# Patient Record
Sex: FEMALE | Race: WHITE | Employment: OTHER | URBAN - METROPOLITAN AREA
[De-identification: names, ages, dates, MRNs, and addresses within clinical notes are randomized per-mention and may not be internally consistent; named-entity substitution may affect disease eponyms.]

---

## 2017-01-01 ENCOUNTER — EXTERNAL ORDER RESULTS (OUTPATIENT)
Dept: HEALTH INFORMATION MANAGEMENT | Facility: CLINIC | Age: 42
End: 2017-01-01

## 2017-01-01 LAB — PAP SMEAR - HIM PATIENT REPORTED: NEGATIVE

## 2017-01-06 ENCOUNTER — HOSPITAL ENCOUNTER (EMERGENCY)
Facility: CLINIC | Age: 42
Discharge: HOME | End: 2017-01-06
Attending: EMERGENCY MEDICINE | Admitting: EMERGENCY MEDICINE
Payer: COMMERCIAL

## 2017-01-06 VITALS
DIASTOLIC BLOOD PRESSURE: 87 MMHG | TEMPERATURE: 98 F | OXYGEN SATURATION: 100 % | RESPIRATION RATE: 16 BRPM | SYSTOLIC BLOOD PRESSURE: 135 MMHG

## 2017-01-06 DIAGNOSIS — M25.571 CHRONIC PAIN OF RIGHT ANKLE: ICD-10-CM

## 2017-01-06 DIAGNOSIS — G89.29 CHRONIC PAIN OF RIGHT ANKLE: ICD-10-CM

## 2017-01-06 PROCEDURE — 99284 EMERGENCY DEPT VISIT MOD MDM: CPT | Performed by: EMERGENCY MEDICINE

## 2017-01-06 PROCEDURE — 99282 EMERGENCY DEPT VISIT SF MDM: CPT

## 2017-01-06 RX ORDER — OXYCODONE AND ACETAMINOPHEN 5; 325 MG/1; MG/1
1 TABLET ORAL EVERY 6 HOURS PRN
Qty: 7 TABLET | Refills: 0 | Status: SHIPPED | OUTPATIENT
Start: 2017-01-06 | End: 2017-02-02

## 2017-01-06 ASSESSMENT — ENCOUNTER SYMPTOMS
CONSTITUTIONAL NEGATIVE: 1
PSYCHIATRIC NEGATIVE: 1
CARDIOVASCULAR NEGATIVE: 1
GASTROINTESTINAL NEGATIVE: 1
NEUROLOGICAL NEGATIVE: 1
EYES NEGATIVE: 1
RESPIRATORY NEGATIVE: 1
ENDOCRINE NEGATIVE: 1
HEMATOLOGIC/LYMPHATIC NEGATIVE: 1
ALLERGIC/IMMUNOLOGIC NEGATIVE: 1

## 2017-01-06 NOTE — ED AVS SNAPSHOT
[facility] Emergency Department    [address] [address]    Phone:  [phone]    Fax:  [phone]                                       Maris Wagner   MRN: [id number]    Department:  [facility] Emergency Department   Date of Visit:  1/6/2017           Patient Information     Date Of Birth          1975        Your diagnoses for this visit were:     Chronic pain of right ankle        You were seen by Gabriele Hunter MD.      Follow-up Information     Call Clint Simon DPM.    Specialty:  Podiatry    Why:  To discuss options for care including casting, or imaging options, MRI vs other options for care    Contact information:    [facility]  [address] [place] [id number] [phone]        Discharge References/Attachments     CHRONIC PAIN (ENGLISH)    OXYCODONE HYDROCHLORIDE, ACETAMINOPHEN ORAL TABLET (ENGLISH)      24 Hour Appointment Hotline       To make an appointment at any [facility] clinic, call [phone] ([phone]). If you don't have a family doctor or clinic, we will help you find one. [facility] clinics are conveniently located to serve the needs of you and your family.             Review of your medicines      START taking        Dose / Directions Last dose taken    oxyCODONE-acetaminophen 5-325 MG per tablet   Commonly known as:  PERCOCET   Dose:  1 tablet   Quantity:  7 tablet        Take 1 tablet by mouth every 6 hours as needed for pain   Refills:  0          Our records show that you are taking the medicines listed below. If these are incorrect, please call your family doctor or clinic.        Dose / Directions Last dose taken    IBUPROFEN PO   Dose:  600 mg        Take 600 mg by mouth every 6 hours as needed for moderate pain   Refills:  0        OMEPRAZOLE PO   Dose:  40 mg        Take 40 mg by mouth every morning   Refills:  0                Prescriptions were sent or printed at these locations (1 Prescription)                    [facility] [place], [place] - [address]   [address] [address]    Telephone:  [phone]   Fax:  [phone]   Hours:                  Printed at Department/Unit printer (1 of 1)         oxyCODONE-acetaminophen (PERCOCET) 5-325 MG per tablet                Orders Needing Specimen Collection     None      Pending Results     No orders found from 1/5/2017 to 1/7/2017.            Pending Culture Results     No orders found from 1/5/2017 to 1/7/2017.             Test Results from your hospital stay            Thank you for choosing [facility]       Thank you for choosing [facility] for your care. Our goal is always to provide you with excellent care. Hearing back from our patients is one way we can continue to improve our services. Please take a few minutes to complete the written survey that you may receive in the mail after you visit with us. Thank you!        [employer] Information     [employer] gives you secure access to your electronic health record. If you see a primary care provider, you can also send messages to your care team and make appointments. If you have questions, please call your primary care clinic.  If you do not have a primary care provider, please call [phone] and they will assist you.        Care EveryWhere ID     This is your Care EveryWhere ID. This could be used by other organizations to access your [facility] medical records  [id number]        After Visit Summary       This is your record. Keep this with you and show to your community pharmacist(s) and doctor(s) at your next visit.

## 2017-01-06 NOTE — ED NOTES
"Broke right ankle on 12/10 and has been \"rolling\" it while in boot and feels like it's \"out of place\" again. Also has pain in left ankle  "

## 2017-01-06 NOTE — ED AVS SNAPSHOT
[facility] Emergency Department    [address] [address]    Phone:  [phone]    Fax:  [phone]                                       Maris Wagner   MRN: [id number]    Department:  [facility] Emergency Department   Date of Visit:  1/6/2017           After Visit Summary Signature Page     I have received my discharge instructions, and my questions have been answered. I have discussed any challenges I see with this plan with the nurse or doctor.    ..........................................................................................................................................  Patient/Patient Representative Signature      ..........................................................................................................................................  Patient Representative Print Name and Relationship to Patient    ..................................................               ................................................  Date                                            Time    ..........................................................................................................................................  Reviewed by Signature/Title    ...................................................              ..............................................  Date                                                            Time

## 2017-01-06 NOTE — ED NOTES
"Patient said, \"I have left ankle pain that started today, I don't know what I did to that leg.  I also have my right ankle I broke 12/10/16 and it feels like a rolled it all over again.  It pops almost like its not holding in place.  I felt like it was popping last time in a air cast and then they gave me this boot\"  "

## 2017-01-07 NOTE — ED PROVIDER NOTES
History     Chief Complaint   Patient presents with     Ankle Pain     HPI  Ms. Maris Wagner is a 41 year old female with history of spinal stenosis in lumbar region w/o neurogenic claudication who presents to the ED today for evaluation of ankle pain. The patient currently has a broken right ankle diagnosed via XR imaging after rolling ankle in [facility]) while on vacation 12/10/16. Her ankle was initally placed in boot. Approximately one week later, the patient reports being seen at [facility] by Dr. Izaguirre. She notes boot was removed and ankle was casted due to painful, shifting movement in the joint. Around 12/25, the patient reports cast got wet, was removed, and ankle was re-placed in boot for functional purposes. She presents to the ED today after a return in painful, shifting movements in the joint and failure to reach Dr. Izaguirre at [facility].  She asks if her ankle could be re-casted and for acute pain management. Also, she mentions unusual swelling in her left ankle this morning. However, she attributes this to long standing chronic back pain and neuropathy. Of note, she blames chronic back pain for her broken ankle stating it caused gait change which led to repeatedly rolling her ankle. She currently recieves Oxycodone from Dr. Madrid her primary care physician but states she has run out. She reports she is scheduled for spinal fusion in the next few weeks.     I have reviewed the Medications, Allergies, Past Medical and Surgical History, and Social History in the Epic system.    Social History: Presents via private car driven by son from [place], [place].    Past Medical History:  Spinal stenosis of lumbar region, anemia, gastroesophageal reflux disease, irritable bowel disease, seasonal allergies, obesity, postoperative ileus.    Medications:  Current Outpatient Prescriptions   Medication Sig Dispense Refill     oxyCODONE-acetaminophen (PERCOCET) 5-325 MG per tablet Take 1 tablet by  mouth every 6 hours as needed for pain 7 tablet 0     IBUPROFEN PO Take 600 mg by mouth every 6 hours as needed for moderate pain       OMEPRAZOLE PO Take 40 mg by mouth every morning         Allergies:  No Known Allergies    Review of Systems   Constitutional: Negative.    HENT: Negative.    Eyes: Negative.    Respiratory: Negative.    Cardiovascular: Negative.    Gastrointestinal: Negative.    Endocrine: Negative.    Genitourinary: Negative.    Musculoskeletal:        Ankle pain and discomfort   Skin: Negative.    Allergic/Immunologic: Negative.    Neurological: Negative.    Hematological: Negative.    Psychiatric/Behavioral: Negative.      Physical Exam   BP: 135/87 mmHg  Heart Rate: 82  Temp: 98  F (36.7  C)  Resp: 16  SpO2: 100 %  Physical Exam   Constitutional: She is oriented to person, place, and time. She appears well-developed and well-nourished. No distress.   HENT:   Head: Normocephalic and atraumatic.   Eyes: Conjunctivae and EOM are normal. Pupils are equal, round, and reactive to light.   Neck: Normal range of motion. Neck supple. No JVD present. No tracheal deviation present. No thyromegaly present.   Cardiovascular: Normal rate and regular rhythm.  Exam reveals no gallop and no friction rub.    No murmur heard.  Pulmonary/Chest: Effort normal and breath sounds normal. No stridor. No respiratory distress. She has no wheezes. She has no rales. She exhibits no tenderness.   Abdominal: Soft. Bowel sounds are normal. She exhibits no distension and no mass. There is no tenderness. There is no rebound and no guarding.   Musculoskeletal:        Right hip: She exhibits swelling.        Legs:  Lymphadenopathy:     She has no cervical adenopathy.   Neurological: She is alert and oriented to person, place, and time.   Skin: No rash noted. She is not diaphoretic. No erythema. No pallor.   Psychiatric: She has a normal mood and affect. Her behavior is normal. Judgment and thought content normal.       ED Course    Procedures             Critical Care time:  none               Labs Ordered and Resulted from Time of ED Arrival Up to the Time of Departure from the ED - No data to display  ED Medications: none      ED Vitals:  Filed Vitals:    01/06/17 1657   BP: 135/87   Temp: 98  F (36.7  C)   TempSrc: Oral   Resp: 16   SpO2: 100%       ED Labs and Imaging: none        Assessments & Plan (with Medical Decision Making)   Clinical Impression: 41-year-old female who reports she has GERD and takes omeprazole.  She also reports she takes oxycodone for chronic low back pain prescribed by her primary care provider Dr. Madrid at [facility] who presented for concern for intermittent instability above her right ankle.  She reports she rolled her ankle on the 4th occasion on December 10.  She has been followed by Dr. Clint Liz.  She reports she initially had casting placed a week after she had injured her ankle when she was using [place].  The cast got wet.  She was placed in a cam boot.  Today she felt some instability in her right ankle but did not report rolling her ankle injuring her ankle.  She was concerned that she may have new injury and because of some discomfort and atraumatic swelling over the dorsum of her left foot she arrives in the emergency department for further care.  She does not report any tingling or numbness in her foot or leg.  She does have neuropathy by report because of chronic back pain.  On my exam she is in no acute distress she has some nonpitting edema over the dorsum of the left foot but no calf pain or swelling.  She has no gross instability about her right ankle.  She has normal sensation about the right foot and right leg.    ED Course and Plan:   We had a discussion about options for care.  I offered an MRI of her right ankle to exclude ligamentous injury given her concern for instability despite no gross deformity or abnormality on my exam.  Patient did not want to  wait for an MRI.  I offered an x-ray and CT of her ankle to exclude acute bony abnormality which patient does not feel she has a new injury or fracture.  She'll prefer to wait to see Dr. Clint Liz for additional direction.  I offered her a sugar tong splint to help with stability as I'm unable to perform casting in the emergency department.  Patient has a cam boot.  Patient tells me she is been trying to get hold of Dr. Clint Liz with significant success she has celled over the course of the week.    I reviewed her prescription drug monitoring profile.  He states he shows she got 50 tablets of tramadol from Arsh Kim MD. it also appears she received 30 tablets of Percocet on December 14 from Dr. Madrid prior to that on August 24 she got 6 tablets of Dilaudid from Dr. Familia Davalos.  Because she has made efforts by report to contact Dr. Liz and tells me she is coming out of pain medication although she recently got 50 tablets of tramadol and only told me she takes omeprazole as willing to give her 7 tablets of percocet  to take for pain control over the weekend until she can follow up with her primary foot and ankle surgeon.  Patient is notified of her prescription record.  Additional refills for pain medication need to be obtained from her primary care provider primary surgeon.   Patient will prefer to remain in her cam boot  provided after her initial consultation visit with Dr. Clint Simon        Disclaimer: This note consists of symbols derived from keyboarding, dictation and/or voice recognition software. As a result, there may be errors in the script that have gone undetected. Please consider this when interpreting information found in this chart.      I have reviewed the nursing notes.    I have reviewed the findings, diagnosis, plan and need for follow up with the patient.    Discharge Medication List as of 1/6/2017  7:03 PM      START taking these medications    Details    oxyCODONE-acetaminophen (PERCOCET) 5-325 MG per tablet Take 1 tablet by mouth every 6 hours as needed for pain, Disp-7 tablet, R-0, Local Print             Final diagnoses:   Chronic pain of right ankle   This document serves as a record of the services and decisions personally performed and made by Gabriele Hunter, *. It was created on HIS/HER behalf by Henry Joyner, a trained medical scribe. The creation of this document is based the provider's statements to the medical scribe.  Henry Joyner 6:32 PM 1/6/2017    Provider:   The information in this document, created by the medical scribe for me, accurately reflects the services I personally performed and the decisions made by me. I have reviewed and approved this document for accuracy prior to leaving the patient care area.  Gabriele Hunter, Elisa 6:32 PM 1/6/2017 1/6/2017   [facility] EMERGENCY DEPARTMENT      Gabriele Hunter MD  01/07/17 0033

## 2017-02-02 ENCOUNTER — HOSPITAL ENCOUNTER (EMERGENCY)
Facility: CLINIC | Age: 42
Discharge: HOME | End: 2017-02-02
Attending: EMERGENCY MEDICINE | Admitting: EMERGENCY MEDICINE
Payer: MEDICAID

## 2017-02-02 VITALS
OXYGEN SATURATION: 98 % | DIASTOLIC BLOOD PRESSURE: 78 MMHG | RESPIRATION RATE: 16 BRPM | TEMPERATURE: 98.3 F | SYSTOLIC BLOOD PRESSURE: 122 MMHG | HEART RATE: 107 BPM

## 2017-02-02 DIAGNOSIS — G89.29 ACUTE EXACERBATION OF CHRONIC LOW BACK PAIN: ICD-10-CM

## 2017-02-02 DIAGNOSIS — M54.50 ACUTE EXACERBATION OF CHRONIC LOW BACK PAIN: ICD-10-CM

## 2017-02-02 PROCEDURE — 96372 THER/PROPH/DIAG INJ SC/IM: CPT

## 2017-02-02 PROCEDURE — 99285 EMERGENCY DEPT VISIT HI MDM: CPT | Mod: 25

## 2017-02-02 PROCEDURE — 99283 EMERGENCY DEPT VISIT LOW MDM: CPT | Performed by: EMERGENCY MEDICINE

## 2017-02-02 PROCEDURE — 25000128 H RX IP 250 OP 636: Performed by: EMERGENCY MEDICINE

## 2017-02-02 RX ORDER — OXYCODONE AND ACETAMINOPHEN 5; 325 MG/1; MG/1
1-2 TABLET ORAL EVERY 6 HOURS PRN
Qty: 15 TABLET | Refills: 0 | Status: SHIPPED | OUTPATIENT
Start: 2017-02-02 | End: 2017-03-03

## 2017-02-02 RX ADMIN — HYDROMORPHONE HYDROCHLORIDE 1 MG: 1 INJECTION, SOLUTION INTRAMUSCULAR; INTRAVENOUS; SUBCUTANEOUS at 19:59

## 2017-02-02 NOTE — ED AVS SNAPSHOT
[facility] Emergency Department    [address] [address]    Phone:  [phone]    Fax:  [phone]                                       Maris Wagner   MRN: [id number]    Department:  [facility] Emergency Department   Date of Visit:  2/2/2017           After Visit Summary Signature Page     I have received my discharge instructions, and my questions have been answered. I have discussed any challenges I see with this plan with the nurse or doctor.    ..........................................................................................................................................  Patient/Patient Representative Signature      ..........................................................................................................................................  Patient Representative Print Name and Relationship to Patient    ..................................................               ................................................  Date                                            Time    ..........................................................................................................................................  Reviewed by Signature/Title    ...................................................              ..............................................  Date                                                            Time

## 2017-02-02 NOTE — ED AVS SNAPSHOT
[facility] Emergency Department    [address] [address]    Phone:  [phone]    Fax:  [phone]                                       Maris Wagner   MRN: [id number]    Department:  [facility] Emergency Department   Date of Visit:  2/2/2017           Patient Information     Date Of Birth          1975        Your diagnoses for this visit were:     Acute exacerbation of chronic low back pain        You were seen by Cruz Bucio DO.        Discharge Instructions       Apply ice to low back to reduce acute pain  Oxycodone as directed for severe pain  While on narcotics the patient should avoid driving, operating machinery, climbing, and alcohol.   Follow-up with your spine specialist if pain gets worse.    24 Hour Appointment Hotline       To make an appointment at any [facility] clinic, call [phone] ([phone]). If you don't have a family doctor or clinic, we will help you find one. [facility] clinics are conveniently located to serve the needs of you and your family.             Review of your medicines      CONTINUE these medicines which may have CHANGED, or have new prescriptions. If we are uncertain of the size of tablets/capsules you have at home, strength may be listed as something that might have changed.        Dose / Directions Last dose taken    oxyCODONE-acetaminophen 5-325 MG per tablet   Commonly known as:  PERCOCET   Dose:  1-2 tablet   What changed:  how much to take   Quantity:  15 tablet        Take 1-2 tablets by mouth every 6 hours as needed for pain   Refills:  0          Our records show that you are taking the medicines listed below. If these are incorrect, please call your family doctor or clinic.        Dose / Directions Last dose taken    IBUPROFEN PO   Dose:  600 mg        Take 600 mg by mouth every 6 hours as needed for moderate pain   Refills:  0        OMEPRAZOLE PO   Dose:  40 mg        Take 40 mg by mouth every morning    Refills:  0                Prescriptions were sent or printed at these locations (1 Prescription)                   Other Prescriptions                Printed at Department/Unit printer (1 of 1)         oxyCODONE-acetaminophen (PERCOCET) 5-325 MG per tablet                Orders Needing Specimen Collection     None      Pending Results     No orders found from 2/1/2017 to 2/3/2017.            Pending Culture Results     No orders found from 2/1/2017 to 2/3/2017.             Test Results from your hospital stay            Thank you for choosing [facility]       Thank you for choosing [facility] for your care. Our goal is always to provide you with excellent care. Hearing back from our patients is one way we can continue to improve our services. Please take a few minutes to complete the written survey that you may receive in the mail after you visit with us. Thank you!        [employer] Information     [employer] gives you secure access to your electronic health record. If you see a primary care provider, you can also send messages to your care team and make appointments. If you have questions, please call your primary care clinic.  If you do not have a primary care provider, please call [phone] and they will assist you.        Care EveryWhere ID     This is your Care EveryWhere ID. This could be used by other organizations to access your [facility] medical records  [id number]        After Visit Summary       This is your record. Keep this with you and show to your community pharmacist(s) and doctor(s) at your next visit.

## 2017-02-03 NOTE — ED PROVIDER NOTES
History   No chief complaint on file.    HPI     Maris Wagner is a 41 year old female patient with history for chronic low back pain.  History for previous lumbar spinal fusion did not help resolve the pain apparently help provide some stabilization.  Patient reports scheduled for additional fusion.  Currently presents 10/10 pain.  No acute injury mechanism.  Surgeon is Dr. Kim at [facility].    I have reviewed the Medications, Allergies, Past Medical and Surgical History, and Social History in the Epic system.    Patient Active Problem List   Diagnosis     CARDIOVASCULAR SCREENING; LDL GOAL LESS THAN 130     Ileus, postoperative     Spinal stenosis of lumbar region     Anemia     Gastroesophageal reflux disease     Irritable bowel syndrome     Seasonal allergies     Obesity       Review of Systems    Constitutional: Negative.    HENT: Negative.    Eyes: Negative.    Respiratory: Negative.    Cardiovascular: Negative.    Gastrointestinal: Negative.    Endocrine: Negative.    Genitourinary: Negative.    Musculoskeletal: Negative.    Skin: Negative.    Allergic/Immunologic: Negative.    Neurological: Negative.    Hematological: Negative.    Psychiatric/Behavioral: Negative.    All other systems reviewed and are negative.    Physical Exam     BP: 139/86 mmHg  Pulse: 107  Temp: 98.3  F (36.8  C)  Resp: 20  SpO2: 99 %    Physical Exam    Appears uncomfortable.  Difficulty moving from a standing to supine and supine to standing.  She is morbidly obese which contributed to her low back problems.  Very poor abdominal muscle tone.  She has very poor posture.  Low back noted no acute muscle spasm.  She had no midline pain with percussion.  Increased low back pain with a sore testing bilaterally but no radicular symptoms  Lower extremity muscle tone and strength was intact bilateral      ED Course   Procedures                 Labs Ordered and Resulted from Time of ED Arrival Up to the Time of Departure from the ED -  No data to display    Assessments & Plan (with Medical Decision Making)  41-year-old morbidly obese female with chronic lumbar back pain status post lumbar spinal fusion presents with acute exacerbation of her chronic pain.  Patient reports she is scheduled for repeat surgery with Dr. Kim at [facility] this spring.  Acute neurologic compromise . Treated with Dilaudid 1 mg in the ED.     I have reviewed the nursing notes.    I have reviewed the findings, diagnosis, plan and need for follow up with the patient.    New Prescriptions    OXYCODONE-ACETAMINOPHEN (PERCOCET) 5-325 MG PER TABLET    Take 1-2 tablets by mouth every 6 hours as needed for pain       Final diagnoses:   Acute exacerbation of chronic low back pain       2/2/2017   [facility] EMERGENCY DEPARTMENT      Cruz Bucio,   02/02/17 2012    Cruz uBcio, DO  02/20/17 0050

## 2017-02-03 NOTE — DISCHARGE INSTRUCTIONS
Apply ice to low back to reduce acute pain  Oxycodone as directed for severe pain  While on narcotics the patient should avoid driving, operating machinery, climbing, and alcohol.   Follow-up with your spine specialist if pain gets worse.

## 2017-02-03 NOTE — ED NOTES
Pt has chronic back pain issues, had back surgery last year and needs another. Pt always has pain shooting down both legs and difficulty walking, pt says she felt last year and broke her ankle due to difficulty walking.

## 2017-03-03 ENCOUNTER — HOSPITAL ENCOUNTER (EMERGENCY)
Facility: CLINIC | Age: 42
Discharge: HOME | End: 2017-03-03
Attending: EMERGENCY MEDICINE | Admitting: EMERGENCY MEDICINE
Payer: MEDICAID

## 2017-03-03 VITALS
WEIGHT: 237 LBS | OXYGEN SATURATION: 96 % | SYSTOLIC BLOOD PRESSURE: 92 MMHG | BODY MASS INDEX: 38.09 KG/M2 | RESPIRATION RATE: 18 BRPM | DIASTOLIC BLOOD PRESSURE: 70 MMHG | TEMPERATURE: 97.8 F | HEIGHT: 66 IN

## 2017-03-03 DIAGNOSIS — M51.369 DDD (DEGENERATIVE DISC DISEASE), LUMBAR: ICD-10-CM

## 2017-03-03 DIAGNOSIS — Z98.1 S/P LUMBAR SPINAL FUSION: ICD-10-CM

## 2017-03-03 DIAGNOSIS — Z98.890 HISTORY OF LUMBAR LAMINECTOMY FOR SPINAL CORD DECOMPRESSION: ICD-10-CM

## 2017-03-03 DIAGNOSIS — M54.16 LUMBAR RADICULOPATHY, CHRONIC: ICD-10-CM

## 2017-03-03 PROCEDURE — 96372 THER/PROPH/DIAG INJ SC/IM: CPT

## 2017-03-03 PROCEDURE — 99285 EMERGENCY DEPT VISIT HI MDM: CPT | Mod: 25

## 2017-03-03 PROCEDURE — 25000128 H RX IP 250 OP 636: Performed by: EMERGENCY MEDICINE

## 2017-03-03 PROCEDURE — 99284 EMERGENCY DEPT VISIT MOD MDM: CPT | Performed by: EMERGENCY MEDICINE

## 2017-03-03 RX ORDER — OXYCODONE AND ACETAMINOPHEN 5; 325 MG/1; MG/1
1-2 TABLET ORAL EVERY 6 HOURS PRN
Qty: 15 TABLET | Refills: 0 | Status: ON HOLD | OUTPATIENT
Start: 2017-03-03 | End: 2017-03-28

## 2017-03-03 RX ADMIN — HYDROMORPHONE HYDROCHLORIDE 1 MG: 1 INJECTION, SOLUTION INTRAMUSCULAR; INTRAVENOUS; SUBCUTANEOUS at 02:37

## 2017-03-03 NOTE — ED AVS SNAPSHOT
[facility] Emergency Department    [address] [address]    Phone:  [phone]    Fax:  [phone]                                       Maris Wagner   MRN: [id number]    Department:  [facility] Emergency Department   Date of Visit:  3/3/2017           After Visit Summary Signature Page     I have received my discharge instructions, and my questions have been answered. I have discussed any challenges I see with this plan with the nurse or doctor.    ..........................................................................................................................................  Patient/Patient Representative Signature      ..........................................................................................................................................  Patient Representative Print Name and Relationship to Patient    ..................................................               ................................................  Date                                            Time    ..........................................................................................................................................  Reviewed by Signature/Title    ...................................................              ..............................................  Date                                                            Time

## 2017-03-03 NOTE — DISCHARGE INSTRUCTIONS
Percocet as directed for severe pain  While on narcotics the patient should avoid driving, operating machinery, climbing, and alcohol.

## 2017-03-03 NOTE — ED AVS SNAPSHOT
[facility] Emergency Department    [address] [address]    Phone:  [phone]    Fax:  [phone]                                       Maris Wagner   MRN: [id number]    Department:  [facility] Emergency Department   Date of Visit:  3/3/2017           Patient Information     Date Of Birth          1975        Your diagnoses for this visit were:     DDD (degenerative disc disease), lumbar     Lumbar radiculopathy, chronic     History of lumbar laminectomy for spinal cord decompression     S/P lumbar spinal fusion        You were seen by Cruz Bucio DO.        Discharge Instructions       Percocet as directed for severe pain  While on narcotics the patient should avoid driving, operating machinery, climbing, and alcohol.       24 Hour Appointment Hotline       To make an appointment at any [facility] clinic, call [phone] ([phone]). If you don't have a family doctor or clinic, we will help you find one. [facility] clinics are conveniently located to serve the needs of you and your family.             Review of your medicines      Our records show that you are taking the medicines listed below. If these are incorrect, please call your family doctor or clinic.        Dose / Directions Last dose taken    IBUPROFEN PO   Dose:  600 mg        Take 600 mg by mouth every 6 hours as needed for moderate pain   Refills:  0        OMEPRAZOLE PO   Dose:  40 mg        Take 40 mg by mouth every morning   Refills:  0        oxyCODONE-acetaminophen 5-325 MG per tablet   Commonly known as:  PERCOCET   Dose:  1-2 tablet   Quantity:  15 tablet        Take 1-2 tablets by mouth every 6 hours as needed for pain   Refills:  0                Prescriptions were sent or printed at these locations (1 Prescription)                   Other Prescriptions                Printed at Department/Unit printer (1 of 1)         oxyCODONE-acetaminophen (PERCOCET) 5-325 MG per tablet                 Orders Needing Specimen Collection     None      Pending Results     No orders found from 3/1/2017 to 3/4/2017.            Pending Culture Results     No orders found from 3/1/2017 to 3/4/2017.             Test Results from your hospital stay            Thank you for choosing [facility]       Thank you for choosing [facility] for your care. Our goal is always to provide you with excellent care. Hearing back from our patients is one way we can continue to improve our services. Please take a few minutes to complete the written survey that you may receive in the mail after you visit with us. Thank you!        [email or web address] Information     [employer] gives you secure access to your electronic health record. If you see a primary care provider, you can also send messages to your care team and make appointments. If you have questions, please call your primary care clinic.  If you do not have a primary care provider, please call [phone] and they will assist you.        Care EveryWhere ID     This is your Care EveryWhere ID. This could be used by other organizations to access your [facility] medical records  [id number]        After Visit Summary       This is your record. Keep this with you and show to your community pharmacist(s) and doctor(s) at your next visit.

## 2017-03-03 NOTE — ED NOTES
Has chronic back pain with prior surgical intervention [facility]   Yesterday stepped wrong felt twinge of pain tonight was getting up from bed to go to bathroom had sudden increase in pain unable to get comfortable  reports she has no meds at home

## 2017-03-03 NOTE — ED PROVIDER NOTES
History     Chief Complaint   Patient presents with     Back Pain     HPI  Maris Wagner is a 41 year old female who presents with acute on chronic low back pain.  Patient has known extensive degenerative disc disease lumbar spine and degenerative arthritis.  Has had previous fusion at L5-S1, per his decompression surgery L3-L4.  Most recent MR imaging shows extruded fragment at L3-L4 causing right nerve root impingement L4.  Now is here at patellar reflex.  Pain currently 10/10.  Scheduled for fusion L3-L4 with Dr. Kim at [facility] and the 2nd week of April.  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  Patient's last narcotic refill for Percocet 15 tablets was on February 2 following an ER visit at [facility].  No established pattern for narcotic abuse-her review of her [place] prescription monitoring.    Presents requesting something for acute pain.  No acute injury.  States she had severe pain when moving to get off her bed this evening.  States she still has the chronic S1 sensory loss in her left leg with a little bit of foot drop.  This unchanged.  She is having more radicular pain in the right buttock and the right lateral anterior thigh.  I have reviewed the Medications, Allergies, Past Medical and Surgical History, and Social History in the Epic system.    Patient Active Problem List   Diagnosis     CARDIOVASCULAR SCREENING; LDL GOAL LESS THAN 130     Ileus, postoperative     Spinal stenosis of lumbar region     Anemia     Gastroesophageal reflux disease     Irritable bowel syndrome     Seasonal allergies     Obesity     Current Facility-Administered Medications   Medication     HYDROmorphone (DILAUDID) injection 1 mg     Current Outpatient Prescriptions   Medication     oxyCODONE-acetaminophen (PERCOCET) 5-325 MG per tablet     IBUPROFEN PO     OMEPRAZOLE PO     No Known Allergies    Review of Systems    Constitutional: Negative.    HENT: Negative.    Eyes: Negative.  "   Respiratory: Negative.    Cardiovascular: Negative.    Gastrointestinal: Negative.    Endocrine: Negative.    Genitourinary: Negative.    Musculoskeletal: Negative.    Skin: Negative.    Allergic/Immunologic: Negative.    Neurological: Negative.    Hematological: Negative.    Psychiatric/Behavioral: Negative.    All other systems reviewed and are negative.    Physical Exam   BP: 143/88  Heart Rate: 87  Temp: 97.8  F (36.6  C)  Resp: 18  Height: 167.6 cm (5' 6\")  Weight: 107.5 kg (237 lb)  SpO2: 98 %  Physical Exam  Vital signs reviewed  Overweight female  Right SLR testing positive-in a sitting position she falls back in a tripod position to take pressure off  Loss of right patellar reflex-new  No muscle atrophy or loss of strength in her right leg  Ace wrap on the right ankle.  Recent fracture December with some residual soft tissue swelling  No signs for DVT    ED Course     ED Course     Procedures                 Labs Ordered and Resulted from Time of ED Arrival Up to the Time of Departure from the ED - No data to display    Assessments & Plan (with Medical Decision Making)  Acute and chronic lumbar back pain.  History for 2 previous lumbar surgeries.  Scheduled for a 3rd lumbar surgery/fusion at the L3 to 4 level IInd week of April with Dr. Kim at [facility] .  Treatment in ED with Dilaudid 1 mg IM . discharged home Percocet 5/325 #15 tablets .  Patient per [place] prescription monitoring is not overfilling narcotics .     I have reviewed the nursing notes.    I have reviewed the findings, diagnosis, plan and need for follow up with the patient.    New Prescriptions    No medications on file       Final diagnoses:   DDD (degenerative disc disease), lumbar   Lumbar radiculopathy, chronic   History of lumbar laminectomy for spinal cord decompression   S/P lumbar spinal fusion       3/3/2017   [facility] EMERGENCY DEPARTMENT     Cruz Bucio,   03/03/17 0233    "

## 2017-03-16 ENCOUNTER — TRANSFERRED RECORDS (OUTPATIENT)
Dept: HEALTH INFORMATION MANAGEMENT | Facility: CLINIC | Age: 42
End: 2017-03-16

## 2017-03-27 ENCOUNTER — ANESTHESIA EVENT (OUTPATIENT)
Dept: SURGERY | Facility: CLINIC | Age: 42
End: 2017-03-27
Payer: MEDICAID

## 2017-03-28 ENCOUNTER — HOSPITAL ENCOUNTER (OUTPATIENT)
Facility: CLINIC | Age: 42
Discharge: HOME | End: 2017-03-28
Attending: ORTHOPAEDIC SURGERY | Admitting: ORTHOPAEDIC SURGERY
Payer: MEDICAID

## 2017-03-28 ENCOUNTER — APPOINTMENT (OUTPATIENT)
Dept: GENERAL RADIOLOGY | Facility: CLINIC | Age: 42
End: 2017-03-28
Attending: ORTHOPAEDIC SURGERY
Payer: MEDICAID

## 2017-03-28 ENCOUNTER — ANESTHESIA (OUTPATIENT)
Dept: SURGERY | Facility: CLINIC | Age: 42
End: 2017-03-28
Payer: MEDICAID

## 2017-03-28 VITALS
OXYGEN SATURATION: 98 % | RESPIRATION RATE: 16 BRPM | DIASTOLIC BLOOD PRESSURE: 63 MMHG | TEMPERATURE: 97 F | SYSTOLIC BLOOD PRESSURE: 104 MMHG

## 2017-03-28 PROCEDURE — 36000056 ZZH SURGERY LEVEL 3 1ST 30 MIN: Performed by: ORTHOPAEDIC SURGERY

## 2017-03-28 PROCEDURE — 25000125 ZZHC RX 250: Performed by: NURSE ANESTHETIST, CERTIFIED REGISTERED

## 2017-03-28 PROCEDURE — 36000058 ZZH SURGERY LEVEL 3 EA 15 ADDTL MIN: Performed by: ORTHOPAEDIC SURGERY

## 2017-03-28 PROCEDURE — 37000009 ZZH ANESTHESIA TECHNICAL FEE, EACH ADDTL 15 MIN: Performed by: ORTHOPAEDIC SURGERY

## 2017-03-28 PROCEDURE — 37000008 ZZH ANESTHESIA TECHNICAL FEE, 1ST 30 MIN: Performed by: ORTHOPAEDIC SURGERY

## 2017-03-28 PROCEDURE — S0020 INJECTION, BUPIVICAINE HYDRO: HCPCS | Performed by: ORTHOPAEDIC SURGERY

## 2017-03-28 PROCEDURE — 25000125 ZZHC RX 250: Performed by: ORTHOPAEDIC SURGERY

## 2017-03-28 PROCEDURE — 40000305 ZZH STATISTIC PRE PROC ASSESS I: Performed by: ORTHOPAEDIC SURGERY

## 2017-03-28 PROCEDURE — 71000027 ZZH RECOVERY PHASE 2 EACH 15 MINS: Performed by: ORTHOPAEDIC SURGERY

## 2017-03-28 PROCEDURE — 25000128 H RX IP 250 OP 636: Performed by: NURSE ANESTHETIST, CERTIFIED REGISTERED

## 2017-03-28 PROCEDURE — 40000277 XR SURGERY CARM FLUORO LESS THAN 5 MIN W STILLS

## 2017-03-28 PROCEDURE — 27210794 ZZH OR GENERAL SUPPLY STERILE: Performed by: ORTHOPAEDIC SURGERY

## 2017-03-28 RX ORDER — DEXAMETHASONE SODIUM PHOSPHATE 4 MG/ML
INJECTION, SOLUTION INTRA-ARTICULAR; INTRALESIONAL; INTRAMUSCULAR; INTRAVENOUS; SOFT TISSUE PRN
Status: DISCONTINUED | OUTPATIENT
Start: 2017-03-28 | End: 2017-03-28

## 2017-03-28 RX ORDER — LIDOCAINE HYDROCHLORIDE 10 MG/ML
INJECTION, SOLUTION INFILTRATION; PERINEURAL PRN
Status: DISCONTINUED | OUTPATIENT
Start: 2017-03-28 | End: 2017-03-28 | Stop reason: HOSPADM

## 2017-03-28 RX ORDER — ONDANSETRON 2 MG/ML
INJECTION INTRAMUSCULAR; INTRAVENOUS PRN
Status: DISCONTINUED | OUTPATIENT
Start: 2017-03-28 | End: 2017-03-28

## 2017-03-28 RX ORDER — KETOROLAC TROMETHAMINE 30 MG/ML
INJECTION, SOLUTION INTRAMUSCULAR; INTRAVENOUS PRN
Status: DISCONTINUED | OUTPATIENT
Start: 2017-03-28 | End: 2017-03-28

## 2017-03-28 RX ORDER — CEFAZOLIN SODIUM 1 G/3ML
1 INJECTION, POWDER, FOR SOLUTION INTRAMUSCULAR; INTRAVENOUS SEE ADMIN INSTRUCTIONS
Status: DISCONTINUED | OUTPATIENT
Start: 2017-03-28 | End: 2017-03-28 | Stop reason: HOSPADM

## 2017-03-28 RX ORDER — CEFAZOLIN SODIUM 2 G/100ML
2 INJECTION, SOLUTION INTRAVENOUS
Status: DISCONTINUED | OUTPATIENT
Start: 2017-03-28 | End: 2017-03-28 | Stop reason: HOSPADM

## 2017-03-28 RX ORDER — PROPOFOL 10 MG/ML
INJECTION, EMULSION INTRAVENOUS PRN
Status: DISCONTINUED | OUTPATIENT
Start: 2017-03-28 | End: 2017-03-28

## 2017-03-28 RX ORDER — LIDOCAINE 40 MG/G
CREAM TOPICAL
Status: DISCONTINUED | OUTPATIENT
Start: 2017-03-28 | End: 2017-03-28 | Stop reason: HOSPADM

## 2017-03-28 RX ORDER — SODIUM CHLORIDE, SODIUM LACTATE, POTASSIUM CHLORIDE, CALCIUM CHLORIDE 600; 310; 30; 20 MG/100ML; MG/100ML; MG/100ML; MG/100ML
INJECTION, SOLUTION INTRAVENOUS CONTINUOUS
Status: DISCONTINUED | OUTPATIENT
Start: 2017-03-28 | End: 2017-03-28 | Stop reason: HOSPADM

## 2017-03-28 RX ORDER — BUPIVACAINE HYDROCHLORIDE 5 MG/ML
INJECTION, SOLUTION PERINEURAL PRN
Status: DISCONTINUED | OUTPATIENT
Start: 2017-03-28 | End: 2017-03-28 | Stop reason: HOSPADM

## 2017-03-28 RX ORDER — FENTANYL CITRATE 50 UG/ML
INJECTION, SOLUTION INTRAMUSCULAR; INTRAVENOUS PRN
Status: DISCONTINUED | OUTPATIENT
Start: 2017-03-28 | End: 2017-03-28

## 2017-03-28 RX ADMIN — PROPOFOL 100 MG: 10 INJECTION, EMULSION INTRAVENOUS at 15:25

## 2017-03-28 RX ADMIN — FENTANYL CITRATE 100 MCG: 50 INJECTION, SOLUTION INTRAMUSCULAR; INTRAVENOUS at 15:15

## 2017-03-28 RX ADMIN — PROPOFOL 50 MG: 10 INJECTION, EMULSION INTRAVENOUS at 15:16

## 2017-03-28 RX ADMIN — FENTANYL CITRATE 50 MCG: 50 INJECTION, SOLUTION INTRAMUSCULAR; INTRAVENOUS at 15:20

## 2017-03-28 RX ADMIN — ONDANSETRON 4 MG: 2 INJECTION INTRAMUSCULAR; INTRAVENOUS at 15:10

## 2017-03-28 RX ADMIN — DEXAMETHASONE SODIUM PHOSPHATE 4 MG: 4 INJECTION, SOLUTION INTRAMUSCULAR; INTRAVENOUS at 15:10

## 2017-03-28 RX ADMIN — PROPOFOL 100 MG: 10 INJECTION, EMULSION INTRAVENOUS at 15:20

## 2017-03-28 RX ADMIN — KETOROLAC TROMETHAMINE 30 MG: 30 INJECTION, SOLUTION INTRAMUSCULAR; INTRAVENOUS at 15:07

## 2017-03-28 RX ADMIN — PROPOFOL 50 MG: 10 INJECTION, EMULSION INTRAVENOUS at 15:12

## 2017-03-28 RX ADMIN — PROPOFOL 100 MG: 10 INJECTION, EMULSION INTRAVENOUS at 15:37

## 2017-03-28 RX ADMIN — MIDAZOLAM HYDROCHLORIDE 2 MG: 1 INJECTION, SOLUTION INTRAMUSCULAR; INTRAVENOUS at 15:08

## 2017-03-28 RX ADMIN — FENTANYL CITRATE 100 MCG: 50 INJECTION, SOLUTION INTRAMUSCULAR; INTRAVENOUS at 15:07

## 2017-03-28 NOTE — DISCHARGE INSTRUCTIONS
Same Day Surgery Discharge Instructions  Special Precautions After Surgery - Adult    1. It is not unusual to feel lightheaded or faint, up to 24 hours after surgery or while taking pain medication.  If you have these symptoms; sit for a few minutes before standing and have someone assist you when getting up.  2. You should rest and relax for the next 24 hours and must have someone stay with you for at least 24 hours after your discharge.  3. DO NOT DRIVE any vehicle or operate mechanical equipment for 24 hours following the end of your surgery.  DO NOT DRIVE while taking narcotic pain medications that have been prescribed by your physician.  If you had a limb operated on, you must be able to use it fully to drive.  4. DO NOT drink alcoholic beverages for 24 hours following surgery or while taking prescription pain medication.  5. Drink clear liquids (apple juice, ginger ale, broth, 7-Up, etc.).  Progress to your regular diet as you feel able.  6. Any questions call your physician and do not make important decisions for 24 hours.      Start your pain medication when needed.    [facility]:  [phone]       Same Day Surgery [phone], Monday thru Friday 6am-9pm.

## 2017-03-28 NOTE — IP AVS SNAPSHOT
[facility] PreOP/Phase II    [address] [address]    Phone:  [phone]    Fax:  [phone]                                       After Visit Summary   3/28/2017    Maris Wagner    MRN: [id number]           After Visit Summary Signature Page     I have received my discharge instructions, and my questions have been answered. I have discussed any challenges I see with this plan with the nurse or doctor.    ..........................................................................................................................................  Patient/Patient Representative Signature      ..........................................................................................................................................  Patient Representative Print Name and Relationship to Patient    ..................................................               ................................................  Date                                            Time    ..........................................................................................................................................  Reviewed by Signature/Title    ...................................................              ..............................................  Date                                                            Time

## 2017-03-28 NOTE — IP AVS SNAPSHOT
MRN:[id number]                      After Visit Summary   3/28/2017    Maris Wagner    MRN: [id number]           Thank you!     Thank you for choosing [facility] for your care. Our goal is always to provide you with excellent care. Hearing back from our patients is one way we can continue to improve our services. Please take a few minutes to complete the written survey that you may receive in the mail after you visit with us. Thank you!        Patient Information     Date Of Birth          1975        About your hospital stay     You were admitted on:  March 28, 2017 You last received care in the:  [facility] PreOP/Phase II    You were discharged on:  March 28, 2017        Reason for your hospital stay       Retained foreign body in small finger                  Who to Call     For medical emergencies, please call 911.  For non-urgent questions about your medical care, please call your primary care provider or clinic, [phone]  For questions related to your surgery, please call your surgery clinic        Attending Provider     Provider Specialty    Tabby Chan MD Orthopedics       Primary Care Provider Office Phone # Fax #    Zay [place] [phone]       [address] [address]         When to contact your care team       Call your primary doctor if you have any of the following: chest pain, troubles breathing, increased shortness of breath.  Call [facility] ([phone]) if you have any of the following: temperature greater than 100.5F, pain not controlled with elevation or pain medications, drainage that saturates the bandage.                  After Care Instructions     Activity       Keep your arm elevated above your heart for the next 2-3 days.  This will limit swelling and help with pain control.  It is ok to apply an ice bag to the area, but make sure there is no leak or chance for condensation to  cause your dressing to get damp.  Wet dressings can lead to infection.  Keep your sterile surgical dressing clean and dry.  Please do no remove your surgical dressings until Saturday.  Sponge bathing is recommended until [place].  Otherwise, cover your arm with plastic bags secured around the upper arm if showering and hold your arm outside of the shower.  OK to move your fingers, wrist, and elbow.  On Saturday, you may remove the dressing.  The finger can be washed under running water (sink or shower).  NO TUB bathing or washing dishes.  Pat the finger dry and redress with clean gauze.  Finger should have a dressing on it until the sutures are removed.  No lifting, pushing, pulling more than 10 lbs with the surgical extremity.  No repetitive activities with the surgical hand/wrist.            Diet       Resume your pre-op diet                  Follow-up Appointments     Follow-up and recommended labs and tests        Follow up with Dr. Chan in approximately 10-14 days at [facility] ([phone]).  You may need to call to schedule this appointment if you do not already have a follow-up appointment scheduled.                  Further instructions from your care team                         Same Day Surgery Discharge Instructions  Special Precautions After Surgery - Adult    1. It is not unusual to feel lightheaded or faint, up to 24 hours after surgery or while taking pain medication.  If you have these symptoms; sit for a few minutes before standing and have someone assist you when getting up.  2. You should rest and relax for the next 24 hours and must have someone stay with you for at least 24 hours after your discharge.  3. DO NOT DRIVE any vehicle or operate mechanical equipment for 24 hours following the end of your surgery.  DO NOT DRIVE while taking narcotic pain medications that have been prescribed by your physician.  If you had a limb operated on, you must be able to use it fully to  drive.  4. DO NOT drink alcoholic beverages for 24 hours following surgery or while taking prescription pain medication.  5. Drink clear liquids (apple juice, ginger ale, broth, 7-Up, etc.).  Progress to your regular diet as you feel able.  6. Any questions call your physician and do not make important decisions for 24 hours.      Start your pain medication when needed.    [facility]:  [phone]       Same Day Surgery [phone], Monday thru Friday 6am-9pm.        Pending Results     Date and Time Order Name Status Description    3/28/2017 1500 XR Surgery [place] L/T 5 Min Fluoro w Stills In process             Admission Information     Date & Time Provider Department Dept. Phone    3/28/2017 Tabby Chan MD [facility] PreOP/Phase -[phone]      Your Vitals Were     Blood Pressure Temperature Respirations Pulse Oximetry          114/74 97  F (36.1  C) (Oral) 16 97%        MyChart Information     [employer] gives you secure access to your electronic health record. If you see a primary care provider, you can also send messages to your care team and make appointments. If you have questions, please call your primary care clinic.  If you do not have a primary care provider, please call [phone] and they will assist you.        Care EveryWhere ID     This is your Care EveryWhere ID. This could be used by other organizations to access your [facility] medical records  [id number]           Review of your medicines      CONTINUE these medicines which have NOT CHANGED        Dose / Directions    IBUPROFEN PO        Dose:  600 mg   Take 600 mg by mouth every 6 hours as needed for moderate pain   Refills:  0       OMEPRAZOLE PO        Dose:  40 mg   Take 40 mg by mouth every morning   Refills:  0         STOP taking     oxyCODONE-acetaminophen 5-325 MG per tablet   Commonly known as:  PERCOCET                    Protect others around you: Learn how to safely use, store and throw away your  medicines at www.disposemymeds.org.             Medication List: This is a list of all your medications and when to take them. Check marks below indicate your daily home schedule. Keep this list as a reference.      Medications           Morning Afternoon Evening Bedtime As Needed    IBUPROFEN PO   Take 600 mg by mouth every 6 hours as needed for moderate pain                                OMEPRAZOLE PO   Take 40 mg by mouth every morning

## 2017-03-28 NOTE — ANESTHESIA POSTPROCEDURE EVALUATION
Patient: Maris Wagner    Procedure(s):  Left small finger foreign body removal - Wound Class: II-Clean Contaminated    Diagnosis:Fish fin in finger  Diagnosis Additional Information: No value filed.    Anesthesia Type:  No value filed.    Note:  Anesthesia Post Evaluation    Patient location during evaluation: Bedside  Patient participation: Able to fully participate in evaluation  Level of consciousness: awake and alert  Pain management: adequate  Airway patency: patent  Cardiovascular status: acceptable  Respiratory status: acceptable  Hydration status: acceptable  PONV: none     Anesthetic complications: None          Last vitals:  Vitals:    03/28/17 1400   BP: 118/73   Resp: 16   Temp: 36.1  C (97  F)   SpO2: 98%         Electronically Signed By: CORINA Turk CRNA  March 28, 2017  3:48 PM

## 2017-03-28 NOTE — ANESTHESIA PREPROCEDURE EVALUATION
Anesthesia Evaluation     . Pt has had prior anesthetic.     No history of anesthetic complications          ROS/MED HX    ENT/Pulmonary:  - neg pulmonary ROS     Neurologic:  - neg neurologic ROS     Cardiovascular:  - neg cardiovascular ROS       METS/Exercise Tolerance:  >4 METS   Hematologic:  - neg hematologic  ROS       Musculoskeletal:         GI/Hepatic:     (+) GERD Other GI/Hepatic       Renal/Genitourinary:  - ROS Renal section negative       Endo:     (+) Obesity, .      Psychiatric:  - neg psychiatric ROS       Infectious Disease:         Malignancy:         Other:                     Physical Exam  Normal systems: cardiovascular, pulmonary and dental    Airway   Mallampati: II  TM distance: >3 FB  Neck ROM: full    Dental     Cardiovascular   Rhythm and rate: regular and normal      Pulmonary    breath sounds clear to auscultation                    Anesthesia Plan      History & Physical Review  History and physical reviewed and following examination; no interval change.    ASA Status:  2 .    NPO Status:  > 6 hours    Plan for MAC   PONV prophylaxis:  Ondansetron (or other 5HT-3) and Dexamethasone or Solumedrol       Postoperative Care      Consents  Anesthetic plan, risks, benefits and alternatives discussed with:  Patient..                          .

## 2017-03-29 NOTE — OP NOTE
DATE OF PROCEDURE:   03/28/2017      PREOPERATIVE DIAGNOSIS:  Retained foreign body left small finger   POSTOPERATIVE DIAGNOSIS:  Same      PROCEDURE:  Removal of foreign body from left small finger.       SURGEON:  Tabby Chan MD   ASSISTANT:  Imelda Leigh PA-C      ANESTHESIA:  Monitored anesthesia care with local anesthetic for a left small finger digital block.      ESTIMATED BLOOD LOSS:  None.   SPECIMENS:  None.   DRAINS:  None.   COMPLICATIONS:  None known.      INDICATIONS:  Maris Wagner is a 41-year-old female who has had a retained foreign body in the volar aspect of her left small finger going on 1 year.  Finger is painful with gripping and grasping activities as well as when the volar surface is bumped in the region of the retained foreign body.  The retained foreign body has not responded to local wound care or soaks.  Treatment options were discussed at length including continued conservative management versus surgical intervention.  She understands the risks and benefits of each option.  She further understands the risks of surgery include but are not limited to following:  Problems with anesthesia, infection, sensitive scars, wound healing problems, bleeding, numbness, damage to surrounding anatomical structures including digital nerves, digital vessels or tendons, joint stiffness, sensitive scars, persistent pain, need for additional surgery despite our efforts today.  No guarantees were made or implied.  The patient expressed understanding and wished to proceed.      DESCRIPTION OF PROCEDURE:  The patient was met in the preoperative holding area and the correct extremity, the left long finger was identified and marked.  The patient was then brought to the operating room, placed supine on the operating room table.  IV sedation was administered.  A well-padded nonsterile tourniquet was placed on her left forearm.  The left upper extremity was then prepped and draped in the standard sterile  fashion.  Perioperative pause confirmed the correct patient, the correct procedure and the correct extremity.  A digital block was performed to the left small finger with a 1:1 combination of 1% lidocaine and 0.5% marcaine plain.  The left upper extremity was then elevated and exsanguinated with an Esmarch bandage and the tourniquet insufflated to 250 mmHg.      An oblique incision was planned over the volar aspect of the middle phalanx centered directly over the callus and retained foreign body.  Skin was incised sharply with scalpel.  Full-thickness skin flaps were raised.  The foreign body was easily visualized.  This was removed with a hemostat.  This was consistent with the appearance of a fish fin spine.  Intraoperative fluoroscopy was utilized.  This demonstrated a remaining small radiopaque foreign body within the subcutaneous tissues.  Hemostat was used to localize the area and gently debride the subcutaneous tissues.  A thin additional flake of fish spine material was removed.  Following this, fluoroscopy was utilized again.  No additional radiopaque foreign material identified in the wound on visual inspection or under fluoroscopy.  Wound was thoroughly irrigated.  Wound was reapproximated with an interrupted 4-0 nylon suture.  Sterile dressings were applied consisting of bacitracin, Adaptic, 2 x 2 followed by a lightly wrapped Antonina gauze and a Coban dressing.  Tourniquet was released after 17 minutes, with brisk return of capillary refill to the digit.  Sedation was reversed and the patient was taken to recovery in stable condition having tolerated the procedure well.  Sponge and instrument counts were correct at the conclusion of the procedure, which was performed under loupe magnification.      POSTOPERATIVE PLAN:  The patient is to keep her surgical dressings clean and dry until the weekend.  On Saturday she may remove the surgical dressings.  The wound may be washed in running water and a clean  dressing applied, no submerging of the wound under water.  A prescription for Norco 5/325 mg and Vistaril provided today.  Follow up in clinic in 10 days for wound check and suture removal.         ELISEO ZIMMERMAN MD             D: 2017 15:54   T: 2017 21:34   MT: PETER#[id number]      Name:     PEPE CLAUDIO   MRN:      [id number]        Account:        [id number]   :      1975           Procedure Date: 2017      Document: [id number]

## 2017-05-29 ENCOUNTER — HOSPITAL ENCOUNTER (EMERGENCY)
Facility: CLINIC | Age: 42
Discharge: HOME | End: 2017-05-29
Attending: FAMILY MEDICINE | Admitting: FAMILY MEDICINE
Payer: COMMERCIAL

## 2017-05-29 ENCOUNTER — APPOINTMENT (OUTPATIENT)
Dept: GENERAL RADIOLOGY | Facility: CLINIC | Age: 42
End: 2017-05-29
Attending: FAMILY MEDICINE
Payer: COMMERCIAL

## 2017-05-29 VITALS
DIASTOLIC BLOOD PRESSURE: 82 MMHG | OXYGEN SATURATION: 100 % | SYSTOLIC BLOOD PRESSURE: 122 MMHG | BODY MASS INDEX: 37.77 KG/M2 | TEMPERATURE: 97.9 F | WEIGHT: 234 LBS | RESPIRATION RATE: 18 BRPM

## 2017-05-29 DIAGNOSIS — M65.949 TENOSYNOVITIS OF HAND: ICD-10-CM

## 2017-05-29 PROCEDURE — 99283 EMERGENCY DEPT VISIT LOW MDM: CPT

## 2017-05-29 PROCEDURE — 99283 EMERGENCY DEPT VISIT LOW MDM: CPT | Performed by: FAMILY MEDICINE

## 2017-05-29 PROCEDURE — 73130 X-RAY EXAM OF HAND: CPT | Mod: LT

## 2017-05-29 RX ORDER — METHYLPREDNISOLONE 4 MG
TABLET, DOSE PACK ORAL
Qty: 21 TABLET | Refills: 0 | Status: SHIPPED | OUTPATIENT
Start: 2017-05-29 | End: 2017-07-04

## 2017-05-29 ASSESSMENT — ENCOUNTER SYMPTOMS
GASTROINTESTINAL NEGATIVE: 1
NUMBNESS: 0
BACK PAIN: 1
FEVER: 0
CARDIOVASCULAR NEGATIVE: 1
RESPIRATORY NEGATIVE: 1

## 2017-05-29 NOTE — ED PROVIDER NOTES
History     Chief Complaint   Patient presents with     Hand Pain     left hand pain and swelling - no known injury     HPI  Maris Wagner is a 41 year old female who has a painful L hand of 1 day duration.      She noticed tenderness and swelling at base of L thumb and in web space yesterday morning. Hard to  objects now. No injury.    She hasn't been doing heavy hand work. She is not working due to disability from spine - 2 fusions - degen disk disease.    No h/o gout.    She is on Duloxetine and Gabapentin.    Doesn't tolerate NSAID's.      Patient Active Problem List   Diagnosis     CARDIOVASCULAR SCREENING; LDL GOAL LESS THAN 130     Ileus, postoperative     Spinal stenosis of lumbar region     Anemia     Gastroesophageal reflux disease     Irritable bowel syndrome     Seasonal allergies     Obesity         I have reviewed the Medications, Allergies, Past Medical and Surgical History, and Social History in the Epic system.    Review of Systems   Constitutional: Negative for fever.   Respiratory: Negative.    Cardiovascular: Negative.    Gastrointestinal: Negative.    Genitourinary: Negative.    Musculoskeletal: Positive for back pain.   Skin: Negative for rash.   Neurological: Negative for numbness.       Physical Exam   BP: (!) 154/104  Heart Rate: 102  Temp: 97.9  F (36.6  C)  Resp: 18  Weight: 106.1 kg (234 lb)  SpO2: 100 %  Physical Exam   Constitutional: She appears well-nourished.   HENT:   Head: Normocephalic.   Neck: No thyromegaly present.   Cardiovascular: Regular rhythm.    Pulmonary/Chest: No respiratory distress.   Abdominal: She exhibits no distension.   Musculoskeletal:   L hand:  Mild swelling dorsoradial aspect of proximal hand.  Tender over thumb metacarpal and space between thumb and index.  No redness.   Neurological: Coordination normal.   Skin: No rash noted.   Psychiatric: She has a normal mood and affect.     Re check BP was 122 / 82.      ED Course     ED Course     Procedures              Critical Care time:  none               Labs Ordered and Resulted from Time of ED Arrival Up to the Time of Departure from the ED - No data to display    Assessments & Plan (with Medical Decision Making)     This patient developed inflammation in hand. No injury. It was localized near base of thumb. X ray shows no arthritis or other lesions. I advised Medrol instead of NSAID. Indications for F/U were discussed.              I have reviewed the nursing notes.    I have reviewed the findings, diagnosis, plan and need for follow up with the patient.    New Prescriptions    METHYLPREDNISOLONE (MEDROL DOSEPAK) 4 MG TABLET    Follow package instructions       Final diagnoses:   Tenosynovitis of hand       5/29/2017   [facility] EMERGENCY DEPARTMENT     Luigi Jean-Baptiste MD  05/29/17 0920

## 2017-05-29 NOTE — ED AVS SNAPSHOT
[facility] Emergency Department    [address] [address]    Phone:  [phone]    Fax:  [phone]                                       Maris Wagner   MRN: [id number]    Department:  [facility] Emergency Department   Date of Visit:  5/29/2017           After Visit Summary Signature Page     I have received my discharge instructions, and my questions have been answered. I have discussed any challenges I see with this plan with the nurse or doctor.    ..........................................................................................................................................  Patient/Patient Representative Signature      ..........................................................................................................................................  Patient Representative Print Name and Relationship to Patient    ..................................................               ................................................  Date                                            Time    ..........................................................................................................................................  Reviewed by Signature/Title    ...................................................              ..............................................  Date                                                            Time

## 2017-05-29 NOTE — DISCHARGE INSTRUCTIONS
Take prescribed medication as directed.    Try heat.    Ace wrap for support.    See your Doctor if these symptoms persist.

## 2017-05-29 NOTE — ED NOTES
Pt states she woke up with left hand pain. Pt's is swollen around left thumb and pointer finger. Pt is able to move her fingers, but thumb mobility is decreased.

## 2017-05-29 NOTE — ED AVS SNAPSHOT
[facility] Emergency Department    [address] [address]    Phone:  [phone]    Fax:  [phone]                                       Maris Wagner   MRN: [id number]    Department:  [facility] Emergency Department   Date of Visit:  5/29/2017           Patient Information     Date Of Birth          1975        Your diagnoses for this visit were:     Tenosynovitis of hand        You were seen by Luigi Jean-Baptiste MD.        Discharge Instructions       Take prescribed medication as directed.    Try heat.    Ace wrap for support.    See your Doctor if these symptoms persist.    24 Hour Appointment Hotline       To make an appointment at any [facility] clinic, call [phone] ([phone]). If you don't have a family doctor or clinic, we will help you find one. [facility] clinics are conveniently located to serve the needs of you and your family.             Review of your medicines      START taking        Dose / Directions Last dose taken    methylPREDNISolone 4 MG tablet   Commonly known as:  MEDROL DOSEPAK   Quantity:  21 tablet        Follow package instructions   Refills:  0          Our records show that you are taking the medicines listed below. If these are incorrect, please call your family doctor or clinic.        Dose / Directions Last dose taken    IBUPROFEN PO   Dose:  600 mg        Take 600 mg by mouth every 6 hours as needed for moderate pain   Refills:  0        OMEPRAZOLE PO   Dose:  40 mg        Take 40 mg by mouth every morning   Refills:  0                Prescriptions were sent or printed at these locations (1 Prescription)                   [facility] [address]   [address] [address]    Telephone:  [phone]   Fax:  [phone]   Hours:                  E-Prescribed (1 of 1)         methylPREDNISolone (MEDROL DOSEPAK) 4 MG tablet                Procedures and tests performed during your visit      Hand XR, G/E 3 views, left      Orders Needing Specimen Collection     None      Pending Results     No orders found from 5/27/2017 to 5/30/2017.            Pending Culture Results     No orders found from 5/27/2017 to 5/30/2017.            Pending Results Instructions     If you had any lab results that were not finalized at the time of your Discharge, you can call the ED Lab Result RN at [phone]. You will be contacted by this team for any positive Lab results or changes in treatment. The nurses are available 7 days a week from 10A to 6:30P.  You can leave a message 24 hours per day and they will return your call.        Test Results From Your Hospital Stay        5/29/2017  9:41 AM      Narrative     LEFT HAND THREE VIEWS  5/29/2017 9:27 AM    HISTORY: Pain in the bases of the thumb and index finger.    COMPARISON: None.    FINDINGS: No fracture or osseous lesion is seen. The joint spaces are  well preserved. No adjacent soft tissue pathology is seen.        Impression     IMPRESSION: Unremarkable examination.    LOCO NGUYỄN MD                Thank you for choosing [facility]       Thank you for choosing [facility] for your care. Our goal is always to provide you with excellent care. Hearing back from our patients is one way we can continue to improve our services. Please take a few minutes to complete the written survey that you may receive in the mail after you visit with us. Thank you!        [facility] Information     [facility] gives you secure access to your electronic health record. If you see a primary care provider, you can also send messages to your care team and make appointments. If you have questions, please call your primary care clinic.  If you do not have a primary care provider, please call [phone] and they will assist you.        Care EveryWhere ID     This is your Care EveryWhere ID. This could be used by other organizations to access your [facility] medical records  [id number]         After Visit Summary       This is your record. Keep this with you and show to your community pharmacist(s) and doctor(s) at your next visit.

## 2017-05-31 ENCOUNTER — HOSPITAL ENCOUNTER (EMERGENCY)
Facility: CLINIC | Age: 42
Discharge: HOME | End: 2017-05-31
Attending: EMERGENCY MEDICINE | Admitting: EMERGENCY MEDICINE
Payer: COMMERCIAL

## 2017-05-31 VITALS
RESPIRATION RATE: 18 BRPM | HEIGHT: 64 IN | SYSTOLIC BLOOD PRESSURE: 135 MMHG | TEMPERATURE: 98.4 F | WEIGHT: 236 LBS | OXYGEN SATURATION: 97 % | DIASTOLIC BLOOD PRESSURE: 93 MMHG | HEART RATE: 122 BPM | BODY MASS INDEX: 40.29 KG/M2

## 2017-05-31 DIAGNOSIS — M54.42 ACUTE LEFT-SIDED LOW BACK PAIN WITH LEFT-SIDED SCIATICA: ICD-10-CM

## 2017-05-31 PROCEDURE — 96372 THER/PROPH/DIAG INJ SC/IM: CPT

## 2017-05-31 PROCEDURE — 25000128 H RX IP 250 OP 636: Performed by: EMERGENCY MEDICINE

## 2017-05-31 PROCEDURE — 99284 EMERGENCY DEPT VISIT MOD MDM: CPT | Performed by: EMERGENCY MEDICINE

## 2017-05-31 PROCEDURE — 99284 EMERGENCY DEPT VISIT MOD MDM: CPT

## 2017-05-31 RX ORDER — KETOROLAC TROMETHAMINE 30 MG/ML
30 INJECTION, SOLUTION INTRAMUSCULAR; INTRAVENOUS ONCE
Status: COMPLETED | OUTPATIENT
Start: 2017-05-31 | End: 2017-05-31

## 2017-05-31 RX ORDER — OXYCODONE HYDROCHLORIDE 5 MG/1
5 TABLET ORAL EVERY 6 HOURS PRN
Qty: 10 TABLET | Refills: 0 | Status: SHIPPED | OUTPATIENT
Start: 2017-05-31 | End: 2017-07-04

## 2017-05-31 RX ADMIN — KETOROLAC TROMETHAMINE 30 MG: 30 INJECTION, SOLUTION INTRAMUSCULAR at 23:29

## 2017-05-31 NOTE — ED AVS SNAPSHOT
[facility] Emergency Department    [address] [address]    Phone:  [phone]    Fax:  [phone]                                       Maris Wagner   MRN: [id number]    Department:  [facility] Emergency Department   Date of Visit:  5/31/2017           Patient Information     Date Of Birth          1975        Your diagnoses for this visit were:     Acute left-sided low back pain with left-sided sciatica        You were seen by Eddie Du MD.      Follow-up Information     Follow up with Your spine surgeon. Call in 1 day.    Why:  To schedule close follow up        Follow up with Zay Madrid Schedule an appointment as soon as possible for a visit in 1 week.    Specialty:  Family Practice    Why:  For routine follow up    Contact information:    [address] [address]  [phone]          Discharge Instructions         Back Care Tips    Caring for your back  These are things you can do to prevent a recurrence of acute back pain and to reduce symptoms from chronic back pain:    Maintain a healthy weight. If you are overweight, losing weight will help most types of back pain.    Exercise is an important part of recovery from most types of back pain. The back is supported by the muscles behind and in front of the spine. This means both the back muscles and the abdominal muscles must be strengthened to provide better support for your spine.     Swimming and brisk walking are good overall exercises to improve your fitness level.    Practice safe lifting methods (below).    Practice good posture when sitting, standing and walking. Avoid prolonged sitting. This puts more stress on the lower back than standing or walking.    Wear quality shoes with sufficient arch support. Foot and ankle alignment can affect back symptoms. Women should avoid high heels.    Therapeutic massage can help  relax the back muscles without  stretching them.    During the first 24 to 72 hours after an acute injury or flare-up of chronic back pain, apply an ice pack to the painful area for 20 minutes and then remove it for 20 minutes over a period of 60 to 90 minutes or several times a day. As a safety precaution, do not use a heating pad at bedtime. Sleeping on a heating pad can lead to skin burns or tissue damage.    Ice and heat therapies can be alternated.  Medications  Talk to your doctor before using medications, especially if you have other medical problems or are taking other medicines.    You may use acetaminophen or ibuprofen to control pain, unless other pain medicine was prescribed. If you have chronic conditions like diabetes, liver or kidney disease, stomach ulcers or gastrointestinal bleeding, or are taking blood thinners, talk with your doctor before taking any meidcations.    Be careful if you are given prescription pain medicines, narcotics, or medication for muscle spasm. They can cause drowsiness, affect your coordination, reflexes and judgment. Do not drive or operate heavy machinery.  Lumbar stretch  Here is a simple stretching exercise that will help relax muscle spasm and keep your back more limber. If exercise makes your back pain worse, don t do it.    Lie on your back with your knees bent and both feet on the ground.    Slowly raise your left knee to your chest as you flatten your lower back against the floor. Hold for 5 seconds.    Relax and repeat the exercise with your right knee.    Do 10 of these exercises for each leg.  Safe lifting method    Don t bend over at the waist to lift an object off the floor.  Instead, bend your knees and hips in a squat.     Keep your back and head upright    Hold the object close to your body, directly in front of you.    Straighten your legs to lift the object.     Lower the object to the floor in the reverse fashion.    If you must slide something across the floor, push it.  Posture  tips  Sitting  Sit in chairs with straight backs or low-back support. rKeep your k nees lower than your hip, with your feet flat on the floor.  When driving, sit up straight. Adjust the seat forward so you are not leaning toward the steering wheel.  A small pillow or rolled towel behind your lower back may help if you are driving long distances.   Standing  When standing for long periods, shift most of your weight to one leg at a time. Alternate legs every few minutes.   Sleeping  The best way to sleep is on your side with your knees bent. Put a low pillow under your head to support your neck in a neutral spine position. Avoid thick pillows that bend your neck to one side. Put a pillow between your legs to further relax your lower back. If you sleep on your back, put pillows under your knees to support your legs in a slightly flexed position. Use a firm mattress. If your mattress sags, replace it, or use a 1/2-inch plywood board under the mattress to add support.  Follow-up care  Follow up with your health care provider or as directed by our staff.  If X-rays, a CT scan or an MRI scan were taken, they will be reviewed by a radiologist. You will be notified of any new findings that may affect your care.  Call 911  Seek emergency medical care if any of the following occur:    Trouble breathing    Confusion    Very drowsy or trouble breathing    Fainting or loss of consciousness    Rapid or very slow heart rate    Loss of  bwel or bladder control  When to seek medical care  Call your health care provider if any of the following occur:    Pain becomes worse or spreads to your arms or legs    Weakness or numbness in one or both arms or legs    Numbness in the groin area    6062-2475 [employer]. [address] [place] [address], [place] [address]. All rights reserved. This information is not intended as a substitute for professional medical care. Always follow your healthcare professional's  instructions.          Exercises To Strengthen Your Lower Back  Strong lower-back and abdominal muscles work together to support your spine. The exercises below will help strengthen the muscles of the lower back. It is important that you begin exercising slowly and increase levels gradually.  Always begin any exercise program with stretching. If you feel pain while doing any of these exercises, stop and talk to your doctor about a more specific exercise program that better suits your condition.   Low back stretch  The point of stretching is to make you more flexible and increase your range of motion. Stretch only as much as you are able. Stretch slowly. Do not push your stretch to the limit. If at any point you feel pain while stretching, this is your (temporary) limit.    Lie on your back with your knees bent and both feet on the ground.    Slowly raise your left knee to your chest as you flatten your lower back against the floor. Hold for 5 seconds.    Relax and repeat the exercise with your right knee.    Do 10 of these exercises for each leg.    Repeat hugging both knees to your chest at the same time.  Building lower back strength  Start your exercise routine with 10 to 30 minutes a day, 1 to 3 times a day.  Initial exercises  Lying on your back:  1. Ankle pumps: Move your foot up and down, towards your head, and then away. Repeat 10 times with each foot.  2. Heel slides: Slowly bend your knee, drawing the heel of your foot towards you. Then slide your heel/foot from you, straightening your knee. Do not lift your foot off the floor (this is not a leg lift).  3. Abdominal contraction: Bend your knees and put your hands on your stomach. Tighten your stomach muscles. Hold for 5 seconds, then relax. Repeat 10 times.  4. Straight leg raise: Bend one leg at the knee and keep the other leg straight. Tighten your stomach muscles. Slowly lift your straight leg 6 to 12 inches off the floor and hold for up to 5 seconds.  Repeat 10 times on each side.  Standin. Wall squats: Stand with your back against the wall. Move your feet about 12 inches away from the wall. Tighten your stomach muscles, and slowly bend your knees until they are at about a 45 degree angle. Do not go down too far. Hold about 5 seconds. Then slowly return to your starting position. Repeat 10 times.  2. Heel raises: Stand facing the wall. Slowly raise the heels of your feet up and down, while keeping your toes on the floor. If you have trouble balancing, you can touch the wall with your hands. Repeat 10 times.  More advanced exercises  When you feel comfortable enough, try these exercises.  1. Kneeling lumbar extension: Begin on your hands and knees. At the same time, raise and straighten your right arm and left leg until they are parallel to the ground. Hold for 2 seconds and come back slowly to a starting position. Repeat with left arm and right leg, alternating 10 times.  2. Prone lumbar extension: Lie face down, arms extended overhead, palms on the floor. At the same time, raise your right arm and left leg as high as comfortably possible. Hold for 10 seconds and slowly return to start. Repeat with left arm and right leg, alternating 10 times. Gradually build up to 20 times. (Advanced: Repeat this exercise raising both arms and both legs a few inches off the floor at the same time. Hold for 5 seconds and release.)  3. Pelvic tilt: Lie on the floor on your back with your knees bent at 90 degrees. Your feet should be flat on the floor. Inhale, exhale, then slowly contract your abdominal muscles bringing your navel toward your spine. Let your pelvis rock back until your lower back is flat on the floor. Hold for 10 seconds while breathing smoothly.  4. Abdominal crunch: Perform a pelvic tilt (above) flattening your lower back against the floor. Holding the tension in your abdominal muscles, take another breath and raise your shoulder blades off the ground (this  is not a full sit-up). Keep your head in line with your body (don t bend your neck forward). Hold for 2 seconds, then slowly lower.    0948-3412 [employer]. [address] [place] [address], [place] [address]. All rights reserved. This information is not intended as a substitute for professional medical care. Always follow your healthcare professional's instructions.          24 Hour Appointment Hotline       To make an appointment at any [facility], call [phone] ([phone]). If you don't have a family doctor or clinic, we will help you find one. [facility] are conveniently located to serve the needs of you and your family.             Review of your medicines      START taking        Dose / Directions Last dose taken    oxyCODONE 5 MG IR tablet   Commonly known as:  ROXICODONE   Dose:  5 mg   Quantity:  10 tablet        Take 1 tablet (5 mg) by mouth every 6 hours as needed for pain   Refills:  0          Our records show that you are taking the medicines listed below. If these are incorrect, please call your family doctor or clinic.        Dose / Directions Last dose taken    IBUPROFEN PO   Dose:  600 mg        Take 600 mg by mouth every 6 hours as needed for moderate pain   Refills:  0        methylPREDNISolone 4 MG tablet   Commonly known as:  MEDROL DOSEPAK   Quantity:  21 tablet        Follow package instructions   Refills:  0        OMEPRAZOLE PO   Dose:  40 mg        Take 40 mg by mouth every morning   Refills:  0                Prescriptions were sent or printed at these locations (1 Prescription)                   Other Prescriptions                Printed at Department/Unit printer (1 of 1)         oxyCODONE (ROXICODONE) 5 MG IR tablet                Orders Needing Specimen Collection     None      Pending Results     No orders found from 5/29/2017 to 6/1/2017.            Pending Culture Results     No orders found from 5/29/2017 to 6/1/2017.            Pending Results  Instructions     If you had any lab results that were not finalized at the time of your Discharge, you can call the ED Lab Result RN at [phone]. You will be contacted by this team for any positive Lab results or changes in treatment. The nurses are available 7 days a week from 10A to 6:30P.  You can leave a message 24 hours per day and they will return your call.        Test Results From Your Hospital Stay               Thank you for choosing [facility]       Thank you for choosing [facility] for your care. Our goal is always to provide you with excellent care. Hearing back from our patients is one way we can continue to improve our services. Please take a few minutes to complete the written survey that you may receive in the mail after you visit with us. Thank you!        [employer] Information     [employer] gives you secure access to your electronic health record. If you see a primary care provider, you can also send messages to your care team and make appointments. If you have questions, please call your primary care clinic.  If you do not have a primary care provider, please call [phone] and they will assist you.        Care EveryWhere ID     This is your Care EveryWhere ID. This could be used by other organizations to access your [facility] medical records  [id number]        After Visit Summary       This is your record. Keep this with you and show to your community pharmacist(s) and doctor(s) at your next visit.

## 2017-05-31 NOTE — ED AVS SNAPSHOT
[facility] Emergency Department    [address] [facility] [address]    Phone:  [phone]    Fax:  [phone]                                       Maris Wagner   MRN: [id number]    Department:  [facility] Emergency Department   Date of Visit:  5/31/2017           After Visit Summary Signature Page     I have received my discharge instructions, and my questions have been answered. I have discussed any challenges I see with this plan with the nurse or doctor.    ..........................................................................................................................................  Patient/Patient Representative Signature      ..........................................................................................................................................  Patient Representative Print Name and Relationship to Patient    ..................................................               ................................................  Date                                            Time    ..........................................................................................................................................  Reviewed by Signature/Title    ...................................................              ..............................................  Date                                                            Time

## 2017-06-01 ASSESSMENT — ENCOUNTER SYMPTOMS
NECK PAIN: 0
BACK PAIN: 1
LIGHT-HEADEDNESS: 0
SHORTNESS OF BREATH: 0
ABDOMINAL PAIN: 0
APPETITE CHANGE: 0
FATIGUE: 0
WOUND: 0
FEVER: 0
NUMBNESS: 1
CHEST TIGHTNESS: 0
WEAKNESS: 0
DYSURIA: 0

## 2017-06-01 NOTE — ED PROVIDER NOTES
History     Chief Complaint   Patient presents with     Back Pain     s/p L3/L4 fusion April 12 2017.  reports increase in pain last night.  pain radiates down legs.  denies loss of bladder and bowel     HPI  Maris Claudio is a 41 year old female with history of chronic degenerative disc disease and chronic low back pain status post L3/L4 fusion 6 weeks ago presents for evaluation of 44 hours of increased pain in her left low back radiating into her legs.  Patient reports shooting electric shocklike sensations into her leg.  No associated weakness and no new numbness.  Denies bowel or bladder incontinence.  Patient reports symptoms are similar to previous herniated disks.  No trauma associated with the current pain.  No recent weight loss or weight gain    I have reviewed the Medications, Allergies, Past Medical and Surgical History, and Social History in the Epic system.    Date Dispensed/  Date Prescribed Drug Name/  NDC Qty. Dispensed/  Days Supply Refill #/  Authorized Refills RX # Prescriber Dispenser Recipient **MED Daily 05/15/2017  05/15/2017 HYDROCODON- ACETAMINOPHEN 5- 325  [id number] 12  2 0  0 [id number] HARRISON GOODMAN.D.S)  [place], [place]  [id number] [place], [place] MARIS CLAUDIO  1975  [id number] [facility] [address]  [place] [address] 30 05/01/2017 05/01/2017 OXYCODONE HCL 5 MG TABLET  [id number] 120  10 0  0 [id number] ONEIL KAPLAN [place], [place]  [id number] [place], [place] MARIS CLAUDIO  1975  [id number] [facility] [address]  [place] [address] 90 04/15/2017  04/14/2017 OXYCODONE HCL 5 MG TABLET  [id number] 120  10 0  0 [id number] ONEIL KEYG SHAHIDG [place], [place]  [id number] [place], [place] MARIS CLAUDIO  1975  [address]  [place] [address] 04/15/2017  04/14/2017 LYRICA 50 MG CAPSULE  [id number] 60  30 0  3 [id number] ONEIL TING SHAHIDG [place], [place]  [id number] [place], [place] MARIS CLAUDIO  "C  1975  [address]  [place] [id number]      Review of Systems   Constitutional: Negative for appetite change, fatigue and fever.   HENT: Negative for congestion.    Respiratory: Negative for chest tightness and shortness of breath.    Cardiovascular: Negative for chest pain.   Gastrointestinal: Negative for abdominal pain.   Genitourinary: Negative for dysuria.   Musculoskeletal: Positive for back pain. Negative for gait problem and neck pain.   Skin: Negative for rash and wound.   Neurological: Positive for numbness (chronic, unchanged). Negative for weakness and light-headedness.   All other systems reviewed and are negative.      Physical Exam   BP: (!) 136/94  Pulse: 122  Temp: 98.4  F (36.9  C)  Resp: 18  Height: 162.6 cm (5' 4\")  Weight: 107 kg (236 lb)  SpO2: 97 %  Physical Exam   Constitutional: She is oriented to person, place, and time. She appears well-developed and well-nourished. No distress.   HENT:   Head: Normocephalic and atraumatic.   Cardiovascular: Normal rate.    Pulmonary/Chest: Effort normal.   Musculoskeletal:        Lumbar back: She exhibits tenderness and pain. She exhibits normal range of motion (patient is able to move with relative ease sitting up and twisting on the bed to allow me to examine her back), no swelling, no edema and no spasm.        Back:    Neurological: She is alert and oriented to person, place, and time.   Straight leg raise does not appear to worsen her back pain or cause sciatica however she does report shooting, electric sensations and pain shooting down her left leg from her back   Skin: Skin is warm.   Psychiatric: She has a normal mood and affect.   Nursing note and vitals reviewed.      ED Course     ED Course     Procedures           Labs Ordered and Resulted from Time of ED Arrival Up to the Time of Departure from the ED - No data to display    Assessments & Plan (with Medical Decision Making)  41-year-old female with history of degenerative disc disease and " multiple back surgeries in the past presents for evaluation of worsening left lower back pain radiating to her leg with sharp electric shooting sensation suggestive of sciatica.  No new neurologic symptoms.  No bladder or bowel incontinence to suggest cauda equina syndrome.  No recent trauma to suggest fracture.  The patient and significant other counseled regarding chronic recurrent nature of low back pain and the need for close follow-up and likely physical therapy.  Patient given a very short-term prescription for oxycodone for pain control and advised to use minimally and for no more than 1 week.  Recent prescription database does show extensive long-term use of opiates.  Patient counseled regarding the high risk of, occasions with long-term use of opiates and the relative futility in treatment of sciatica and other nervelike pain.       I have reviewed the nursing notes.    I have reviewed the findings, diagnosis, plan and need for follow up with the patient.    Discharge Medication List as of 5/31/2017 11:30 PM      START taking these medications    Details   oxyCODONE (ROXICODONE) 5 MG IR tablet Take 1 tablet (5 mg) by mouth every 6 hours as needed for pain, Disp-10 tablet, R-0, Local Print             Final diagnoses:   Acute left-sided low back pain with left-sided sciatica       5/31/2017   [facility] EMERGENCY DEPARTMENT     Du, Eddie Schafer MD  06/01/17 025

## 2017-06-01 NOTE — ED NOTES
Pt presents to ED with complaints of pain down her right leg, left toe yumiko and pain in the upper left buttock/lower left back. Pt had fusion L5/S1 and L3/L4. Most recent of 5 back surgeries was April 12th. Pt denies recent trauma. Pt taking gabapentin, tizanidine, and duloxetine without relief. Next follow up with surgeon scheduled for 6/8.

## 2017-06-01 NOTE — DISCHARGE INSTRUCTIONS
Back Care Tips    Caring for your back  These are things you can do to prevent a recurrence of acute back pain and to reduce symptoms from chronic back pain:    Maintain a healthy weight. If you are overweight, losing weight will help most types of back pain.    Exercise is an important part of recovery from most types of back pain. The back is supported by the muscles behind and in front of the spine. This means both the back muscles and the abdominal muscles must be strengthened to provide better support for your spine.     Swimming and brisk walking are good overall exercises to improve your fitness level.    Practice safe lifting methods (below).    Practice good posture when sitting, standing and walking. Avoid prolonged sitting. This puts more stress on the lower back than standing or walking.    Wear quality shoes with sufficient arch support. Foot and ankle alignment can affect back symptoms. Women should avoid high heels.    Therapeutic massage can help  relax the back muscles without stretching them.    During the first 24 to 72 hours after an acute injury or flare-up of chronic back pain, apply an ice pack to the painful area for 20 minutes and then remove it for 20 minutes over a period of 60 to 90 minutes or several times a day. As a safety precaution, do not use a heating pad at bedtime. Sleeping on a heating pad can lead to skin burns or tissue damage.    Ice and heat therapies can be alternated.  Medications  Talk to your doctor before using medications, especially if you have other medical problems or are taking other medicines.    You may use acetaminophen or ibuprofen to control pain, unless other pain medicine was prescribed. If you have chronic conditions like diabetes, liver or kidney disease, stomach ulcers or gastrointestinal bleeding, or are taking blood thinners, talk with your doctor before taking any meidcations.    Be careful if you are given prescription pain medicines, narcotics, or  medication for muscle spasm. They can cause drowsiness, affect your coordination, reflexes and judgment. Do not drive or operate heavy machinery.  Lumbar stretch  Here is a simple stretching exercise that will help relax muscle spasm and keep your back more limber. If exercise makes your back pain worse, don t do it.    Lie on your back with your knees bent and both feet on the ground.    Slowly raise your left knee to your chest as you flatten your lower back against the floor. Hold for 5 seconds.    Relax and repeat the exercise with your right knee.    Do 10 of these exercises for each leg.  Safe lifting method    Don t bend over at the waist to lift an object off the floor.  Instead, bend your knees and hips in a squat.     Keep your back and head upright    Hold the object close to your body, directly in front of you.    Straighten your legs to lift the object.     Lower the object to the floor in the reverse fashion.    If you must slide something across the floor, push it.  Posture tips  Sitting  Sit in chairs with straight backs or low-back support. rKeep your k nees lower than your hip, with your feet flat on the floor.  When driving, sit up straight. Adjust the seat forward so you are not leaning toward the steering wheel.  A small pillow or rolled towel behind your lower back may help if you are driving long distances.   Standing  When standing for long periods, shift most of your weight to one leg at a time. Alternate legs every few minutes.   Sleeping  The best way to sleep is on your side with your knees bent. Put a low pillow under your head to support your neck in a neutral spine position. Avoid thick pillows that bend your neck to one side. Put a pillow between your legs to further relax your lower back. If you sleep on your back, put pillows under your knees to support your legs in a slightly flexed position. Use a firm mattress. If your mattress sags, replace it, or use a 1/2-inch plywood board  under the mattress to add support.  Follow-up care  Follow up with your health care provider or as directed by our staff.  If X-rays, a CT scan or an MRI scan were taken, they will be reviewed by a radiologist. You will be notified of any new findings that may affect your care.  Call 911  Seek emergency medical care if any of the following occur:    Trouble breathing    Confusion    Very drowsy or trouble breathing    Fainting or loss of consciousness    Rapid or very slow heart rate    Loss of  bwel or bladder control  When to seek medical care  Call your health care provider if any of the following occur:    Pain becomes worse or spreads to your arms or legs    Weakness or numbness in one or both arms or legs    Numbness in the groin area    2024-5513 [employer]. [address] [place] [address], [place] [address]. All rights reserved. This information is not intended as a substitute for professional medical care. Always follow your healthcare professional's instructions.          Exercises To Strengthen Your Lower Back  Strong lower-back and abdominal muscles work together to support your spine. The exercises below will help strengthen the muscles of the lower back. It is important that you begin exercising slowly and increase levels gradually.  Always begin any exercise program with stretching. If you feel pain while doing any of these exercises, stop and talk to your doctor about a more specific exercise program that better suits your condition.   Low back stretch  The point of stretching is to make you more flexible and increase your range of motion. Stretch only as much as you are able. Stretch slowly. Do not push your stretch to the limit. If at any point you feel pain while stretching, this is your (temporary) limit.    Lie on your back with your knees bent and both feet on the ground.    Slowly raise your left knee to your chest as you flatten your lower back against the floor. Hold for 5  seconds.    Relax and repeat the exercise with your right knee.    Do 10 of these exercises for each leg.    Repeat hugging both knees to your chest at the same time.  Building lower back strength  Start your exercise routine with 10 to 30 minutes a day, 1 to 3 times a day.  Initial exercises  Lying on your back:  1. Ankle pumps: Move your foot up and down, towards your head, and then away. Repeat 10 times with each foot.  2. Heel slides: Slowly bend your knee, drawing the heel of your foot towards you. Then slide your heel/foot from you, straightening your knee. Do not lift your foot off the floor (this is not a leg lift).  3. Abdominal contraction: Bend your knees and put your hands on your stomach. Tighten your stomach muscles. Hold for 5 seconds, then relax. Repeat 10 times.  4. Straight leg raise: Bend one leg at the knee and keep the other leg straight. Tighten your stomach muscles. Slowly lift your straight leg 6 to 12 inches off the floor and hold for up to 5 seconds. Repeat 10 times on each side.  Standin. Wall squats: Stand with your back against the wall. Move your feet about 12 inches away from the wall. Tighten your stomach muscles, and slowly bend your knees until they are at about a 45 degree angle. Do not go down too far. Hold about 5 seconds. Then slowly return to your starting position. Repeat 10 times.  2. Heel raises: Stand facing the wall. Slowly raise the heels of your feet up and down, while keeping your toes on the floor. If you have trouble balancing, you can touch the wall with your hands. Repeat 10 times.  More advanced exercises  When you feel comfortable enough, try these exercises.  1. Kneeling lumbar extension: Begin on your hands and knees. At the same time, raise and straighten your right arm and left leg until they are parallel to the ground. Hold for 2 seconds and come back slowly to a starting position. Repeat with left arm and right leg, alternating 10 times.  2. Prone  lumbar extension: Lie face down, arms extended overhead, palms on the floor. At the same time, raise your right arm and left leg as high as comfortably possible. Hold for 10 seconds and slowly return to start. Repeat with left arm and right leg, alternating 10 times. Gradually build up to 20 times. (Advanced: Repeat this exercise raising both arms and both legs a few inches off the floor at the same time. Hold for 5 seconds and release.)  3. Pelvic tilt: Lie on the floor on your back with your knees bent at 90 degrees. Your feet should be flat on the floor. Inhale, exhale, then slowly contract your abdominal muscles bringing your navel toward your spine. Let your pelvis rock back until your lower back is flat on the floor. Hold for 10 seconds while breathing smoothly.  4. Abdominal crunch: Perform a pelvic tilt (above) flattening your lower back against the floor. Holding the tension in your abdominal muscles, take another breath and raise your shoulder blades off the ground (this is not a full sit-up). Keep your head in line with your body (don t bend your neck forward). Hold for 2 seconds, then slowly lower.    1451-2303 [employer]. [address] [place] [address], [place] [address]. All rights reserved. This information is not intended as a substitute for professional medical care. Always follow your healthcare professional's instructions.

## 2017-06-28 ENCOUNTER — HOSPITAL ENCOUNTER (OUTPATIENT)
Dept: MRI IMAGING | Facility: CLINIC | Age: 42
Discharge: HOME | End: 2017-06-28
Attending: PODIATRIST | Admitting: PODIATRIST
Payer: COMMERCIAL

## 2017-06-28 DIAGNOSIS — M25.579 ANKLE PAIN: ICD-10-CM

## 2017-06-28 PROCEDURE — 73721 MRI JNT OF LWR EXTRE W/O DYE: CPT | Mod: RT

## 2017-07-04 ENCOUNTER — HOSPITAL ENCOUNTER (EMERGENCY)
Facility: CLINIC | Age: 42
Discharge: HOME | End: 2017-07-04
Attending: FAMILY MEDICINE | Admitting: FAMILY MEDICINE
Payer: COMMERCIAL

## 2017-07-04 ENCOUNTER — APPOINTMENT (OUTPATIENT)
Dept: GENERAL RADIOLOGY | Facility: CLINIC | Age: 42
End: 2017-07-04
Attending: FAMILY MEDICINE
Payer: COMMERCIAL

## 2017-07-04 VITALS
OXYGEN SATURATION: 99 % | RESPIRATION RATE: 22 BRPM | SYSTOLIC BLOOD PRESSURE: 134 MMHG | TEMPERATURE: 98.8 F | DIASTOLIC BLOOD PRESSURE: 82 MMHG

## 2017-07-04 DIAGNOSIS — J98.8 WHEEZING-ASSOCIATED RESPIRATORY INFECTION: ICD-10-CM

## 2017-07-04 PROCEDURE — 25000125 ZZHC RX 250: Performed by: FAMILY MEDICINE

## 2017-07-04 PROCEDURE — 99283 EMERGENCY DEPT VISIT LOW MDM: CPT

## 2017-07-04 PROCEDURE — 99284 EMERGENCY DEPT VISIT MOD MDM: CPT | Performed by: FAMILY MEDICINE

## 2017-07-04 PROCEDURE — 71020 XR CHEST 2 VW: CPT

## 2017-07-04 RX ORDER — PREDNISONE 20 MG/1
40 TABLET ORAL DAILY
Qty: 10 TABLET | Refills: 0 | Status: SHIPPED | OUTPATIENT
Start: 2017-07-04 | End: 2017-07-09

## 2017-07-04 RX ORDER — ALBUTEROL SULFATE 90 UG/1
2 AEROSOL, METERED RESPIRATORY (INHALATION) EVERY 6 HOURS
COMMUNITY

## 2017-07-04 RX ORDER — PREDNISONE 20 MG/1
40 TABLET ORAL ONCE
Status: COMPLETED | OUTPATIENT
Start: 2017-07-04 | End: 2017-07-04

## 2017-07-04 RX ADMIN — PREDNISONE 40 MG: 20 TABLET ORAL at 06:56

## 2017-07-04 NOTE — DISCHARGE INSTRUCTIONS
Take prednisone 20 mg 2 pills in the morning with food for 5 days.  Use albuterol inhaler, 2 puffs up to every 4 hours if needed for shortness of breath or wheezing.  Follow-up in 2 months and primary care for spirometry or pulmonary function tests  Return if fevers greater than 100.4, worsening shortness of breath, chest pain, or other new concerning symptoms.  Quit smoking: www.quitplan.com

## 2017-07-04 NOTE — ED AVS SNAPSHOT
[facility] Emergency Department    [address] [address]    Phone:  [phone]    Fax:  [phone]                                       Maris Wagner   MRN: [id number]    Department:  [facility] Emergency Department   Date of Visit:  7/4/2017           Patient Information     Date Of Birth          1975        Your diagnoses for this visit were:     Wheezing-associated respiratory infection        You were seen by Brown Chua MD.      Follow-up Information     Follow up with Zay Madrid Schedule an appointment as soon as possible for a visit in 2 months.    Specialty:  Family Practice    Why:  for recheck of asthma    Contact information:    [address] [address] [phone]          Discharge Instructions       Take prednisone 20 mg 2 pills in the morning with food for 5 days.  Use albuterol inhaler, 2 puffs up to every 4 hours if needed for shortness of breath or wheezing.  Follow-up in 2 months and primary care for spirometry or pulmonary function tests  Return if fevers greater than 100.4, worsening shortness of breath, chest pain, or other new concerning symptoms.  Quit smoking: www.quitplan.[employer]    24 Hour Appointment Hotline       To make an appointment at any [facility], call [phone] ([phone]). If you don't have a family doctor or clinic, we will help you find one. [facility] clinics are conveniently located to serve the needs of you and your family.             Review of your medicines      START taking        Dose / Directions Last dose taken    predniSONE 20 MG tablet   Commonly known as:  DELTASONE   Dose:  40 mg   Quantity:  10 tablet        Take 2 tablets (40 mg) by mouth daily for 5 days   Refills:  0          Our records show that you are taking the medicines listed below. If these are incorrect, please call your family doctor or clinic.        Dose / Directions Last dose taken     albuterol 108 (90 BASE) MCG/ACT Inhaler   Commonly known as:  PROAIR HFA/PROVENTIL HFA/VENTOLIN HFA   Dose:  2 puff        Inhale 2 puffs into the lungs every 6 hours   Refills:  0        IBUPROFEN PO   Dose:  600 mg        Take 600 mg by mouth every 6 hours as needed for moderate pain   Refills:  0        OMEPRAZOLE PO   Dose:  40 mg        Take 40 mg by mouth every morning   Refills:  0                Prescriptions were sent or printed at these locations (1 Prescription)                   [facility] [place], [place] - [address] [facility] [facility] [address] [facility]   [address] [facility] [facility] [address]    Telephone:  [phone]   Fax:  [phone]   Hours:                  E-Prescribed (1 of 1)         predniSONE (DELTASONE) 20 MG tablet                Procedures and tests performed during your visit     XR Chest 2 Views      Orders Needing Specimen Collection     None      Pending Results     No orders found from 7/2/2017 to 7/5/2017.            Pending Culture Results     No orders found from 7/2/2017 to 7/5/2017.            Pending Results Instructions     If you had any lab results that were not finalized at the time of your Discharge, you can call the ED Lab Result RN at [phone]. You will be contacted by this team for any positive Lab results or changes in treatment. The nurses are available 7 days a week from 10A to 6:30P.  You can leave a message 24 hours per day and they will return your call.        Test Results From Your Hospital Stay        7/4/2017  6:41 AM      Narrative     CHEST TWO VIEWS  7/4/2017 6:37 AM     HISTORY: Cough. Dyspnea.    COMPARISON: None.        Impression     IMPRESSION: Negative chest. Lungs clear.    CRYSTAL SOARES MD                Thank you for choosing [facility]       Thank you for choosing [facility] for your care. Our goal is always to provide you with excellent care. Hearing back from our patients is one way we can continue to improve our services. Please take a few minutes to  complete the written survey that you may receive in the mail after you visit with us. Thank you!        [facility] Information     [employer] gives you secure access to your electronic health record. If you see a primary care provider, you can also send messages to your care team and make appointments. If you have questions, please call your primary care clinic.  If you do not have a primary care provider, please call [phone] and they will assist you.        Care EveryWhere ID     This is your Care EveryWhere ID. This could be used by other organizations to access your [facility] medical records  [id number]        Equal Access to Services     [facility] Center[facility]ATUMA : Omayra Penny, renny aldrich, cierra self, reginald paula. So [facility] [phone].    ATENCIÓN: Si habla español, tiene a cabrales disposición servicios gratuitos de asistencia lingüística. Llame al [phone].    We comply with applicable federal civil rights laws and [place] laws. We do not discriminate on the basis of race, color, national origin, age, disability sex, sexual orientation or gender identity.            After Visit Summary       This is your record. Keep this with you and show to your community pharmacist(s) and doctor(s) at your next visit.

## 2017-07-04 NOTE — ED PROVIDER NOTES
"  History     Chief Complaint   Patient presents with     Cough     cough- non productive for approx 1 week. Right ear pain     HPI  Maris Wagner is a 42 year old female who presents with a cough.  Symptoms weren't present for a week.  She has not had fever.  She hasn't had chest pain.  She does have a sore throat.  Her right ear feels plugged.  She has chronic back pain and had back fusion surgery in April.  She continues to smoke.  She has not been diagnosed with asthma but has needed inhalers with most respiratory infections.     I have reviewed the Medications, Allergies, Past Medical and Surgical History, and Social History in the Epic system.    Allergies: No Known Allergies      No current facility-administered medications on file prior to encounter.   Current Outpatient Prescriptions on File Prior to Encounter:  IBUPROFEN PO Take 600 mg by mouth every 6 hours as needed for moderate pain   OMEPRAZOLE PO Take 40 mg by mouth every morning       Patient Active Problem List   Diagnosis     CARDIOVASCULAR SCREENING; LDL GOAL LESS THAN 130     Ileus, postoperative (H)     Spinal stenosis of lumbar region     Anemia     Gastroesophageal reflux disease     Irritable bowel syndrome     Seasonal allergies     Obesity       Past Surgical History:   Procedure Laterality Date     REMOVE FOREIGN BODY FINGER Left 3/28/2017    Procedure: REMOVE FOREIGN BODY FINGER;  Surgeon: Tabby Chan MD;  Location: [facility] OR     TUBAL LIGATION         Social History   Substance Use Topics     Smoking status: Current Every Day Smoker     Packs/day: 1.00     Types: Cigarettes     Smokeless tobacco: Never Used     Alcohol use Yes      Comment: rare       Most Recent Immunizations   Administered Date(s) Administered     Pneumococcal 23 valent 12/10/2012     TDAP Vaccine (Adacel) 12/10/2012       BMI: Estimated body mass index is 40.51 kg/(m^2) as calculated from the following:    Height as of 5/31/17: 1.626 m (5' 4\").    Weight as of " 5/31/17: 107 kg (236 lb).      Review of Systems  Further problem focused system review negative.    Physical Exam   BP: 134/82  Heart Rate: 90  Temp: 98.8  F (37.1  C)  Resp: 22  SpO2: 99 %  Physical Exam  Nursing note and vitals were reviewed.  Constitutional: Awake and alert, mildly ill but nontoxic appearing 42-year-old no acute distress, who answers questions appropriately and cooperates with examination.  HEENT: EACs clear.  TMs normal.  PERRLA.  EOMI.   Neck: Freely mobile.  Cardiovascular: Cardiac examination reveals normal heart rate and regular rhythm without murmur.  Pulmonary/Chest: Breathing is unlabored.  There are scattered bilateral wheezes with mild prolongation of the expiratory phase retractions or tachypnea.  No rales or rhonchi.  Neurological: Alert, oriented, thought content logical, coherent   Skin: Warm, dry, no rashes.  Psychiatric: Affect broad and appropriate.      ED Course     ED Course     Procedures          Critical Care time:  none            Results for orders placed or performed during the hospital encounter of 07/04/17 (from the past 24 hour(s))   XR Chest 2 Views    Narrative    CHEST TWO VIEWS  7/4/2017 6:37 AM     HISTORY: Cough. Dyspnea.    COMPARISON: None.      Impression    IMPRESSION: Negative chest. Lungs clear.    CRYSTAL SOARES MD         Assessments & Plan (with Medical Decision Making)     42-year-old female presents with 1 week of cough with wheezing and physical examination and no signs of bacterial pneumonia.  Chest x-ray was reviewed by me independently in by the radiologist.  It was normal.  There is no evidence of an infiltrate I have low suspicion for alternative causes of wheezing such as CHF.  Symptoms are consistent with viral respiratory infection with associated asthmatic reaction.  Apparently this has been a frequent problem in the past and I discussed with she and her  that this likely represents asthma.  She will take a 5 day course of  prednisone and use her inhalers every 4 hours if needed.  I have recommended follow-up in 1-2 months when she has recovered for spirometry or pulmonary function tests to ensure airway inflammation is resolved and that she is not under appreciating asthma.  Return if fevers, worsening dyspnea, new concerning symptoms.    I have reviewed the nursing notes.    I have reviewed the findings, diagnosis, plan and need for follow up with the patient.       New Prescriptions    PREDNISONE (DELTASONE) 20 MG TABLET    Take 2 tablets (40 mg) by mouth daily for 5 days       Final diagnoses:   Wheezing-associated respiratory infection       7/4/2017   [facility] EMERGENCY DEPARTMENT     Brown Chua MD  07/04/17 0653       Brown Chua MD  07/04/17 0655

## 2017-07-04 NOTE — ED NOTES
"Patient presents with complaints of \"possibly pneumonia or bronchitis\".  Pt states that she has had a cough for approx 1 week.  It is non productive.  She denies fevers or chills.  She does have shortness of breath with activity. She does smoke approx 1 ppd.   "

## 2017-07-08 ENCOUNTER — HEALTH MAINTENANCE LETTER (OUTPATIENT)
Age: 42
End: 2017-07-08

## 2017-07-21 ENCOUNTER — HOSPITAL ENCOUNTER (EMERGENCY)
Facility: CLINIC | Age: 42
Discharge: HOME | End: 2017-07-21
Attending: PHYSICIAN ASSISTANT | Admitting: PHYSICIAN ASSISTANT
Payer: COMMERCIAL

## 2017-07-21 ENCOUNTER — APPOINTMENT (OUTPATIENT)
Dept: GENERAL RADIOLOGY | Facility: CLINIC | Age: 42
End: 2017-07-21
Attending: PHYSICIAN ASSISTANT
Payer: COMMERCIAL

## 2017-07-21 VITALS
RESPIRATION RATE: 18 BRPM | TEMPERATURE: 97.4 F | DIASTOLIC BLOOD PRESSURE: 101 MMHG | BODY MASS INDEX: 40.68 KG/M2 | WEIGHT: 237 LBS | HEART RATE: 105 BPM | SYSTOLIC BLOOD PRESSURE: 154 MMHG | OXYGEN SATURATION: 98 %

## 2017-07-21 DIAGNOSIS — M79.661 PAIN OF RIGHT LOWER LEG: ICD-10-CM

## 2017-07-21 DIAGNOSIS — T07.XXXA ABRASIONS OF MULTIPLE SITES: ICD-10-CM

## 2017-07-21 DIAGNOSIS — M79.671 BILATERAL FOOT PAIN: ICD-10-CM

## 2017-07-21 DIAGNOSIS — M79.672 BILATERAL FOOT PAIN: ICD-10-CM
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
## 2017-07-21 PROCEDURE — 73630 X-RAY EXAM OF FOOT: CPT | Mod: 50

## 2017-07-21 PROCEDURE — 73590 X-RAY EXAM OF LOWER LEG: CPT | Mod: RT

## 2017-07-21 PROCEDURE — 99213 OFFICE O/P EST LOW 20 MIN: CPT | Performed by: PHYSICIAN ASSISTANT

## 2017-07-21 PROCEDURE — 99213 OFFICE O/P EST LOW 20 MIN: CPT

## 2017-07-21 ASSESSMENT — ENCOUNTER SYMPTOMS
WOUND: 1
NEUROLOGICAL NEGATIVE: 1
CONSTITUTIONAL NEGATIVE: 1

## 2017-07-21 NOTE — ED NOTES
Patient here for pain in the left ankle - fell about an hour ago.   Patient presents in wheelchair to the urgent care.

## 2017-07-21 NOTE — ED AVS SNAPSHOT
[facility]    [address] [facility] [facility] [address]    Phone:  [phone]    Fax:  [phone]                                       Maris Wagner   MRN: [id number]    Department:  [facility]   Date of Visit:  7/21/2017           After Visit Summary Signature Page     I have received my discharge instructions, and my questions have been answered. I have discussed any challenges I see with this plan with the nurse or doctor.    ..........................................................................................................................................  Patient/Patient Representative Signature      ..........................................................................................................................................  Patient Representative Print Name and Relationship to Patient    ..................................................               ................................................  Date                                            Time    ..........................................................................................................................................  Reviewed by Signature/Title    ...................................................              ..............................................  Date                                                            Time

## 2017-07-21 NOTE — ED PROVIDER NOTES
History     Chief Complaint   Patient presents with     Ankle Pain     abrasions     HPI  Maris Wagner is a 42 year old female with hx GERD, lumbar spinal stenosis, anemia who presents with complaints of right leg and bilateral foot pain since she fell today.  States she has neuropathy in her left foot and this foot gave-out on her today, and she fell against the concrete, landing on a bent right knee and twisting both feet in the process.  She sustained abrasions to her right knee and right toes.  She describes pain to these areas especially with weightbearing.  Pt also notes that she is scheduled to have surgery on there right ankle next month to repair torn ligaments and an old distal fibula fracture which occurred last December.  She denies head trauma or LOC from the fall.    I have reviewed the Medications, Allergies, Past Medical and Surgical History, and Social History in the Epic system.    Allergies: No Known Allergies      No current facility-administered medications on file prior to encounter.   Current Outpatient Prescriptions on File Prior to Encounter:  albuterol (PROAIR HFA/PROVENTIL HFA/VENTOLIN HFA) 108 (90 BASE) MCG/ACT Inhaler Inhale 2 puffs into the lungs every 6 hours   IBUPROFEN PO Take 600 mg by mouth every 6 hours as needed for moderate pain   OMEPRAZOLE PO Take 40 mg by mouth every morning       Patient Active Problem List   Diagnosis     CARDIOVASCULAR SCREENING; LDL GOAL LESS THAN 130     Ileus, postoperative (H)     Spinal stenosis of lumbar region     Anemia     Gastroesophageal reflux disease     Irritable bowel syndrome     Seasonal allergies     Obesity       Past Surgical History:   Procedure Laterality Date     REMOVE FOREIGN BODY FINGER Left 3/28/2017    Procedure: REMOVE FOREIGN BODY FINGER;  Surgeon: Tabby Chan MD;  Location: [facility] OR     TUBAL LIGATION         Social History   Substance Use Topics     Smoking status: Current Every Day Smoker     Packs/day: 1.00      "Types: Cigarettes     Smokeless tobacco: Never Used     Alcohol use Yes      Comment: rare       Most Recent Immunizations   Administered Date(s) Administered     Pneumococcal 23 valent 12/10/2012     TDAP Vaccine (Adacel) 12/10/2012       BMI: Estimated body mass index is 40.68 kg/(m^2) as calculated from the following:    Height as of 5/31/17: 1.626 m (5' 4\").    Weight as of this encounter: 107.5 kg (237 lb).      Review of Systems   Constitutional: Negative.    Musculoskeletal:        Right leg and bilateral foot pain   Skin: Positive for wound (abrasions).   Neurological: Negative.    All other systems reviewed and are negative.      Physical Exam   BP: (!) 154/101  Pulse: 105  Temp: 97.4  F (36.3  C)  Resp: 18  Weight: 107.5 kg (237 lb)  SpO2: 98 %  Physical Exam   Constitutional: She appears well-developed and well-nourished. No distress.   HENT:   Head: Normocephalic and atraumatic.   Cardiovascular: Intact distal pulses.    Musculoskeletal:        Right ankle: Normal. She exhibits normal range of motion and no swelling. No tenderness.        Left ankle: Normal. She exhibits normal range of motion and no swelling. No tenderness.        Right lower leg: She exhibits tenderness. She exhibits no swelling, no edema and no deformity.        Right foot: There is tenderness. There is normal range of motion, no swelling, normal capillary refill, no crepitus and no deformity.        Left foot: There is tenderness. There is normal range of motion, no swelling, normal capillary refill, no crepitus and no deformity.   Tenderness and abrasion to right proximal tibia.  Pt also has tenderness along bilateral 5th metatarsals.  There are abrasions to dorsum of left 2nd and 3rd toes as well.  No associated swelling.  No ankle tenderness.   Neurological: She is alert. She has normal strength. No sensory deficit.   Skin: Skin is warm and dry.       ED Course     ED Course     Procedures    Results for orders placed or performed " during the hospital encounter of 07/21/17   Tib/Fib XR, right    Narrative    XR TIBIA & FIBULA RT 2 VW 7/21/2017 2:19 PM    HISTORY: Fall, proximal tibial swelling.    COMPARISON: None.    FINDINGS: No fracture or malalignment. Osseous structures are within  normal limits.      Impression    IMPRESSION: No acute osseous abnormality.    ALBERTINA RODRIGUEZ MD   XR Foot Bilateral G/E 3 Views    Narrative    XR FOOT BILATERAL G/E 3 VW 7/21/2017 2:18 PM    HISTORY: Fall, fifth metatarsal tenderness.    COMPARISON: Left foot, 1/17/2016.    FINDINGS: 3 views of the right foot. No fracture or malalignment.  Osseous structures are within normal limits.    3 views of the left foot. No fracture or malalignment. Osseous  structures are within normal limits.      Impression    IMPRESSION: No acute osseous abnormality.    ALBERTINA RODRIGUEZ MD       Assessments & Plan (with Medical Decision Making)     Pt is a 42 year old female with hx GERD, lumbar spinal stenosis, anemia who presents with complaints of right leg and bilateral foot pain since she fell today.  States she has neuropathy in her left foot and this foot gave-out on her today, and she fell against the concrete, landing on a bent right knee and twisting both feet in the process.  She sustained abrasions to her right knee and right toes.  She describes pain to these areas especially with weightbearing.  Pt also notes that she is scheduled to have surgery on there right ankle next month to repair torn ligaments and an old distal fibula fracture which occurred last December.  Pt is afebrile on arrival.  Exam as above.  X-rays of right tib/fib and bilateral feet were negative for fracture or acute pathology.  Discussed results with patient.  Abrasions were cleaned and Bacitracin was applied.  Encouraged symptomatic treatments at home.  Hand-outs provided.    Patient was instructed to follow-up with PCP if no improvement in a week for continued care and management or sooner if new  or worsening symptoms.  She is to return to the ED for persistent and/or worsening symptoms.  Patient expressed understanding of the diagnosis and plan and was discharged home in good condition.    I have reviewed the nursing notes.    I have reviewed the findings, diagnosis, plan and need for follow up with the patient.    New Prescriptions    No medications on file       Final diagnoses:   Fall, initial encounter   Bilateral foot pain   Pain of right lower leg   Abrasions of multiple sites       7/21/2017   [facility] EMERGENCY DEPARTMENT     Arielle Mills PA-C  07/21/17 1436       Arielle Mills PA-C  07/21/17 1436       Arielle Mills PA-C  07/21/17 1431

## 2017-07-21 NOTE — ED AVS SNAPSHOT
[facility] Emergency Department    [address] [address]    Phone:  [phone]    Fax:  [phone]                                       Maris Wagner   MRN: [id number]    Department:  [facility] Emergency Department   Date of Visit:  7/21/2017           Patient Information     Date Of Birth          1975        Your diagnoses for this visit were:     Fall, initial encounter     Bilateral foot pain     Pain of right lower leg     Abrasions of multiple sites        You were seen by Arielle Mills PA-C.      Follow-up Information     Follow up with [facility] in 1 week.    Specialty:  Family Practice    Why:  As needed, For persistent symptoms    Contact information:    [address] [address] [phone]          Follow up with [facility] Emergency Department.    Specialty:  EMERGENCY MEDICINE    Why:  As needed, If symptoms worsen    Contact information:    [address] [address] [phone]    Additional information:    The medical center is located at   [address] ([address] in [place], [place] miles north   of [place]).      Discharge References/Attachments     ABRASIONS (ENGLISH)    STRAINS AND SPRAINS, TREATING (ENGLISH)      24 Hour Appointment Hotline       To make an appointment at any [facility] clinic, call [phone] ([phone]). If you don't have a family doctor or clinic, we will help you find one. [facility] clinics are conveniently located to serve the needs of you and your family.             Review of your medicines      Our records show that you are taking the medicines listed below. If these are incorrect, please call your family doctor or clinic.        Dose / Directions Last dose taken    albuterol 108 (90 BASE) MCG/ACT Inhaler   Commonly known as:  PROAIR HFA/PROVENTIL HFA/VENTOLIN HFA   Dose:  2 puff        Inhale 2 puffs into the  lungs every 6 hours   Refills:  0        IBUPROFEN PO   Dose:  600 mg        Take 600 mg by mouth every 6 hours as needed for moderate pain   Refills:  0        OMEPRAZOLE PO   Dose:  40 mg        Take 40 mg by mouth every morning   Refills:  0                Procedures and tests performed during your visit     Tib/Fib XR, right    XR Foot Bilateral G/E 3 Views      Orders Needing Specimen Collection     None      Pending Results     No orders found from 7/19/2017 to 7/22/2017.            Pending Culture Results     No orders found from 7/19/2017 to 7/22/2017.            Pending Results Instructions     If you had any lab results that were not finalized at the time of your Discharge, you can call the ED Lab Result RN at [phone]. You will be contacted by this team for any positive Lab results or changes in treatment. The nurses are available 7 days a week from 10A to 6:30P.  You can leave a message 24 hours per day and they will return your call.        Test Results From Your Hospital Stay        7/21/2017  2:23 PM      Narrative     XR TIBIA & FIBULA RT 2 VW 7/21/2017 2:19 PM    HISTORY: Fall, proximal tibial swelling.    COMPARISON: None.    FINDINGS: No fracture or malalignment. Osseous structures are within  normal limits.        Impression     IMPRESSION: No acute osseous abnormality.    ALBERTINA RODRIGUEZ MD         7/21/2017  2:22 PM      Narrative     XR FOOT BILATERAL G/E 3 VW 7/21/2017 2:18 PM    HISTORY: Fall, fifth metatarsal tenderness.    COMPARISON: Left foot, 1/17/2016.    FINDINGS: 3 views of the right foot. No fracture or malalignment.  Osseous structures are within normal limits.    3 views of the left foot. No fracture or malalignment. Osseous  structures are within normal limits.        Impression     IMPRESSION: No acute osseous abnormality.    ALBERTINA RODRIGUEZ MD                Thank you for choosing [facility]       Thank you for choosing [facility] for your care. Our goal is always to provide you  with excellent care. Hearing back from our patients is one way we can continue to improve our services. Please take a few minutes to complete the written survey that you may receive in the mail after you visit with us. Thank you!        [employer] Information     [employer] gives you secure access to your electronic health record. If you see a primary care provider, you can also send messages to your care team and make appointments. If you have questions, please call your primary care clinic.  If you do not have a primary care provider, please call [phone] and they will assist you.        Care EveryWhere ID     This is your Care EveryWhere ID. This could be used by other organizations to access your [facility] medical records  [id number]        Equal Access to Services     JEFFERY CHIANG : Omayra Penny, renny aldrich, cierra self, reginald paula. So [facility] [phone].    ATENCIÓN: Si habla español, tiene a cabrales disposición servicios gratuitos de asistencia lingüística. Llame al [phone].    We comply with applicable federal civil rights laws and [place] laws. We do not discriminate on the basis of race, color, national origin, age, disability sex, sexual orientation or gender identity.            After Visit Summary       This is your record. Keep this with you and show to your community pharmacist(s) and doctor(s) at your next visit.

## 2017-07-26 ENCOUNTER — HOSPITAL ENCOUNTER (EMERGENCY)
Facility: CLINIC | Age: 42
Discharge: HOME | End: 2017-07-26
Attending: NURSE PRACTITIONER | Admitting: NURSE PRACTITIONER
Payer: COMMERCIAL

## 2017-07-26 VITALS
WEIGHT: 236 LBS | OXYGEN SATURATION: 100 % | DIASTOLIC BLOOD PRESSURE: 81 MMHG | SYSTOLIC BLOOD PRESSURE: 136 MMHG | TEMPERATURE: 98.1 F | RESPIRATION RATE: 16 BRPM | BODY MASS INDEX: 40.51 KG/M2

## 2017-07-26 DIAGNOSIS — S90.32XA CONTUSION OF LEFT FOOT, INITIAL ENCOUNTER: ICD-10-CM

## 2017-07-26 PROCEDURE — 99212 OFFICE O/P EST SF 10 MIN: CPT

## 2017-07-26 PROCEDURE — 99213 OFFICE O/P EST LOW 20 MIN: CPT | Performed by: NURSE PRACTITIONER

## 2017-07-26 NOTE — ED AVS SNAPSHOT
[facility] Emergency Department    [address] [address]    Phone:  [phone]    Fax:  [phone]                                       Maris Wagner   MRN: [id number]    Department:  [facility] Emergency Department   Date of Visit:  7/26/2017           Patient Information     Date Of Birth          1975        Your diagnoses for this visit were:     Contusion of left foot, initial encounter        You were seen by Leeanne Pinto APRN CNP.      Follow-up Information     Follow up with ortho.    Why:  As needed, If symptoms worsen        Discharge Instructions         Foot Contusion  You have a contusion. This is also called a bruise. There is swelling and some bleeding under the skin, but no broken bones. This injury generally takes a few days to a few weeks to heal.  During that time, the bruise will typically change in color from reddish, to purple-blue, to greenish-yellow, then to yellow-brown.  Home care    Elevate the foot to reduce pain and swelling. As much as possible, sit or lie down with the foot raised about the level of your heart. This is especially important during the first 48 hours.    Ice the foot to help reduce pain and swelling. Wrap a cold source (ice pack or ice cubes in a plastic bag) in a thin towel. Apply to the bruised area for 20 minutes every 1 to 2 hours the first day. Continue this 3 to 4 times a day until the pain and swelling goes away.    Unless another medication was prescribed, you can take acetaminophen, ibuprofen, or naproxen to control pain. (If you have chronic liver or kidney disease or ever had a stomach ulcer or GI bleeding, talk with your doctor before using these medicines.)  Follow up  Follow up with your health care provider or our staff as advised. Call if you are not improving within 1 to 2 weeks.  When to seek medical advice   Call your health care provider right away if you have any of the following:    Increased pain or  swelling    Foot or leg becomes cold, blue, numb or tingly    Signs of infection: Warmth, drainage, or increased redness or pain around the bruise    Inability to move the injured foot     Frequent bruising for unknown reasons  Date Last Reviewed: 4/29/2015 2000-2017 [employer]. [address] [place] [address]. All rights reserved. This information is not intended as a substitute for professional medical care. Always follow your healthcare professional's instructions.          24 Hour Appointment Hotline       To make an appointment at any [facility], call [phone] ([phone]). If you don't have a family doctor or clinic, we will help you find one. [facility] clinics are conveniently located to serve the needs of you and your family.             Review of your medicines      Our records show that you are taking the medicines listed below. If these are incorrect, please call your family doctor or clinic.        Dose / Directions Last dose taken    albuterol 108 (90 BASE) MCG/ACT Inhaler   Commonly known as:  PROAIR HFA/PROVENTIL HFA/VENTOLIN HFA   Dose:  2 puff        Inhale 2 puffs into the lungs every 6 hours   Refills:  0        IBUPROFEN PO   Dose:  600 mg        Take 600 mg by mouth every 6 hours as needed for moderate pain   Refills:  0        OMEPRAZOLE PO   Dose:  40 mg        Take 40 mg by mouth every morning   Refills:  0                Orders Needing Specimen Collection     None      Pending Results     No orders found from 7/24/2017 to 7/27/2017.            Pending Culture Results     No orders found from 7/24/2017 to 7/27/2017.            Pending Results Instructions     If you had any lab results that were not finalized at the time of your Discharge, you can call the ED Lab Result RN at [phone]. You will be contacted by this team for any positive Lab results or changes in treatment. The nurses are available 7 days a week from 10A to 6:30P.  You can leave a  message 24 hours per day and they will return your call.        Test Results From Your Hospital Stay               Thank you for choosing [facility]       Thank you for choosing [facility] for your care. Our goal is always to provide you with excellent care. Hearing back from our patients is one way we can continue to improve our services. Please take a few minutes to complete the written survey that you may receive in the mail after you visit with us. Thank you!        [employer] Information     [employer] gives you secure access to your electronic health record. If you see a primary care provider, you can also send messages to your care team and make appointments. If you have questions, please call your primary care clinic.  If you do not have a primary care provider, please call [phone] and they will assist you.        Care EveryWhere ID     This is your Care EveryWhere ID. This could be used by other organizations to access your [facility] medical records  [id number]        Equal Access to Services     JEFFERY CHIANG : Omayra Penny, renny aldrich, reginald wong . So [facility] [phone].    ATENCIÓN: Si habla español, tiene a cabrales disposición servicios gratuitos de asistencia lingüística. Llame al [phone].    We comply with applicable federal civil rights laws and [place] laws. We do not discriminate on the basis of race, color, national origin, age, disability sex, sexual orientation or gender identity.            After Visit Summary       This is your record. Keep this with you and show to your community pharmacist(s) and doctor(s) at your next visit.

## 2017-07-26 NOTE — ED PROVIDER NOTES
History     Chief Complaint   Patient presents with     Ankle Pain     rolled ankle 3-4 days ago, still having discomfort wants a recheck to make sure      HPI  Maris Wagner is a 42 year old female who presents with left ankle pain.  Pt reports she rolled her ankle a few days and came in a few days ago and was advised xrays were negative.  Pt concerned because there is still swelling and there is bruising now present.  Pt reports hx of neuropathy and having surgery on right ankle next week.  Pt states she is concerned about how long it will take to heal.  She would like something to prevent it from rolling again.    I have reviewed the Medications, Allergies, Past Medical and Surgical History, and Social History in the Epic system.    Review of Systems  10 point ROS of systems including Constitutional, Eyes, Respiratory, Cardiovascular, Gastroenterology, Genitourinary, Integumentary, Muscularskeletal, Psychiatric were all negative except for pertinent positives noted in my HPI.    Physical Exam   BP: 136/81  Heart Rate: 95  Temp: 98.1  F (36.7  C)  Resp: 16  Weight: 107 kg (236 lb)  SpO2: 100 %  Physical Exam   Constitutional: She appears well-developed. No distress.   Cardiovascular: Normal rate, regular rhythm and normal heart sounds.    Pulmonary/Chest: Effort normal. No respiratory distress.   Musculoskeletal:        Left foot: There is tenderness and swelling. There is normal range of motion (noted over midfoot and metatarsal range 3-5), no bony tenderness (mild swelling over dorsum of foot), normal capillary refill, no crepitus, no deformity and no laceration.   Skin: She is not diaphoretic.   Nursing note and vitals reviewed.      ED Course     ED Course     Procedures    Labs Ordered and Resulted from Time of ED Arrival Up to the Time of Departure from the ED - No data to display  Xray noted to free from fracture this past Sunday and therefore no need to repeat.    Assessments & Plan (with Medical  Decision Making)     I have reviewed the nursing notes.    I have reviewed the findings, diagnosis, plan and need for follow up with the patient.  Maris Wagner is a 42 year old female who presents with left ankle pain.  Pt reports she rolled her ankle a few days and came in a few days ago and was advised xrays were negative.  Pt concerned because there is still swelling and there is bruising now present.  Pt reports hx of neuropathy and having surgery on right ankle next week.  Pt states she is concerned about how long it will take to heal.  She would like something to prevent it from rolling again.  Exam reveals mild swelling, tenderness over midfoot, metatarsals 3-5 without crepitus.  No need to repeat xrays.  Discussed care with patient and offered an ace wrap for support and to help with swelling.  Pt recently has had back surgery and reports unable to exercise currently due to surgery and bilateral foot problems.  Discussed possible stationary bike exercises and check with back surgeon about this.  Encouraged patient to wear better support shoes than flip flops to prevent ankle rolling and she reports she cannot due to her toe problem.  Discussed ace wrap and ankle exercises.  Encourage follow up with orthopedics.  DDX ankle sprain, metatarsal fracture    Discharge Medication List as of 7/26/2017  2:25 PM          Final diagnoses:   Contusion of left foot, initial encounter       7/26/2017   [facility] EMERGENCY DEPARTMENT     Leeanne Pinto, CORINA CNP  07/26/17 7639

## 2017-07-26 NOTE — ED AVS SNAPSHOT
[facility] Emergency Department    [address] [address]    Phone:  [phone]    Fax:  [phone]                                       Maris Wagner   MRN: [id number]    Department:  [facility] Emergency Department   Date of Visit:  7/26/2017           After Visit Summary Signature Page     I have received my discharge instructions, and my questions have been answered. I have discussed any challenges I see with this plan with the nurse or doctor.    ..........................................................................................................................................  Patient/Patient Representative Signature      ..........................................................................................................................................  Patient Representative Print Name and Relationship to Patient    ..................................................               ................................................  Date                                            Time    ..........................................................................................................................................  Reviewed by Signature/Title    ...................................................              ..............................................  Date                                                            Time

## 2017-07-26 NOTE — DISCHARGE INSTRUCTIONS
Foot Contusion  You have a contusion. This is also called a bruise. There is swelling and some bleeding under the skin, but no broken bones. This injury generally takes a few days to a few weeks to heal.  During that time, the bruise will typically change in color from reddish, to purple-blue, to greenish-yellow, then to yellow-brown.  Home care    Elevate the foot to reduce pain and swelling. As much as possible, sit or lie down with the foot raised about the level of your heart. This is especially important during the first 48 hours.    Ice the foot to help reduce pain and swelling. Wrap a cold source (ice pack or ice cubes in a plastic bag) in a thin towel. Apply to the bruised area for 20 minutes every 1 to 2 hours the first day. Continue this 3 to 4 times a day until the pain and swelling goes away.    Unless another medication was prescribed, you can take acetaminophen, ibuprofen, or naproxen to control pain. (If you have chronic liver or kidney disease or ever had a stomach ulcer or GI bleeding, talk with your doctor before using these medicines.)  Follow up  Follow up with your health care provider or our staff as advised. Call if you are not improving within 1 to 2 weeks.  When to seek medical advice   Call your health care provider right away if you have any of the following:    Increased pain or swelling    Foot or leg becomes cold, blue, numb or tingly    Signs of infection: Warmth, drainage, or increased redness or pain around the bruise    Inability to move the injured foot     Frequent bruising for unknown reasons  Date Last Reviewed: 4/29/2015 2000-2017 [employer] [address] [place] [address], [place] [address]. All rights reserved. This information is not intended as a substitute for professional medical care. Always follow your healthcare professional's instructions.

## 2017-08-01 ENCOUNTER — ANESTHESIA EVENT (OUTPATIENT)
Dept: SURGERY | Facility: CLINIC | Age: 42
End: 2017-08-01
Payer: COMMERCIAL

## 2017-08-02 NOTE — ANESTHESIA PREPROCEDURE EVALUATION
Anesthesia Evaluation     . Pt has had prior anesthetic. Type: General and MAC    No history of anesthetic complications          ROS/MED HX    ENT/Pulmonary:     (+)allergic rhinitis, tobacco use (albuterol inh PRN), Current use 1 packs/day  , . .    Neurologic:     (+)neuropathy - L leg from knee down to toes from prev back surg,     Cardiovascular:  - neg cardiovascular ROS       METS/Exercise Tolerance:     Hematologic:     (+) Anemia, -      Musculoskeletal: Comment: S/p L3-4 and L5-S1 discectomy  (+) , , other musculoskeletal- LBP      GI/Hepatic:     (+) GERD Asymptomatic on medication, Other GI/Hepatic peptic ulcer history       Renal/Genitourinary:  - ROS Renal section negative       Endo:     (+) Obesity, .      Psychiatric:  - neg psychiatric ROS       Infectious Disease:  - neg infectious disease ROS       Malignancy:      - no malignancy   Other:    (+) H/O Chronic Pain,                   Physical Exam  Normal systems: cardiovascular and pulmonary    Airway   Mallampati: II  TM distance: >3 FB  Neck ROM: full    Dental   (+) missing    Cardiovascular       Pulmonary                     Anesthesia Plan      History & Physical Review  History and physical reviewed and following examination; no interval change.    ASA Status:  2 .    NPO Status:  > 8 hours    Plan for MAC, Periph. Nerve Block for postop pain, General, LMA and ETT Reason for MAC:  Deep or markedly invasive procedure (G8)  PONV prophylaxis:  Ondansetron (or other 5HT-3) and Dexamethasone or Solumedrol  Pt unable to urinate. Pt has had tubal ligation and refuses to do preg test.      Postoperative Care  Postoperative pain management:  IV analgesics, Oral pain medications and Peripheral nerve block (Single Shot).      Consents  Anesthetic plan, risks, benefits and alternatives discussed with:  Patient..                          .

## 2017-08-03 ENCOUNTER — APPOINTMENT (OUTPATIENT)
Dept: GENERAL RADIOLOGY | Facility: CLINIC | Age: 42
End: 2017-08-03
Attending: PODIATRIST
Payer: COMMERCIAL

## 2017-08-03 ENCOUNTER — HOSPITAL ENCOUNTER (OUTPATIENT)
Facility: CLINIC | Age: 42
Discharge: HOME | End: 2017-08-03
Attending: PODIATRIST | Admitting: PODIATRIST
Payer: COMMERCIAL

## 2017-08-03 ENCOUNTER — ANESTHESIA (OUTPATIENT)
Dept: SURGERY | Facility: CLINIC | Age: 42
End: 2017-08-03
Payer: COMMERCIAL

## 2017-08-03 VITALS
WEIGHT: 238 LBS | SYSTOLIC BLOOD PRESSURE: 101 MMHG | RESPIRATION RATE: 16 BRPM | TEMPERATURE: 98.2 F | HEIGHT: 65 IN | HEART RATE: 65 BPM | OXYGEN SATURATION: 98 % | BODY MASS INDEX: 39.65 KG/M2 | DIASTOLIC BLOOD PRESSURE: 67 MMHG

## 2017-08-03 DIAGNOSIS — G89.18 POSTOPERATIVE PAIN: Primary | ICD-10-CM

## 2017-08-03 DIAGNOSIS — Z91.89 AT RISK FOR DEEP VENOUS THROMBOSIS: ICD-10-CM

## 2017-08-03 PROCEDURE — 25000132 ZZH RX MED GY IP 250 OP 250 PS 637: Performed by: PODIATRIST

## 2017-08-03 PROCEDURE — 27210995 ZZH RX 272: Performed by: PODIATRIST

## 2017-08-03 PROCEDURE — 37000008 ZZH ANESTHESIA TECHNICAL FEE, 1ST 30 MIN: Performed by: PODIATRIST

## 2017-08-03 PROCEDURE — 27210794 ZZH OR GENERAL SUPPLY STERILE: Performed by: PODIATRIST

## 2017-08-03 PROCEDURE — 71000027 ZZH RECOVERY PHASE 2 EACH 15 MINS: Performed by: PODIATRIST

## 2017-08-03 PROCEDURE — 36000065 ZZH SURGERY LEVEL 4 W FLUORO 1ST 30 MIN: Performed by: PODIATRIST

## 2017-08-03 PROCEDURE — 25000132 ZZH RX MED GY IP 250 OP 250 PS 637: Performed by: NURSE ANESTHETIST, CERTIFIED REGISTERED

## 2017-08-03 PROCEDURE — 25000125 ZZHC RX 250: Performed by: NURSE ANESTHETIST, CERTIFIED REGISTERED

## 2017-08-03 PROCEDURE — C1713 ANCHOR/SCREW BN/BN,TIS/BN: HCPCS | Performed by: PODIATRIST

## 2017-08-03 PROCEDURE — 25000128 H RX IP 250 OP 636: Performed by: NURSE ANESTHETIST, CERTIFIED REGISTERED

## 2017-08-03 PROCEDURE — S0020 INJECTION, BUPIVICAINE HYDRO: HCPCS | Performed by: PODIATRIST

## 2017-08-03 PROCEDURE — 36000063 ZZH SURGERY LEVEL 4 EA 15 ADDTL MIN: Performed by: PODIATRIST

## 2017-08-03 PROCEDURE — 25000128 H RX IP 250 OP 636: Performed by: PODIATRIST

## 2017-08-03 PROCEDURE — 40000305 ZZH STATISTIC PRE PROC ASSESS I: Performed by: PODIATRIST

## 2017-08-03 PROCEDURE — 71000012 ZZH RECOVERY PHASE 1 LEVEL 1 FIRST HR: Performed by: PODIATRIST

## 2017-08-03 PROCEDURE — 25000125 ZZHC RX 250: Performed by: PODIATRIST

## 2017-08-03 PROCEDURE — 37000009 ZZH ANESTHESIA TECHNICAL FEE, EACH ADDTL 15 MIN: Performed by: PODIATRIST

## 2017-08-03 PROCEDURE — 40000277 XR SURGERY CARM FLUORO LESS THAN 5 MIN W STILLS: Mod: TC

## 2017-08-03 DEVICE — IMP SCR ARTHREX LP CANC 4.0X16MM SS AR-8840-16: Type: IMPLANTABLE DEVICE | Site: ANKLE | Status: FUNCTIONAL

## 2017-08-03 DEVICE — IMPLANTABLE DEVICE: Type: IMPLANTABLE DEVICE | Site: ANKLE | Status: FUNCTIONAL

## 2017-08-03 DEVICE — IMP SCR ARTHREX LP LOCKING 3.5X12MM SS AR-8835L-12: Type: IMPLANTABLE DEVICE | Site: ANKLE | Status: FUNCTIONAL

## 2017-08-03 DEVICE — IMP SCR ARTHREX LP CANC 4.0X18MM SS AR-8840-18: Type: IMPLANTABLE DEVICE | Site: ANKLE | Status: FUNCTIONAL

## 2017-08-03 DEVICE — IMP KIT SYNDEMOSIS ARTHREX TIGHTROPE KNOTLESS SS AR-8926SS: Type: IMPLANTABLE DEVICE | Site: ANKLE | Status: FUNCTIONAL

## 2017-08-03 DEVICE — KIT ANCHOR ARTHREX SM JOINT BIOCOMP SUTURETAK AR-8934DSC: Type: IMPLANTABLE DEVICE | Site: ANKLE | Status: FUNCTIONAL

## 2017-08-03 DEVICE — IMP ANCHOR ARTHREX BIO-SUTURE TAK 3X14MM W/FW AR-8934BCNF: Type: IMPLANTABLE DEVICE | Site: ANKLE | Status: FUNCTIONAL

## 2017-08-03 DEVICE — IMP PLATE ARTHREX LOCKING 1/3 TUBULAR 7H SS AR-8943T-07: Type: IMPLANTABLE DEVICE | Site: ANKLE | Status: FUNCTIONAL

## 2017-08-03 DEVICE — IMP SCR ARTHREX LP LOCKING 3.5X14MM SS AR-8835L-14: Type: IMPLANTABLE DEVICE | Site: ANKLE | Status: FUNCTIONAL

## 2017-08-03 RX ORDER — FENTANYL CITRATE 50 UG/ML
INJECTION, SOLUTION INTRAMUSCULAR; INTRAVENOUS PRN
Status: DISCONTINUED | OUTPATIENT
Start: 2017-08-03 | End: 2017-08-03

## 2017-08-03 RX ORDER — BUPIVACAINE HYDROCHLORIDE 5 MG/ML
INJECTION, SOLUTION PERINEURAL PRN
Status: DISCONTINUED | OUTPATIENT
Start: 2017-08-03 | End: 2017-08-03 | Stop reason: HOSPADM

## 2017-08-03 RX ORDER — ACETAMINOPHEN 325 MG/1
975 TABLET ORAL ONCE
Status: COMPLETED | OUTPATIENT
Start: 2017-08-03 | End: 2017-08-03

## 2017-08-03 RX ORDER — CEFAZOLIN SODIUM 1 G/3ML
1 INJECTION, POWDER, FOR SOLUTION INTRAMUSCULAR; INTRAVENOUS SEE ADMIN INSTRUCTIONS
Status: DISCONTINUED | OUTPATIENT
Start: 2017-08-03 | End: 2017-08-03 | Stop reason: HOSPADM

## 2017-08-03 RX ORDER — ONDANSETRON 2 MG/ML
INJECTION INTRAMUSCULAR; INTRAVENOUS PRN
Status: DISCONTINUED | OUTPATIENT
Start: 2017-08-03 | End: 2017-08-03

## 2017-08-03 RX ORDER — DEXAMETHASONE SODIUM PHOSPHATE 4 MG/ML
INJECTION, SOLUTION INTRA-ARTICULAR; INTRALESIONAL; INTRAMUSCULAR; INTRAVENOUS; SOFT TISSUE PRN
Status: DISCONTINUED | OUTPATIENT
Start: 2017-08-03 | End: 2017-08-03

## 2017-08-03 RX ORDER — SODIUM CHLORIDE, SODIUM LACTATE, POTASSIUM CHLORIDE, CALCIUM CHLORIDE 600; 310; 30; 20 MG/100ML; MG/100ML; MG/100ML; MG/100ML
INJECTION, SOLUTION INTRAVENOUS CONTINUOUS
Status: DISCONTINUED | OUTPATIENT
Start: 2017-08-03 | End: 2017-08-03 | Stop reason: HOSPADM

## 2017-08-03 RX ORDER — MEPERIDINE HYDROCHLORIDE 25 MG/ML
12.5 INJECTION INTRAMUSCULAR; INTRAVENOUS; SUBCUTANEOUS
Status: DISCONTINUED | OUTPATIENT
Start: 2017-08-03 | End: 2017-08-03 | Stop reason: HOSPADM

## 2017-08-03 RX ORDER — ONDANSETRON 4 MG/1
4 TABLET, ORALLY DISINTEGRATING ORAL EVERY 30 MIN PRN
Status: DISCONTINUED | OUTPATIENT
Start: 2017-08-03 | End: 2017-08-03 | Stop reason: HOSPADM

## 2017-08-03 RX ORDER — LABETALOL HYDROCHLORIDE 5 MG/ML
INJECTION, SOLUTION INTRAVENOUS PRN
Status: DISCONTINUED | OUTPATIENT
Start: 2017-08-03 | End: 2017-08-03

## 2017-08-03 RX ORDER — ROPIVACAINE HYDROCHLORIDE 7.5 MG/ML
INJECTION, SOLUTION EPIDURAL; PERINEURAL PRN
Status: DISCONTINUED | OUTPATIENT
Start: 2017-08-03 | End: 2017-08-03

## 2017-08-03 RX ORDER — HYDROMORPHONE HYDROCHLORIDE 1 MG/ML
.3-.5 INJECTION, SOLUTION INTRAMUSCULAR; INTRAVENOUS; SUBCUTANEOUS EVERY 10 MIN PRN
Status: DISCONTINUED | OUTPATIENT
Start: 2017-08-03 | End: 2017-08-03 | Stop reason: HOSPADM

## 2017-08-03 RX ORDER — LIDOCAINE HCL/EPINEPHRINE/PF 2%-1:200K
VIAL (ML) INJECTION PRN
Status: DISCONTINUED | OUTPATIENT
Start: 2017-08-03 | End: 2017-08-03

## 2017-08-03 RX ORDER — OXYCODONE AND ACETAMINOPHEN 7.5; 325 MG/1; MG/1
2 TABLET ORAL EVERY 4 HOURS PRN
Qty: 44 TABLET | Refills: 0 | Status: SHIPPED | OUTPATIENT
Start: 2017-08-03 | End: 2017-10-10

## 2017-08-03 RX ORDER — HYDROXYZINE HYDROCHLORIDE 25 MG/1
25 TABLET, FILM COATED ORAL EVERY 4 HOURS PRN
Qty: 44 TABLET | Refills: 0 | Status: SHIPPED | OUTPATIENT
Start: 2017-08-03 | End: 2017-10-10

## 2017-08-03 RX ORDER — HYDRALAZINE HYDROCHLORIDE 20 MG/ML
INJECTION INTRAMUSCULAR; INTRAVENOUS PRN
Status: DISCONTINUED | OUTPATIENT
Start: 2017-08-03 | End: 2017-08-03

## 2017-08-03 RX ORDER — LIDOCAINE 40 MG/G
CREAM TOPICAL
Status: DISCONTINUED | OUTPATIENT
Start: 2017-08-03 | End: 2017-08-03 | Stop reason: HOSPADM

## 2017-08-03 RX ORDER — MAGNESIUM HYDROXIDE 1200 MG/15ML
LIQUID ORAL PRN
Status: DISCONTINUED | OUTPATIENT
Start: 2017-08-03 | End: 2017-08-03 | Stop reason: HOSPADM

## 2017-08-03 RX ORDER — ONDANSETRON 2 MG/ML
4 INJECTION INTRAMUSCULAR; INTRAVENOUS EVERY 30 MIN PRN
Status: DISCONTINUED | OUTPATIENT
Start: 2017-08-03 | End: 2017-08-03 | Stop reason: HOSPADM

## 2017-08-03 RX ORDER — NALOXONE HYDROCHLORIDE 0.4 MG/ML
.1-.4 INJECTION, SOLUTION INTRAMUSCULAR; INTRAVENOUS; SUBCUTANEOUS
Status: DISCONTINUED | OUTPATIENT
Start: 2017-08-03 | End: 2017-08-03 | Stop reason: HOSPADM

## 2017-08-03 RX ORDER — CEFAZOLIN SODIUM 2 G/100ML
2 INJECTION, SOLUTION INTRAVENOUS
Status: COMPLETED | OUTPATIENT
Start: 2017-08-03 | End: 2017-08-03

## 2017-08-03 RX ORDER — LIDOCAINE HYDROCHLORIDE 10 MG/ML
INJECTION, SOLUTION INFILTRATION; PERINEURAL PRN
Status: DISCONTINUED | OUTPATIENT
Start: 2017-08-03 | End: 2017-08-03

## 2017-08-03 RX ORDER — PROPOFOL 10 MG/ML
INJECTION, EMULSION INTRAVENOUS CONTINUOUS PRN
Status: DISCONTINUED | OUTPATIENT
Start: 2017-08-03 | End: 2017-08-03

## 2017-08-03 RX ORDER — FENTANYL CITRATE 50 UG/ML
25-50 INJECTION, SOLUTION INTRAMUSCULAR; INTRAVENOUS
Status: DISCONTINUED | OUTPATIENT
Start: 2017-08-03 | End: 2017-08-03 | Stop reason: HOSPADM

## 2017-08-03 RX ORDER — OXYCODONE AND ACETAMINOPHEN 5; 325 MG/1; MG/1
1 TABLET ORAL EVERY 4 HOURS PRN
Status: DISCONTINUED | OUTPATIENT
Start: 2017-08-03 | End: 2017-08-03 | Stop reason: HOSPADM

## 2017-08-03 RX ORDER — LIDOCAINE HYDROCHLORIDE 10 MG/ML
INJECTION, SOLUTION EPIDURAL; INFILTRATION; INTRACAUDAL; PERINEURAL PRN
Status: DISCONTINUED | OUTPATIENT
Start: 2017-08-03 | End: 2017-08-03

## 2017-08-03 RX ADMIN — ACETAMINOPHEN 975 MG: 325 TABLET, FILM COATED ORAL at 11:24

## 2017-08-03 RX ADMIN — SODIUM CHLORIDE, POTASSIUM CHLORIDE, SODIUM LACTATE AND CALCIUM CHLORIDE: 600; 310; 30; 20 INJECTION, SOLUTION INTRAVENOUS at 11:08

## 2017-08-03 RX ADMIN — FENTANYL CITRATE 50 MCG: 50 INJECTION INTRAMUSCULAR; INTRAVENOUS at 15:06

## 2017-08-03 RX ADMIN — FENTANYL CITRATE 100 MCG: 50 INJECTION, SOLUTION INTRAMUSCULAR; INTRAVENOUS at 14:36

## 2017-08-03 RX ADMIN — SODIUM CHLORIDE, POTASSIUM CHLORIDE, SODIUM LACTATE AND CALCIUM CHLORIDE: 600; 310; 30; 20 INJECTION, SOLUTION INTRAVENOUS at 14:36

## 2017-08-03 RX ADMIN — LABETALOL HYDROCHLORIDE 10 MG: 5 INJECTION, SOLUTION INTRAVENOUS at 13:54

## 2017-08-03 RX ADMIN — LIDOCAINE HYDROCHLORIDE,EPINEPHRINE BITARTRATE 5 ML: 20; .005 INJECTION, SOLUTION EPIDURAL; INFILTRATION; INTRACAUDAL; PERINEURAL at 12:02

## 2017-08-03 RX ADMIN — LABETALOL HYDROCHLORIDE 10 MG: 5 INJECTION, SOLUTION INTRAVENOUS at 13:49

## 2017-08-03 RX ADMIN — LIDOCAINE HYDROCHLORIDE 2 ML: 10 INJECTION, SOLUTION EPIDURAL; INFILTRATION; INTRACAUDAL; PERINEURAL at 11:56

## 2017-08-03 RX ADMIN — LIDOCAINE HYDROCHLORIDE 2 ML: 10 INJECTION, SOLUTION EPIDURAL; INFILTRATION; INTRACAUDAL; PERINEURAL at 12:06

## 2017-08-03 RX ADMIN — ROPIVACAINE HYDROCHLORIDE 25 ML: 7.5 INJECTION, SOLUTION EPIDURAL; PERINEURAL at 12:03

## 2017-08-03 RX ADMIN — FENTANYL CITRATE 25 MCG: 50 INJECTION, SOLUTION INTRAMUSCULAR; INTRAVENOUS at 13:22

## 2017-08-03 RX ADMIN — MIDAZOLAM HYDROCHLORIDE 1 MG: 1 INJECTION, SOLUTION INTRAMUSCULAR; INTRAVENOUS at 11:56

## 2017-08-03 RX ADMIN — CEFAZOLIN SODIUM 2 G: 2 INJECTION, SOLUTION INTRAVENOUS at 12:49

## 2017-08-03 RX ADMIN — LABETALOL HYDROCHLORIDE 10 MG: 5 INJECTION, SOLUTION INTRAVENOUS at 14:01

## 2017-08-03 RX ADMIN — HYDROMORPHONE HYDROCHLORIDE 0.5 MG: 1 INJECTION, SOLUTION INTRAMUSCULAR; INTRAVENOUS; SUBCUTANEOUS at 13:42

## 2017-08-03 RX ADMIN — FENTANYL CITRATE 25 MCG: 50 INJECTION, SOLUTION INTRAMUSCULAR; INTRAVENOUS at 13:06

## 2017-08-03 RX ADMIN — OXYCODONE HYDROCHLORIDE AND ACETAMINOPHEN 1 TABLET: 5; 325 TABLET ORAL at 15:23

## 2017-08-03 RX ADMIN — FENTANYL CITRATE 50 MCG: 50 INJECTION, SOLUTION INTRAMUSCULAR; INTRAVENOUS at 11:56

## 2017-08-03 RX ADMIN — FENTANYL CITRATE 25 MCG: 50 INJECTION, SOLUTION INTRAMUSCULAR; INTRAVENOUS at 13:15

## 2017-08-03 RX ADMIN — LABETALOL HYDROCHLORIDE 10 MG: 5 INJECTION, SOLUTION INTRAVENOUS at 14:05

## 2017-08-03 RX ADMIN — HYDRALAZINE HYDROCHLORIDE 10 MG: 20 INJECTION INTRAMUSCULAR; INTRAVENOUS at 14:06

## 2017-08-03 RX ADMIN — LIDOCAINE HYDROCHLORIDE 1 ML: 10 INJECTION, SOLUTION EPIDURAL; INFILTRATION; INTRACAUDAL; PERINEURAL at 11:09

## 2017-08-03 RX ADMIN — MIDAZOLAM HYDROCHLORIDE 2 MG: 1 INJECTION, SOLUTION INTRAMUSCULAR; INTRAVENOUS at 12:52

## 2017-08-03 RX ADMIN — FENTANYL CITRATE 100 MCG: 50 INJECTION, SOLUTION INTRAMUSCULAR; INTRAVENOUS at 11:52

## 2017-08-03 RX ADMIN — LIDOCAINE HYDROCHLORIDE 50 MG: 10 INJECTION, SOLUTION INFILTRATION; PERINEURAL at 12:53

## 2017-08-03 RX ADMIN — ROPIVACAINE HYDROCHLORIDE 10 ML: 7.5 INJECTION, SOLUTION EPIDURAL; PERINEURAL at 12:08

## 2017-08-03 RX ADMIN — FENTANYL CITRATE 50 MCG: 50 INJECTION INTRAMUSCULAR; INTRAVENOUS at 15:14

## 2017-08-03 RX ADMIN — HYDROMORPHONE HYDROCHLORIDE 0.5 MG: 1 INJECTION, SOLUTION INTRAMUSCULAR; INTRAVENOUS; SUBCUTANEOUS at 13:50

## 2017-08-03 RX ADMIN — ONDANSETRON 4 MG: 2 INJECTION INTRAMUSCULAR; INTRAVENOUS at 13:55

## 2017-08-03 RX ADMIN — FENTANYL CITRATE 25 MCG: 50 INJECTION, SOLUTION INTRAMUSCULAR; INTRAVENOUS at 13:33

## 2017-08-03 RX ADMIN — PROPOFOL 150 MCG/KG/MIN: 10 INJECTION, EMULSION INTRAVENOUS at 12:54

## 2017-08-03 RX ADMIN — MIDAZOLAM HYDROCHLORIDE 2 MG: 1 INJECTION, SOLUTION INTRAMUSCULAR; INTRAVENOUS at 11:52

## 2017-08-03 RX ADMIN — DEXAMETHASONE SODIUM PHOSPHATE 4 MG: 4 INJECTION, SOLUTION INTRA-ARTICULAR; INTRALESIONAL; INTRAMUSCULAR; INTRAVENOUS; SOFT TISSUE at 13:04

## 2017-08-03 ASSESSMENT — LIFESTYLE VARIABLES: TOBACCO_USE: 1

## 2017-08-03 NOTE — DISCHARGE INSTRUCTIONS
Same Day Surgery Discharge Instructions  Special Precautions After Surgery - Adult    1. It is not unusual to feel lightheaded or faint, up to 24 hours after surgery or while taking pain medication.  If you have these symptoms; sit for a few minutes before standing and have someone assist you when getting up.  2. You should rest and relax for the next 24 hours and must have someone stay with you for at least 24 hours after your discharge.  3. DO NOT DRIVE any vehicle or operate mechanical equipment for 24 hours following the end of your surgery.  DO NOT DRIVE while taking narcotic pain medications that have been prescribed by your physician.  If you had a limb operated on, you must be able to use it fully to drive.  4. DO NOT drink alcoholic beverages for 24 hours following surgery or while taking prescription pain medication.  5. Drink clear liquids (apple juice, ginger ale, broth, 7-Up, etc.).  Progress to your regular diet as you feel able.  6. Any questions call your physician and do not make important decisions for 24 hours.      [facility]:  [phone]       Same Day Surgery [phone], Monday thru Friday 6am-9pm.

## 2017-08-03 NOTE — ANESTHESIA CARE TRANSFER NOTE
Patient: Maris Wagner    Procedure(s):  Right Ankle Arthroscopic Evaluation and Debridement,Lateral Ligament Repair,Fracture Evaluation, Open Reduction Internal Fixation  - Wound Class: I-Clean   - Wound Class: I-Clean    Diagnosis: right ankle fracture,impingement,instability  Diagnosis Additional Information: No value filed.    Anesthesia Type:   MAC, Periph. Nerve Block for postop pain, General, LMA, ETT     Note:  Airway :Face Mask  Patient transferred to:PACU        Vitals: (Last set prior to Anesthesia Care Transfer)    CRNA VITALS  8/3/2017 1404 - 8/3/2017 1442      8/3/2017             Pulse: 68    SpO2: 97 %                Electronically Signed By: CORINA Tariq CRNA  August 3, 2017  2:42 PM

## 2017-08-03 NOTE — OR NURSING
"Alert. States she has pain on the \"inner part of my foot.\" Denies nausea. Sitting up, eating crackers and drinking water.  "

## 2017-08-03 NOTE — ANESTHESIA POSTPROCEDURE EVALUATION
Patient: Maris Wagner    Procedure(s):  Right Ankle Arthroscopic Evaluation and Debridement,Lateral Ligament Repair,Fracture Evaluation, Open Reduction Internal Fixation  - Wound Class: I-Clean   - Wound Class: I-Clean    Diagnosis:right ankle fracture,impingement,instability  Diagnosis Additional Information: No value filed.    Anesthesia Type:  MAC, Periph. Nerve Block for postop pain, General, LMA, ETT    Note:  Anesthesia Post Evaluation    Patient location during evaluation: Bedside  Patient participation: Able to participate in evaluation but full recovery from regional anesthesia has not yet ocurrred but is anticipated to occur within 48 hours  Level of consciousness: awake and alert  Pain management: adequate  Airway patency: patent  Cardiovascular status: acceptable  Respiratory status: acceptable  Hydration status: acceptable  PONV: none     Anesthetic complications: None          Last vitals:  Vitals:    08/03/17 1545 08/03/17 1600 08/03/17 1615   BP: 104/70 100/68 101/67   Pulse:      Resp: 16 16 16   Temp:      SpO2: 96% 97% 98%         Electronically Signed By: CORINA Bautista CRNA  August 3, 2017  6:50 PM

## 2017-08-03 NOTE — IP AVS SNAPSHOT
[facility] PreOP/Phase II    [address] [address]    Phone:  [phone]    Fax:  [phone]                                       After Visit Summary   8/3/2017    Maris Wagner    MRN: [id number]           After Visit Summary Signature Page     I have received my discharge instructions, and my questions have been answered. I have discussed any challenges I see with this plan with the nurse or doctor.    ..........................................................................................................................................  Patient/Patient Representative Signature      ..........................................................................................................................................  Patient Representative Print Name and Relationship to Patient    ..................................................               ................................................  Date                                            Time    ..........................................................................................................................................  Reviewed by Signature/Title    ...................................................              ..............................................  Date                                                            Time

## 2017-08-03 NOTE — BRIEF OP NOTE
[facility] OR   Brief Operative Note    Pre-operative diagnosis: right ankle fracture,impingement,instability   Post-operative diagnosis * No post-op diagnosis entered *   Procedure: Procedure(s):  Right Ankle Arthroscopic Evaluation and Debridement,Lateral Ligament Repair,Fracture Evaluation,Possible Open Reduction Internal Fixation  - Wound Class: I-Clean   - Wound Class: I-Clean   Surgeon: Clint Simon DPM   Anesthesia: Combined General with Popliteal Block    Estimated blood loss: Less than 10 ml   Blood transfusion: No transfusion was given during surgery   Drains: None   Specimens: None   Findings: Ankle with + impingement tissues post trauma - scarring + synovial banding.  Unstable syndesmosis as viewable arthroscopically - open book type.  + Non-union of the fibular distal margin.  Anterior ankle ligamentous tissue torn off of fibular anterior margins involving the ATF ligament tissues.   Complications: None   Condition: Stable   Comments: See dictated operative report for full details.           Clint Simon DPM, FACFAS  Foot & Ankle Surgeon/Specialist  [facility]

## 2017-08-03 NOTE — IP AVS SNAPSHOT
MRN:[id number]                      After Visit Summary   8/3/2017    Maris Wagner    MRN: [id number]           Thank you!     Thank you for choosing [facility] for your care. Our goal is always to provide you with excellent care. Hearing back from our patients is one way we can continue to improve our services. Please take a few minutes to complete the written survey that you may receive in the mail after you visit with us. Thank you!        Patient Information     Date Of Birth          1975        About your hospital stay     You were admitted on:  August 3, 2017 You last received care in the:  [facility] PreOP/Phase II    You were discharged on:  August 3, 2017       Who to Call     For medical emergencies, please call 911.  For non-urgent questions about your medical care, please call your primary care provider or clinic, [phone]  For questions related to your surgery, please call your surgery clinic        Attending Provider     Provider Clint Reinoso DPM Podiatry       Primary Care Provider Office Phone # Fax #    Zay Madrid [phone]      After Care Instructions     Discharge Instructions       Review discharge instructions as directed by Provider.            Discharge Instructions       Patient to be seen in next 10-14 days.    Please call [facility] at [phone] to make/confirm appointment.            Elevate affected extremity           Ice to affected area       Ice pack to affected area PRN (as needed).            No dressing change       until follow up clinic appointment.            No driving or operating machinery       until the day after procedure            No weight bearing                 Further instructions from your care team                         Same Day Surgery Discharge Instructions  Special Precautions After Surgery - Adult    1. It is not unusual to feel lightheaded or faint, up to 24 hours after  "surgery or while taking pain medication.  If you have these symptoms; sit for a few minutes before standing and have someone assist you when getting up.  2. You should rest and relax for the next 24 hours and must have someone stay with you for at least 24 hours after your discharge.  3. DO NOT DRIVE any vehicle or operate mechanical equipment for 24 hours following the end of your surgery.  DO NOT DRIVE while taking narcotic pain medications that have been prescribed by your physician.  If you had a limb operated on, you must be able to use it fully to drive.  4. DO NOT drink alcoholic beverages for 24 hours following surgery or while taking prescription pain medication.  5. Drink clear liquids (apple juice, ginger ale, broth, 7-Up, etc.).  Progress to your regular diet as you feel able.  6. Any questions call your physician and do not make important decisions for 24 hours.      [facility]:  [phone]       Same Day Surgery [phone], Monday thru Friday 6am-9pm.        Pending Results     No orders found from 8/1/2017 to 8/4/2017.            Admission Information     Date & Time Provider Department Dept. Phone    8/3/2017 Clint Simon, [facility] PreOP/Phase [phone]      Your Vitals Were     Blood Pressure Pulse Temperature Respirations Height Weight    104/70 65 98.2  F (36.8  C) (Oral) 16 1.651 m (5' 5\") 108 kg (238 lb)    Last Period Pulse Oximetry BMI (Body Mass Index)             07/31/2017 96% 39.61 kg/m2         M[employer]dot Information     [employer] gives you secure access to your electronic health record. If you see a primary care provider, you can also send messages to your care team and make appointments. If you have questions, please call your primary care clinic.  If you do not have a primary care provider, please call [phone] and they will assist you.        Care EveryWhere ID     This is your Care EveryWhere ID. This could be used by other organizations to " access your [place] medical records  [id number]        Equal Access to Services     JEFFERY CHIANG : Hadii guerita Penny, renny aldrich, quinjoni noriegamamichael self, reginald gutierrezdiegoglen paula. So [facility] [phone].    ATENCIÓN: Si habla español, tiene a cabrales disposición servicios gratuitos de asistencia lingüística. Llame al [phone].    We comply with applicable federal civil rights laws and [place] laws. We do not discriminate on the basis of race, color, national origin, age, disability sex, sexual orientation or gender identity.               Review of your medicines      START taking        Dose / Directions    aspirin  MG EC tablet   Used for:  At risk for deep venous thrombosis   Notes to Patient:  Start tomorrow.        Dose:  325 mg   Start taking on:  8/4/2017   Take 1 tablet (325 mg) by mouth daily   Quantity:  20 tablet   Refills:  3       hydrOXYzine 25 MG tablet   Commonly known as:  ATARAX   Used for:  Postoperative pain   Notes to Patient:  Start when needed. This medication will make your pain pill stronger.        Dose:  25 mg   Take 1 tablet (25 mg) by mouth every 4 hours as needed for itching (and nausea & pain)   Quantity:  44 tablet   Refills:  0       oxyCODONE-acetaminophen 7.5-325 MG per tablet   Commonly known as:  PERCOCET   Used for:  Postoperative pain   Notes to Patient:  Start at bedtime per anesthesia instructions.        Dose:  2 tablet   Take 2 tablets by mouth every 4 hours as needed for moderate to severe pain maximum 14 tablet(s) per day   Quantity:  44 tablet   Refills:  0         CONTINUE these medicines which have NOT CHANGED        Dose / Directions    albuterol 108 (90 BASE) MCG/ACT Inhaler   Commonly known as:  PROAIR HFA/PROVENTIL HFA/VENTOLIN HFA        Dose:  2 puff   Inhale 2 puffs into the lungs every 6 hours   Refills:  0       IBUPROFEN PO        Dose:  600 mg   Take 600 mg by mouth every 6 hours as needed for moderate pain    Refills:  0       OMEPRAZOLE PO        Dose:  40 mg   Take 40 mg by mouth every morning   Refills:  0            Where to get your medicines      Some of these will need a paper prescription and others can be bought over the counter. Ask your nurse if you have questions.     Bring a paper prescription for each of these medications     aspirin  MG EC tablet    hydrOXYzine 25 MG tablet    oxyCODONE-acetaminophen 7.5-325 MG per tablet                Protect others around you: Learn how to safely use, store and throw away your medicines at www.disposemymeds.org.             Medication List: This is a list of all your medications and when to take them. Check marks below indicate your daily home schedule. Keep this list as a reference.      Medications           Morning Afternoon Evening Bedtime As Needed    albuterol 108 (90 BASE) MCG/ACT Inhaler   Commonly known as:  PROAIR HFA/PROVENTIL HFA/VENTOLIN HFA   Inhale 2 puffs into the lungs every 6 hours                                aspirin  MG EC tablet   Take 1 tablet (325 mg) by mouth daily   Start taking on:  8/4/2017   Notes to Patient:  Start tomorrow.                                hydrOXYzine 25 MG tablet   Commonly known as:  ATARAX   Take 1 tablet (25 mg) by mouth every 4 hours as needed for itching (and nausea & pain)   Notes to Patient:  Start when needed. This medication will make your pain pill stronger.                                IBUPROFEN PO   Take 600 mg by mouth every 6 hours as needed for moderate pain                                OMEPRAZOLE PO   Take 40 mg by mouth every morning                                oxyCODONE-acetaminophen 7.5-325 MG per tablet   Commonly known as:  PERCOCET   Take 2 tablets by mouth every 4 hours as needed for moderate to severe pain maximum 14 tablet(s) per day   Notes to Patient:  Start at bedtime per anesthesia instructions.

## 2017-08-03 NOTE — PLAN OF CARE
Pt refused to do urine pregnancy test. She states she had a tubal Ligation. Explained the risks to her. Chase MCKEON notified.

## 2017-08-03 NOTE — OP NOTE
[facility] ORTHOPEDICS OPERATIVE REPORT  Operative Report - Orthopedics  Maris Wagner,  1975, MRN [id number]    Surgery Date: 17    PCP: Zay Madrid, [phone]   Code status:  No Order       OPERATION SITE:  [facility] Operating Room       OPERATIVE REPORT  DR. CLINT LOERA  FOOT & ANKLE SURGEON  [facility] ORTHOPEDICS    DATE OF PROCEDURE: 17    SITE: [facility] Operating Room    SURGEON: Dr. Clint Loera - [facility]    ASSISTANT: Delma De La Rosa, PGY II    PREOPERATIVE DIAGNOSES:  1. Right ankle fibular nonunion  2. Ongoing right ankle instability post trauma  3. Concern for underlying syndesmotic injury    POSTOPERATIVE DIAGNOSES:  1. Right ankle fibular nonunion  2. Compromised/torn anterior talofibular ligament  3. Arthroscopically confirmed syndesmotic instability/compromise  4. Ankle impingement post traumatic    PROCEDURES PERFORMED:  1. Right ankle arthroscopic evaluation and debridement-extensive  2. Right ankle syndesmotic evaluation arthroscopic, confirmed open book type syndesmotic injury, anterior  3. Open reduction internal fixation and repair of fibular nonunion with grafting  4. Lateral ankle ligamentous repair anterior talofibular ligament, traumatic rupture  5. Intraoperative fluoroscopy  6. Posterior splint application below the knee ankle neutral, fiberglass    ANESTHESIA: Gen. with popliteal block performed under ultrasonic guidance    POSITION: Supine    HEMOSTASIS: Right thigh tourniquet at 3 mmHg for the duration of the procedure    ESTIMATED BLOOD LOSS: 10 mL    FINDINGS: Right ankle demonstrated post traumatic significant impingement as well as synovial banding. Cartilaginous surfaces were intact. Scope evaluation of the ankle did reveal significantly unstable syndesmotic margin. Findings consistent with fibular fracture nonunion. Additionally gross rupture viewed arthroscopically of the anterior talofibular ligament  is also appreciated from open view.    IMPLANTS:  Arthrex ankle fracture system utilized to complete the fibular fracture nonunion ORIF with compression screw by technique lateral based fibular plate. Additional suture tack anchor utilized to complete the oral ankle ligament repair. The syndesmotic margin repaired with tight rope anchor.    SPECIMENS: None.    INDICATIONS FOR THE OPERATION:   42 year old female has been seen and evaluated for the previously listed diagnoses.   Both operative and non operative care options have been reviewed for this condition.  They are aware of the operation implications and associated pros, cons, risks and benefits.  They were made aware of the post-operative demands and details.  She gives verbal and written consent to the above mentioned procedures.      DESCRIPTION OF THE PROCEDURE:  The patient was identified in the preoperative holding area.  The surgical site was marked.  The operative extremity was initialed.  The History and Physical examination was reviewed and/or updated as necessary.  The operative consent was reviewed, confirmed and signed by the patient and procedural details were again reviewed with patient and family members present.    The patient was then taken to the operating room assigned and was positioned on the operative table.  Anesthesia was then administered and the airway was maintained.  IV antibiotics were administered.  The tourniquet was applied.  Right foot and ankle margins were then prepped and draped in a sterile manner/aseptic technique.  A surgical time-out was then performed to identify the proper patient, surgical site(s) and the procedures to be performed.  Following this, local anesthetic was administered about the operative site utilizing a mixture of 1:1 2% Lidocaine plain and 0.5% Marcaine plain, for a total of 30 mL's.  The esmarch was then utilized to exsanguinate the patient's extremity and the tourniquet was inflated for the duration  of the procedures.    Attention was then directed to the right ankle. Prior to draping the thigh tourniquet was applied. The ankle joint was insufflated with 15 cc of sterile saline. The ankle joint external distractor was applied after exsanguination of the leg and inflation of tourniquet. The anteromedial and anterolateral portals were established through safe zones of the right ankle. These are then utilized interchangeably as viewing and working portals. The ankle joint was inspected and found to be with significant posttraumatic synovitis and synovial banding restricting some motion. This was documented with arthroscopic imaging. It was debrided with a 3.5 full-radius resector. The cartilaginous surface were found to be intact without significant cartilage defect. The syndesmotic margin was fully inspected. His found to be unstable with an inferior view. There is also significant inflammatory tissue present here. With external rotation exam is found to open up indicating an open book type compromise to the anterior margin of the syndesmosis. Additionally, there was found to be lateral compromise to the anterior talofibular ligament and lateral capsular structures. Pre and post-debridement arthroscopic images were obtained. The ankle joint was thoroughly lavaged and the portals were reapproximated with figure-of-eight stitch. Then, lateral fibular incision was made. Through this 10 cm incision made with a 15 blade layered dissection completed down to the level of the fibula. The lateral periosteum was released and opened up. The fibular fracture nonunion was readily identifiable. It was too predated with rongeur and curettage. The bone margins were freshened up and sequentially drilled for autogenous bleeding and bone grafting into the area. This area was then lined up. This was lined up anatomically utilizing intraoperative fluoroscopy. A compression screw was then placed from anterior to posterior by technique  across the fracture and perpendicular to it. The lateral based fibular one third tubular locking plate was applied first with nonlocking screws and locking screws. Stability about the fibula was appreciated. Then intraoperatively stress examination including a cotton test and external rotation of the syndesmosis was completed. This proved the syndesmosis to be unstable. Secondary to this tight rope fixation was conducted under intraoperative fluoroscopy through the plate. Stability about the syndesmosis was appreciated after the knotless tight rope was placed across the syndesmotic margin at the appropriate level. The lateral capsular and anterior talofibular ligament compromise was appreciated. This was debrided and imbricated back to the fibula for stability of the lateral ankle ligamentous margin via anchor material. This improved the lateral stability. The lateral side was then irrigated and closed in anatomic layers with 2-0, 3-0 Vicryl and 3-0 nylon.    A sterile compressive, layered dressing was then applied.  Vascular status was intact after deflation of the tourniquet.    SPLINT:  A custom, posterior neutral, fiberglass, below-knee, ankle splint was then applied with the ankle in the appropriate position.      COMPLICATIONS: No direct complications were  encountered throughout the procedures.    She tolerated these procedures well and was transferred from the operative table to the transport cart and taken from the OR to the PACU.  In PACU, patient and staff given orders and instructions for post-operative care in the following areas: post op pain management, weight-bearing status, dressing/splint care, weight-bearing assistive devices, elevation of the extremity, ice/cold therapy, DVT/blood clot prevention, nutrition and post-operative concerns to be aware.  Case and post-operative care measures were reviewed with the patient and family members present.  A post operative instruction sheet was provided.  If  concerns or questions arise they will contact our clinic.  If acute concerns develop they will present emergently to a nearby medical center. They were again briefed on the unique nature and combination of things present within this case. Clinical follow-up will be conducted until healing is been obtained.            Dr. Clint Simon, DPM, FACFAS  Foot & Ankle Surgeon/Specialist  [facility] ORTHOPEDICS                CC: [facility] - [facility]

## 2017-08-03 NOTE — ANESTHESIA PROCEDURE NOTES
Peripheral nerve/Neuraxial procedure note : Popliteal, Saphenous and Sciatic  Pre-Procedure  Performed by  JERED LEONG   Location: pre-op      Pre-Anesthestic Checklist: patient identified, IV checked, site marked, risks and benefits discussed, informed consent, monitors and equipment checked, pre-op evaluation, at physician/surgeon's request and post-op pain management    Timeout  Correct Patient: Yes   Correct Procedure: Yes   Correct Site: Yes   Correct Laterality: Yes   Correct Position: Yes   Site Marked: Yes   .   Procedure Documentation    Diagnosis:POST OP PAIN.    Procedure:    Popliteal, Saphenous and Sciatic.  Local skin infiltrated with 4 mL of 1% lidocaine.     Ultrasound used to identify targeted nerve, plexus, or vascular marker and placed a needle adjacent to it., Ultrasound was used to visualize the spread of the anesthetic in close proximity to the above stated nerve. A permanent image is entered into the patient's record.  Patient Prep;mask, sterile gloves, chlorhexidine gluconate and isopropyl alcohol, patient draped.  Nerve Stim: Initial Level 1 mA.  Lowest motor response 0.4 mA..  Needle: insulated Needle Gauge: 21.    Needle Length (Inches) 4  Insertion Method: Single Shot.       Assessment/Narrative  Paresthesias: No.  Injection made incrementally with aspirations every 5 mL..  The placement was negative for: blood aspirated, painful injection and site bleeding.  Bolus given via needle..   Secured via.   Complications: none. Test dose of 5 mL lidocaine 2% w/ 1:200,000 epinephrine at 12:02. . Comments:  Pt tolerated procedure well.

## 2017-08-08 ENCOUNTER — APPOINTMENT (OUTPATIENT)
Dept: ULTRASOUND IMAGING | Facility: CLINIC | Age: 42
End: 2017-08-08
Attending: EMERGENCY MEDICINE
Payer: COMMERCIAL

## 2017-08-08 ENCOUNTER — HOSPITAL ENCOUNTER (EMERGENCY)
Facility: CLINIC | Age: 42
Discharge: HOME | End: 2017-08-09
Attending: EMERGENCY MEDICINE | Admitting: EMERGENCY MEDICINE
Payer: COMMERCIAL

## 2017-08-08 VITALS
HEART RATE: 115 BPM | RESPIRATION RATE: 18 BRPM | BODY MASS INDEX: 39.65 KG/M2 | DIASTOLIC BLOOD PRESSURE: 93 MMHG | WEIGHT: 238 LBS | HEIGHT: 65 IN | SYSTOLIC BLOOD PRESSURE: 137 MMHG | OXYGEN SATURATION: 99 % | TEMPERATURE: 98.1 F

## 2017-08-08 DIAGNOSIS — Z87.81 STATUS POST OPEN REDUCTION WITH INTERNAL FIXATION OF FRACTURE: ICD-10-CM

## 2017-08-08 DIAGNOSIS — Z98.890 STATUS POST OPEN REDUCTION WITH INTERNAL FIXATION OF FRACTURE: ICD-10-CM

## 2017-08-08 DIAGNOSIS — Z48.00 CHANGE OF DRESSING: ICD-10-CM

## 2017-08-08 PROCEDURE — 96372 THER/PROPH/DIAG INJ SC/IM: CPT

## 2017-08-08 PROCEDURE — 25000128 H RX IP 250 OP 636: Performed by: EMERGENCY MEDICINE

## 2017-08-08 PROCEDURE — 99285 EMERGENCY DEPT VISIT HI MDM: CPT | Mod: 25

## 2017-08-08 PROCEDURE — 99285 EMERGENCY DEPT VISIT HI MDM: CPT | Mod: Z6 | Performed by: EMERGENCY MEDICINE

## 2017-08-08 PROCEDURE — 93971 EXTREMITY STUDY: CPT | Mod: RT

## 2017-08-08 RX ADMIN — HYDROMORPHONE HYDROCHLORIDE 1 MG: 1 INJECTION, SOLUTION INTRAMUSCULAR; INTRAVENOUS; SUBCUTANEOUS at 23:54

## 2017-08-08 NOTE — ED AVS SNAPSHOT
[facility] Emergency Department    [address] [address]    Phone:  [phone]    Fax:  [phone]                                       Maris Wagner   MRN: [id number]    Department:  [facility] Emergency Department   Date of Visit:  8/8/2017           After Visit Summary Signature Page     I have received my discharge instructions, and my questions have been answered. I have discussed any challenges I see with this plan with the nurse or doctor.    ..........................................................................................................................................  Patient/Patient Representative Signature      ..........................................................................................................................................  Patient Representative Print Name and Relationship to Patient    ..................................................               ................................................  Date                                            Time    ..........................................................................................................................................  Reviewed by Signature/Title    ...................................................              ..............................................  Date                                                            Time

## 2017-08-08 NOTE — ED AVS SNAPSHOT
[facility] Emergency Department    [address] [place] [address]    Phone:  [phone]    Fax:  [phone]                                       Maris Wagner   MRN: [id number]    Department:  [facility] Emergency Department   Date of Visit:  8/8/2017           Patient Information     Date Of Birth          1975        Your diagnoses for this visit were:     Status post open reduction with internal fixation of fracture     Change of dressing        You were seen by Cruz Bucio DO.        Discharge Instructions       Keep splint/dressing dry  Elevate  Keep appointment as scheduled with Dr. Simon.      24 Hour Appointment Hotline       To make an appointment at any [facility] clinic, call [phone] ([phone]). If you don't have a family doctor or clinic, we will help you find one. [facility] clinics are conveniently located to serve the needs of you and your family.             Review of your medicines      Our records show that you are taking the medicines listed below. If these are incorrect, please call your family doctor or clinic.        Dose / Directions Last dose taken    albuterol 108 (90 BASE) MCG/ACT Inhaler   Commonly known as:  PROAIR HFA/PROVENTIL HFA/VENTOLIN HFA   Dose:  2 puff        Inhale 2 puffs into the lungs every 6 hours   Refills:  0        aspirin  MG EC tablet   Dose:  325 mg   Quantity:  20 tablet        Take 1 tablet (325 mg) by mouth daily   Refills:  3        hydrOXYzine 25 MG tablet   Commonly known as:  ATARAX   Dose:  25 mg   Quantity:  44 tablet        Take 1 tablet (25 mg) by mouth every 4 hours as needed for itching (and nausea & pain)   Refills:  0        IBUPROFEN PO   Dose:  600 mg        Take 600 mg by mouth every 6 hours as needed for moderate pain   Refills:  0        OMEPRAZOLE PO   Dose:  40 mg        Take 40 mg by mouth every morning   Refills:  0        oxyCODONE-acetaminophen 7.5-325 MG per tablet   Commonly known as:   PERCOCET   Dose:  2 tablet   Quantity:  44 tablet        Take 2 tablets by mouth every 4 hours as needed for moderate to severe pain maximum 14 tablet(s) per day   Refills:  0                Procedures and tests performed during your visit     US Lower Extremity Venous Duplex Right      Orders Needing Specimen Collection     None      Pending Results     No orders found for last 3 day(s).            Pending Culture Results     No orders found for last 3 day(s).            Pending Results Instructions     If you had any lab results that were not finalized at the time of your Discharge, you can call the ED Lab Result RN at [phone]. You will be contacted by this team for any positive Lab results or changes in treatment. The nurses are available 7 days a week from 10A to 6:30P.  You can leave a message 24 hours per day and they will return your call.        Test Results From Your Hospital Stay        8/9/2017 12:09 AM      Narrative     US LOWER EXTREMITY VENOUS DUPLEX RIGHT  8/8/2017 11:32 PM     HISTORY: Status post surgery 8/3/2017 - rule out DVT. Pain at ankle.  Ankle surgery. Rule out DVT.    TECHNIQUE: Venous Doppler US of the lower extremity. Color flow and  spectral Doppler with waveform analysis performed.    COMPARISON: None.    FINDINGS: Ultrasound of the right leg demonstrates no deep vein  thrombus from the common femoral through popliteal veins or in the  visualized segments of posterior tibial veins in the calf.        Impression     IMPRESSION: No DVT identified right leg.    POPEYE DE MD                Thank you for choosing [facility]       Thank you for choosing [facility] for your care. Our goal is always to provide you with excellent care. Hearing back from our patients is one way we can continue to improve our services. Please take a few minutes to complete the written survey that you may receive in the mail after you visit with us. Thank you!        MyChart Information     [facility] gives  you secure access to your electronic health record. If you see a primary care provider, you can also send messages to your care team and make appointments. If you have questions, please call your primary care clinic.  If you do not have a primary care provider, please call [phone] and they will assist you.        Care EveryWhere ID     This is your Care EveryWhere ID. This could be used by other organizations to access your [facility] medical records  [id number]        Equal Access to Services     JEFFERY CHIANG : Hadii guerita solanoo Sodiane, waaxda luqadaha, qaybta kaalmada aramis, reginald lee . So [facility] [phone].    ATENCIÓN: Si habla español, tiene a cabrales disposición servicios gratuitos de asistencia lingüística. Beckame al [phone].    We comply with applicable federal civil rights laws and [place] laws. We do not discriminate on the basis of race, color, national origin, age, disability sex, sexual orientation or gender identity.            After Visit Summary       This is your record. Keep this with you and show to your community pharmacist(s) and doctor(s) at your next visit.

## 2017-08-09 NOTE — ED PROVIDER NOTES
History     Chief Complaint   Patient presents with     Post-op Problem     pt s/p pinning and plating R ankle 8/3/17, she presents today with a red area posterior R calf and increased RLE pain     HPI     Maris Wagner is a 42 year old female who presents for evaluation of posterior right calf discoloration. Patient is status post ORIF right ankle from August 3.  Surgery completed per Dr. Simon.  Patient is concerned about skin discoloration of the proximal posterior calf just distal to the popliteal fossa.  No shortness of breath or chest discomfort.      I have reviewed the Medications, Allergies, Past Medical and Surgical History, and Social History in the Epic system.    Allergies:   Allergies   Allergen Reactions     Nka [No Known Allergies]          No current facility-administered medications on file prior to encounter.   Current Outpatient Prescriptions on File Prior to Encounter:  hydrOXYzine (ATARAX) 25 MG tablet Take 1 tablet (25 mg) by mouth every 4 hours as needed for itching (and nausea & pain)   oxyCODONE-acetaminophen (PERCOCET) 7.5-325 MG per tablet Take 2 tablets by mouth every 4 hours as needed for moderate to severe pain maximum 14 tablet(s) per day   aspirin  MG EC tablet Take 1 tablet (325 mg) by mouth daily   albuterol (PROAIR HFA/PROVENTIL HFA/VENTOLIN HFA) 108 (90 BASE) MCG/ACT Inhaler Inhale 2 puffs into the lungs every 6 hours   IBUPROFEN PO Take 600 mg by mouth every 6 hours as needed for moderate pain   OMEPRAZOLE PO Take 40 mg by mouth every morning       Patient Active Problem List   Diagnosis     CARDIOVASCULAR SCREENING; LDL GOAL LESS THAN 130     Ileus, postoperative (H)     Spinal stenosis of lumbar region     Anemia     Gastroesophageal reflux disease     Irritable bowel syndrome     Seasonal allergies     Obesity       Past Surgical History:   Procedure Laterality Date     ARTHROSCOPY ANKLE, OPEN REPAIR LIGAMENT, COMBINED Right 8/3/2017    Procedure: COMBINED ARTHROSCOPY  "ANKLE, OPEN REPAIR LIGAMENT;  Right Ankle Arthroscopic Evaluation and Debridement,Lateral Ligament Repair,Fracture Evaluation, Open Reduction Internal Fixation ;  Surgeon: Clint Simon DPM;  Location: [facility] OR     OPEN REDUCTION INTERNAL FIXATION ANKLE Right 8/3/2017    Procedure: OPEN REDUCTION INTERNAL FIXATION ANKLE;;  Surgeon: Clint Simon DPM;  Location: [facility] OR     REMOVE FOREIGN BODY FINGER Left 3/28/2017    Procedure: REMOVE FOREIGN BODY FINGER;  Surgeon: Tabby Chan MD;  Location: [facility] OR     TUBAL LIGATION         Social History   Substance Use Topics     Smoking status: Current Every Day Smoker     Packs/day: 1.00     Types: Cigarettes     Smokeless tobacco: Never Used     Alcohol use Yes      Comment: rare       Most Recent Immunizations   Administered Date(s) Administered     Pneumococcal 23 valent 12/10/2012     TDAP Vaccine (Adacel) 12/10/2012       BMI: Estimated body mass index is 39.61 kg/(m^2) as calculated from the following:    Height as of this encounter: 1.651 m (5' 5\").    Weight as of this encounter: 108 kg (238 lb).      Review of Systems   All other systems reviewed and are negative.   denies fever or chills  No shortness of breath or chest discomfort  No previous DVT/VTE    Physical Exam   BP: (!) 137/93  Pulse: 115  Temp: 98.1  F (36.7  C)  Resp: 18  Height: 165.1 cm (5' 5\")  Weight: 108 kg (238 lb)  SpO2: 99 %    Physical Exam  Obese female  Elevated pulse noted  No distress  Respiratory difficulties  Post operative surgical dressing was removed from the right lower extremity for inspection.  The old dressing was saturated in dried old blood.  Partially adherent to the incision.  Incision on the lateral side is healing well with no signs for infection.  There is no evidence for seroma.  2 small incisions on the medial side are also healing well.  The calf was not swollen nor tender.  The patient's discoloration appear to be coming from a superficial capillary break.  " Could not assess Homans test with recent surgery    ED Course     ED Course     Procedures           Results for orders placed or performed during the hospital encounter of 08/08/17   US Lower Extremity Venous Duplex Right    Narrative    US LOWER EXTREMITY VENOUS DUPLEX RIGHT  8/8/2017 11:32 PM     HISTORY: Status post surgery 8/3/2017 - rule out DVT. Pain at ankle.  Ankle surgery. Rule out DVT.    TECHNIQUE: Venous Doppler US of the lower extremity. Color flow and  spectral Doppler with waveform analysis performed.    COMPARISON: None.    FINDINGS: Ultrasound of the right leg demonstrates no deep vein  thrombus from the common femoral through popliteal veins or in the  visualized segments of posterior tibial veins in the calf.      Impression    IMPRESSION: No DVT identified right leg.    POPEYE DE MD       Assessments & Plan (with Medical Decision Making)  Examination place this patient is low risk for DVT though she is in a high risk setting with her recent ORIF, immobilization, sedentary positioning and obesity.  Ultrasound pending.  Wound will be cleansed and redressed.    No  dvt     I have reviewed the nursing notes.    I have reviewed the findings, diagnosis, plan and need for follow up with the patient.      New Prescriptions    No medications on file       Final diagnoses:   Status post open reduction with internal fixation of fracture   Change of dressing       8/8/2017   [facility] EMERGENCY DEPARTMENT     Cruz Bucio, DO  08/15/17 0001

## 2017-09-07 ENCOUNTER — OFFICE VISIT (OUTPATIENT)
Dept: FAMILY MEDICINE | Facility: CLINIC | Age: 42
End: 2017-09-07
Payer: COMMERCIAL

## 2017-09-07 VITALS
SYSTOLIC BLOOD PRESSURE: 116 MMHG | TEMPERATURE: 98.9 F | HEIGHT: 65 IN | HEART RATE: 87 BPM | BODY MASS INDEX: 41.48 KG/M2 | WEIGHT: 249 LBS | DIASTOLIC BLOOD PRESSURE: 76 MMHG

## 2017-09-07 DIAGNOSIS — G89.29 CHRONIC LOW BACK PAIN WITH SCIATICA, SCIATICA LATERALITY UNSPECIFIED, UNSPECIFIED BACK PAIN LATERALITY: ICD-10-CM

## 2017-09-07 DIAGNOSIS — M51.26 RECURRENT HERNIATION OF LUMBAR DISC: Primary | ICD-10-CM

## 2017-09-07 DIAGNOSIS — M54.40 CHRONIC LOW BACK PAIN WITH SCIATICA, SCIATICA LATERALITY UNSPECIFIED, UNSPECIFIED BACK PAIN LATERALITY: ICD-10-CM

## 2017-09-07 DIAGNOSIS — S82.831S CLOSED FRACTURE OF DISTAL END OF RIGHT FIBULA, UNSPECIFIED FRACTURE MORPHOLOGY, SEQUELA: ICD-10-CM

## 2017-09-07 PROBLEM — M54.9 CHRONIC BACK PAIN: Status: ACTIVE | Noted: 2017-04-12

## 2017-09-07 PROCEDURE — 99213 OFFICE O/P EST LOW 20 MIN: CPT | Performed by: FAMILY MEDICINE

## 2017-09-07 RX ORDER — PREGABALIN 50 MG/1
50 CAPSULE ORAL
COMMUNITY
Start: 2017-04-14 | End: 2017-09-07

## 2017-09-07 RX ORDER — PREGABALIN 50 MG/1
50 CAPSULE ORAL DAILY
Qty: 90 CAPSULE | Refills: 0 | Status: SHIPPED | OUTPATIENT
Start: 2017-09-07 | End: 2017-10-10 | Stop reason: DRUGHIGH

## 2017-09-07 NOTE — MR AVS SNAPSHOT
After Visit Summary   9/7/2017    Maris Wagner    MRN: [id number]           Patient Information     Date Of Birth          1975        Visit Information        Provider Department      9/7/2017 12:40 PM Arsh Alvarez MD [facility]        Today's Diagnoses     Recurrent herniation of lumbar disc    -  1    Closed fracture of distal end of right fibula, unspecified fracture morphology, sequela        Chronic low back pain with sciatica, sciatica laterality unspecified, unspecified back pain laterality          Care Instructions    Orthopedic specialist and spine specialist referrals have been placed so you may continue to see them.    Continue taking Lyrica as you have been.    Follow up in clinic in 3 months or as needed for other medical needs.          Follow-ups after your visit        Additional Services     ORTHO  REFERRAL       [facility] is referring you to the Orthopedic  Services at [facility].       The  Representative will assist you in the coordination of your Orthopedic and Musculoskeletal Care as prescribed by your physician.    The  Representative will call you within 1 business day to help schedule your appointment, or you may contact the  Representative at:    All areas ~ [phone]     Type of Referral : Spine: Lumbar  **Choose Medical Spine Specialist (unless patient was seen by a Medical Spine Specialist within the past 6 months).**  Surgical Evaluation is advised if the patient presents with one or more of the following red flags: Evidence of Spinal Tumor, Infection or Fracture, Cauda Equina Syndrome, Sudden or Progressive Weakness, Loss of Bowel or Bladder Control, or any other documented emergent neurological condition resulting from a Lumbar Spinal Condition. Spine Surgeon - Dr. Kim [facility] [place] - patient has already been seeing  him.    Orthopedic Surgical / Specialist - patient has already been seeing Dr. Simon      Timeframe requested: patient has already been established with the above providers.    Coverage of these services is subject to the terms and limitations of your health insurance plan.  Please call member services at your health plan with any benefit or coverage questions.      If X-rays, CT or MRI's have been performed, please contact the facility where they were done to arrange for , prior to your scheduled appointment.  Please bring this referral request to your appointment and present it to your specialist.                  Who to contact     If you have questions or need follow up information about today's clinic visit or your schedule please contact [facility] directly at [phone].  Normal or non-critical lab and imaging results will be communicated to you by MyChart, letter or phone within 4 business days after the clinic has received the results. If you do not hear from us within 7 days, please contact the clinic through [employer] or phone. If you have a critical or abnormal lab result, we will notify you by phone as soon as possible.  Submit refill requests through [employer] or call your pharmacy and they will forward the refill request to us. Please allow 3 business days for your refill to be completed.          Additional Information About Your Visit        [employer] Information     [employer] gives you secure access to your electronic health record. If you see a primary care provider, you can also send messages to your care team and make appointments. If you have questions, please call your primary care clinic.  If you do not have a primary care provider, please call [phone] and they will assist you.        Care EveryWhere ID     This is your Care EveryWhere ID. This could be used by other organizations to access your [place] medical records  [id number]        Your Vitals Were     Pulse  "Temperature Height BMI (Body Mass Index)          87 98.9  F (37.2  C) (Tympanic) 5' 5\" (1.651 m) 41.44 kg/m2         Blood Pressure from Last 3 Encounters:   09/07/17 116/76   08/08/17 (!) 137/93   08/03/17 101/67    Weight from Last 3 Encounters:   09/07/17 249 lb (112.9 kg)   08/08/17 238 lb (108 kg)   08/03/17 238 lb (108 kg)              We Performed the Following     ORTHO  REFERRAL          Today's Medication Changes          These changes are accurate as of: 9/7/17  1:31 PM.  If you have any questions, ask your nurse or doctor.               These medicines have changed or have updated prescriptions.        Dose/Directions    pregabalin 50 MG capsule   Commonly known as:  LYRICA   This may have changed:  when to take this   Used for:  Recurrent herniation of lumbar disc, Chronic low back pain with sciatica, sciatica laterality unspecified, unspecified back pain laterality   Changed by:  Arsh Alvarez MD        Dose:  50 mg   Take 1 capsule (50 mg) by mouth daily   Quantity:  90 capsule   Refills:  0            Where to get your medicines      Some of these will need a paper prescription and others can be bought over the counter.  Ask your nurse if you have questions.     Bring a paper prescription for each of these medications     pregabalin 50 MG capsule                Primary Care Provider Office Phone # Fax #    Zay Madrid [phone]       [address] [address]        Equal Access to Services     JEFFERY CHIANG AH: Hadii guerita solanoo Sodiane, waaxda luqadaha, qaybta kaalmada adeegyada, waxay daquan paula. So [facility] [phone].    ATENCIÓN: Si habla español, tiene a cabrales disposición servicios gratuitos de asistencia lingüística. Llame al [phone].    We comply with applicable federal civil rights laws and [place] laws. We do not discriminate on the basis of race, color, national origin, age, " disability sex, sexual orientation or gender identity.            Thank you!     Thank you for choosing [facility]  for your care. Our goal is always to provide you with excellent care. Hearing back from our patients is one way we can continue to improve our services. Please take a few minutes to complete the written survey that you may receive in the mail after your visit with us. Thank you!             Your Updated Medication List - Protect others around you: Learn how to safely use, store and throw away your medicines at www.disposemymeds.org.          This list is accurate as of: 9/7/17  1:31 PM.  Always use your most recent med list.                   Brand Name Dispense Instructions for use Diagnosis    albuterol 108 (90 BASE) MCG/ACT Inhaler    PROAIR HFA/PROVENTIL HFA/VENTOLIN HFA     Inhale 2 puffs into the lungs every 6 hours        hydrOXYzine 25 MG tablet    ATARAX    44 tablet    Take 1 tablet (25 mg) by mouth every 4 hours as needed for itching (and nausea & pain)    Postoperative pain       IBUPROFEN PO      Take 600 mg by mouth every 6 hours as needed for moderate pain        OMEPRAZOLE PO      Take 40 mg by mouth every morning        oxyCODONE-acetaminophen 7.5-325 MG per tablet    PERCOCET    44 tablet    Take 2 tablets by mouth every 4 hours as needed for moderate to severe pain maximum 14 tablet(s) per day    Postoperative pain       pregabalin 50 MG capsule    LYRICA    90 capsule    Take 1 capsule (50 mg) by mouth daily    Recurrent herniation of lumbar disc, Chronic low back pain with sciatica, sciatica laterality unspecified, unspecified back pain laterality

## 2017-09-07 NOTE — PROGRESS NOTES
SUBJECTIVE:   Maris Wagner is a 42 year old female who presents to clinic today for the following health issues:  Chief Complaint   Patient presents with     Establish Care     Pt here to establish care.     Referral     Pt needs referrals to see her regular doctors.       Concern - pt needs referrals to see her regular doctors due to being on restrictions from [employer]  Pt sees Dr Kim at [facility] for her back specialist. Recurrent disc herniation, s/p multiple vertebral lumbar fusions  Pt sees Dr Liz at [facility] for ankle f/u.  Ankle surgery 8/3/17.  Pt sees Dr Madrid at [facility] for primary care     Medication Followup of lyrica- needs new rx due to med being denied due to her restrictions    Taking Medication as prescribed: yes    Side Effects:  None    Medication Helping Symptoms:  Yes    Patient had Rx for this med given to her in 4/2017 by Dr. Kim (spine).    Patient ha sbeen using this for her chronic low back pain.    Patient states she has been taking only once a day, sometimes twice a day.       Problem list and histories reviewed & adjusted, as indicated.  Additional history: as documented    Patient Active Problem List   Diagnosis     CARDIOVASCULAR SCREENING; LDL GOAL LESS THAN 130     Ileus, postoperative (H)     Spinal stenosis of lumbar region     Anemia     Gastroesophageal reflux disease     Irritable bowel syndrome     Seasonal allergies     Obesity     S/P lumbar discectomy     Chronic back pain     Recurrent herniation of lumbar disc     Herniation of lumbar intervertebral disc without myelopathy     Past Surgical History:   Procedure Laterality Date     ARTHROSCOPY ANKLE, OPEN REPAIR LIGAMENT, COMBINED Right 8/3/2017    Procedure: COMBINED ARTHROSCOPY ANKLE, OPEN REPAIR LIGAMENT;  Right Ankle Arthroscopic Evaluation and Debridement,Lateral Ligament Repair,Fracture Evaluation, Open Reduction Internal Fixation ;  Surgeon: Clint Simon DPM;  Location: [facility] OR      OPEN REDUCTION INTERNAL FIXATION ANKLE Right 8/3/2017    Procedure: OPEN REDUCTION INTERNAL FIXATION ANKLE;;  Surgeon: Clint Simon DPM;  Location: [facility] OR     REMOVE FOREIGN BODY FINGER Left 3/28/2017    Procedure: REMOVE FOREIGN BODY FINGER;  Surgeon: Tabby Chan MD;  Location: [facility] OR     TUBAL LIGATION         Social History   Substance Use Topics     Smoking status: Current Every Day Smoker     Packs/day: 1.00     Years: 25.00     Types: Cigarettes     Smokeless tobacco: Never Used     Alcohol use Yes      Comment: rare     Family History   Problem Relation Age of Onset     DIABETES Mother      pre diabetes     Arthritis Mother      Hypertension Father      CANCER Maternal Grandmother      CANCER Maternal Grandfather      CANCER Paternal Grandmother      CANCER Paternal Grandfather      mesothelioma         Current Outpatient Prescriptions   Medication Sig Dispense Refill     pregabalin (LYRICA) 50 MG capsule Take 1 capsule (50 mg) by mouth daily 90 capsule 0     albuterol (PROAIR HFA/PROVENTIL HFA/VENTOLIN HFA) 108 (90 BASE) MCG/ACT Inhaler Inhale 2 puffs into the lungs every 6 hours       OMEPRAZOLE PO Take 40 mg by mouth every morning       [DISCONTINUED] pregabalin (LYRICA) 50 MG capsule Take 50 mg by mouth       hydrOXYzine (ATARAX) 25 MG tablet Take 1 tablet (25 mg) by mouth every 4 hours as needed for itching (and nausea & pain) (Patient not taking: Reported on 9/7/2017) 44 tablet 0     oxyCODONE-acetaminophen (PERCOCET) 7.5-325 MG per tablet Take 2 tablets by mouth every 4 hours as needed for moderate to severe pain maximum 14 tablet(s) per day (Patient not taking: Reported on 9/7/2017) 44 tablet 0     IBUPROFEN PO Take 600 mg by mouth every 6 hours as needed for moderate pain       Allergies   Allergen Reactions     Nka [No Known Allergies]          Reviewed and updated as needed this visit by clinical staffTobacco  Allergies  Med Hx  Surg Hx  Fam Hx  Soc Hx      Reviewed and updated  "as needed this visit by Provider         ROS:  C: NEGATIVE for fever, chills, change in weight  I: NEGATIVE for worrisome rashes, moles or lesions  E: NEGATIVE for vision changes or irritation  E/M: NEGATIVE for ear, mouth and throat problems  R: NEGATIVE for significant cough or SOB  CV: NEGATIVE for chest pain, palpitations or peripheral edema  GI: NEGATIVE for nausea, abdominal pain, heartburn, or change in bowel habits  : NEGATIVE for frequency, dysuria, or hematuria  MUSCULOSKELETAL: see above  N: NEGATIVE for weakness, dizziness or paresthesias  E: NEGATIVE for temperature intolerance, skin/hair changes  H: NEGATIVE for bleeding problems    OBJECTIVE:                                                    /76  Pulse 87  Temp 98.9  F (37.2  C) (Tympanic)  Ht 5' 5\" (1.651 m)  Wt 249 lb (112.9 kg)  BMI 41.44 kg/m2  Body mass index is 41.44 kg/(m^2).  GENERAL: morbidly obese, alert and no distress  NECK: no tenderness, no adenopathy, no asymmetry, no masses, no stiffness  MS: extremities- no gross deformities noted, no edema  SKIN: no suspicious lesions, no rashes  NEURO: strength and tone- normal, sensory exam- grossly normal, reflexes- symmetric  BACK: normal curvature, no deformity, old incision scars from surgeries present,  SLR negative bilaterally    Diagnostic test results:  Diagnostic Test Results:  none        ASSESSMENT/PLAN:                                                        ICD-10-CM    1. Recurrent herniation of lumbar disc M51.26 ORTHO  REFERRAL - for Dr. Kim     pregabalin (LYRICA) 50 MG capsule - refilled   [place] PDMP did not show erratic Rx patterns for any of her medications.     2. Closed fracture of distal end of right fibula, unspecified fracture morphology, sequela S82.831S ORTHO  REFERRAL - for Dr. Simon  Patient to continue walker boot.  Activity as allowed by ortho.     3. Chronic low back pain with sciatica, sciatica laterality unspecified, unspecified back " pain laterality M54.40 ORTHO  REFERRAL      G89.29 pregabalin (LYRICA) 50 MG capsule     This provider was advised by Lety Lee CMA, who informed clinic management about the above requests. Due to insurance restrictions, referral to another primary provider is not able to be carried out.    Follow up with Provider - at her specialty appointments or prn   Patient Instructions   Orthopedic specialist and spine specialist referrals have been placed so you may continue to see them.    Continue taking Lyrica as you have been.    Follow up in clinic in 3 months or as needed for other medical needs.      Arsh Alvarez MD  [facility]

## 2017-09-07 NOTE — PATIENT INSTRUCTIONS
Orthopedic specialist and spine specialist referrals have been placed so you may continue to see them.    Continue taking Lyrica as you have been.    Follow up in clinic in 3 months or as needed for other medical needs.

## 2017-09-07 NOTE — NURSING NOTE
"Chief Complaint   Patient presents with     Establish Care     Pt here to establish care.     Referral     Pt needs referrals to see her regular doctors.       Initial /76  Pulse 87  Temp 98.9  F (37.2  C) (Tympanic)  Ht 5' 5\" (1.651 m)  Wt 249 lb (112.9 kg)  BMI 41.44 kg/m2 Estimated body mass index is 41.44 kg/(m^2) as calculated from the following:    Height as of this encounter: 5' 5\" (1.651 m).    Weight as of this encounter: 249 lb (112.9 kg).  Medication Reconciliation: complete  Louise Lee CMA    "

## 2017-09-15 ENCOUNTER — TELEPHONE (OUTPATIENT)
Dept: FAMILY MEDICINE | Facility: CLINIC | Age: 42
End: 2017-09-15

## 2017-09-15 DIAGNOSIS — K21.9 GASTROESOPHAGEAL REFLUX DISEASE WITHOUT ESOPHAGITIS: Primary | ICD-10-CM

## 2017-09-15 NOTE — TELEPHONE ENCOUNTER
Reason for Call:  Other call back    Detailed comments: [facility] program wants to know if it is ok with Dr. Alvarez for Doctors Julio and Dr. Liz to prescribe medications.   Please call [phone] with an answer.    Phone Number [facility] can be reached at: Other phone number:  [phone]    Best Time: any    Can we leave a detailed message on this number? YES    Call taken on 9/15/2017 at 10:53 AM by Tuyet Berger

## 2017-09-15 NOTE — TELEPHONE ENCOUNTER
Dr. Alvarez,    1.  Patient is restricted with her insurance.  [employer] would like to know if you are you ok with Dr. Kim at [facility] or Dr. Liz with [facility] can prescribe patient medications?  2.  Patient also called regarding her omperazole.  She forgot to mention OV 9-7-17 she is in need of a refill.  Dr. Madrid with [facility] was prescribing this for her previously.    OV notes 9-7-17:  1. Recurrent herniation of lumbar disc M51.26 ORTHO  REFERRAL - for Dr. Kim       pregabalin (LYRICA) 50 MG capsule - refilled   [place] PDMP did not show erratic Rx patterns for any of her medications.   2. Closed fracture of distal end of right fibula, unspecified fracture morphology, sequela S82.831S ORTHO  REFERRAL - for Dr. Simon  Patient to continue walker boot.  Activity as allowed by ortho.   3. Chronic low back pain with sciatica, sciatica laterality unspecified, unspecified back pain laterality M54.40 ORTHO  REFERRAL     G89.29 pregabalin (LYRICA) 50 MG capsule      This provider was advised by Lety Lee CMA, who informed clinic management about the above requests. Due to insurance restrictions, referral to another primary provider is not able to be carried out.        Routing to provider.  Junie WRIGHT RN

## 2017-09-15 NOTE — TELEPHONE ENCOUNTER
Pt calling back to let us know that she talked to [employer] and will be having an MRI done on her left foot this Monday here in [place].    [facility]

## 2017-09-15 NOTE — TELEPHONE ENCOUNTER
Patient is now calling stating she is restricted to Dr. Camp, had an appt with him yesterday, and forgot to mention that she needed her Omeprazole refilled. Can this be refilled? Dr. Zay Madrid from [facility] is the provider who originally ordered this medication for her. Please call the patient at [phone].  [facility]  [facility]

## 2017-09-18 ENCOUNTER — HOSPITAL ENCOUNTER (OUTPATIENT)
Dept: MRI IMAGING | Facility: CLINIC | Age: 42
Discharge: HOME | End: 2017-09-18
Attending: PODIATRIST | Admitting: PODIATRIST
Payer: COMMERCIAL

## 2017-09-18 ENCOUNTER — OFFICE VISIT (OUTPATIENT)
Dept: FAMILY MEDICINE | Facility: CLINIC | Age: 42
End: 2017-09-18
Payer: COMMERCIAL

## 2017-09-18 VITALS
HEART RATE: 89 BPM | SYSTOLIC BLOOD PRESSURE: 129 MMHG | DIASTOLIC BLOOD PRESSURE: 84 MMHG | TEMPERATURE: 98.9 F | HEIGHT: 65 IN | WEIGHT: 250.3 LBS | BODY MASS INDEX: 41.7 KG/M2

## 2017-09-18 DIAGNOSIS — K21.9 GASTROESOPHAGEAL REFLUX DISEASE WITHOUT ESOPHAGITIS: Primary | ICD-10-CM

## 2017-09-18 DIAGNOSIS — E53.8 VITAMIN B12 DEFICIENCY: ICD-10-CM

## 2017-09-18 DIAGNOSIS — M05.742 RHEUMATOID ARTHRITIS INVOLVING BOTH HANDS WITH POSITIVE RHEUMATOID FACTOR (H): ICD-10-CM

## 2017-09-18 DIAGNOSIS — M25.572 LEFT ANKLE PAIN, UNSPECIFIED CHRONICITY: ICD-10-CM

## 2017-09-18 DIAGNOSIS — D51.8 OTHER VITAMIN B12 DEFICIENCY ANEMIA: ICD-10-CM

## 2017-09-18 DIAGNOSIS — M05.741 RHEUMATOID ARTHRITIS INVOLVING BOTH HANDS WITH POSITIVE RHEUMATOID FACTOR (H): ICD-10-CM

## 2017-09-18 DIAGNOSIS — M25.50 MULTIPLE JOINT PAIN: ICD-10-CM

## 2017-09-18 LAB
ANION GAP SERPL CALCULATED.3IONS-SCNC: 8 MMOL/L (ref 3–14)
BUN SERPL-MCNC: 16 MG/DL (ref 7–30)
CALCIUM SERPL-MCNC: 8 MG/DL (ref 8.5–10.1)
CHLORIDE SERPL-SCNC: 108 MMOL/L (ref 94–109)
CO2 SERPL-SCNC: 23 MMOL/L (ref 20–32)
CREAT SERPL-MCNC: 0.78 MG/DL (ref 0.52–1.04)
CRP SERPL-MCNC: 6.4 MG/L (ref 0–8)
ERYTHROCYTE [DISTWIDTH] IN BLOOD BY AUTOMATED COUNT: 15.7 % (ref 10–15)
ERYTHROCYTE [SEDIMENTATION RATE] IN BLOOD BY WESTERGREN METHOD: 30 MM/H (ref 0–20)
GFR SERPL CREATININE-BSD FRML MDRD: 81 ML/MIN/1.7M2
GLUCOSE SERPL-MCNC: 93 MG/DL (ref 70–99)
HCT VFR BLD AUTO: 35.2 % (ref 35–47)
HGB BLD-MCNC: 11.5 G/DL (ref 11.7–15.7)
MCH RBC QN AUTO: 27.6 PG (ref 26.5–33)
MCHC RBC AUTO-ENTMCNC: 32.7 G/DL (ref 31.5–36.5)
MCV RBC AUTO: 85 FL (ref 78–100)
PLATELET # BLD AUTO: 209 10E9/L (ref 150–450)
POTASSIUM SERPL-SCNC: 3.8 MMOL/L (ref 3.4–5.3)
RBC # BLD AUTO: 4.16 10E12/L (ref 3.8–5.2)
SODIUM SERPL-SCNC: 139 MMOL/L (ref 133–144)
URATE SERPL-MCNC: 5.7 MG/DL (ref 2.6–6)
VIT B12 SERPL-MCNC: 184 PG/ML (ref 193–986)
WBC # BLD AUTO: 3.5 10E9/L (ref 4–11)

## 2017-09-18 PROCEDURE — 86431 RHEUMATOID FACTOR QUANT: CPT | Performed by: FAMILY MEDICINE

## 2017-09-18 PROCEDURE — 86039 ANTINUCLEAR ANTIBODIES (ANA): CPT | Performed by: FAMILY MEDICINE

## 2017-09-18 PROCEDURE — 73721 MRI JNT OF LWR EXTRE W/O DYE: CPT | Mod: LT

## 2017-09-18 PROCEDURE — 82607 VITAMIN B-12: CPT | Performed by: FAMILY MEDICINE

## 2017-09-18 PROCEDURE — 99214 OFFICE O/P EST MOD 30 MIN: CPT | Performed by: FAMILY MEDICINE

## 2017-09-18 PROCEDURE — 86140 C-REACTIVE PROTEIN: CPT | Performed by: FAMILY MEDICINE

## 2017-09-18 PROCEDURE — 85027 COMPLETE CBC AUTOMATED: CPT | Performed by: FAMILY MEDICINE

## 2017-09-18 PROCEDURE — 85652 RBC SED RATE AUTOMATED: CPT | Performed by: FAMILY MEDICINE

## 2017-09-18 PROCEDURE — 36415 COLL VENOUS BLD VENIPUNCTURE: CPT | Performed by: FAMILY MEDICINE

## 2017-09-18 PROCEDURE — 84550 ASSAY OF BLOOD/URIC ACID: CPT | Performed by: FAMILY MEDICINE

## 2017-09-18 PROCEDURE — 80048 BASIC METABOLIC PNL TOTAL CA: CPT | Performed by: FAMILY MEDICINE

## 2017-09-18 PROCEDURE — 86038 ANTINUCLEAR ANTIBODIES: CPT | Performed by: FAMILY MEDICINE

## 2017-09-18 RX ORDER — FAMOTIDINE 40 MG/1
40 TABLET, FILM COATED ORAL AT BEDTIME
Qty: 90 TABLET | Refills: 0 | Status: SHIPPED | OUTPATIENT
Start: 2017-09-18 | End: 2018-12-04

## 2017-09-18 RX ORDER — OMEPRAZOLE 40 MG/1
40 CAPSULE, DELAYED RELEASE ORAL EVERY MORNING
Qty: 90 CAPSULE | Refills: 0 | Status: SHIPPED | OUTPATIENT
Start: 2017-09-18 | End: 2017-10-10 | Stop reason: ALTCHOICE

## 2017-09-18 RX ORDER — PREGABALIN 75 MG/1
100 CAPSULE ORAL 2 TIMES DAILY
COMMUNITY
End: 2019-05-31

## 2017-09-18 ASSESSMENT — ANXIETY QUESTIONNAIRES
2. NOT BEING ABLE TO STOP OR CONTROL WORRYING: NOT AT ALL
6. BECOMING EASILY ANNOYED OR IRRITABLE: SEVERAL DAYS
5. BEING SO RESTLESS THAT IT IS HARD TO SIT STILL: NOT AT ALL
GAD7 TOTAL SCORE: 3
3. WORRYING TOO MUCH ABOUT DIFFERENT THINGS: NOT AT ALL
1. FEELING NERVOUS, ANXIOUS, OR ON EDGE: SEVERAL DAYS
IF YOU CHECKED OFF ANY PROBLEMS ON THIS QUESTIONNAIRE, HOW DIFFICULT HAVE THESE PROBLEMS MADE IT FOR YOU TO DO YOUR WORK, TAKE CARE OF THINGS AT HOME, OR GET ALONG WITH OTHER PEOPLE: NOT DIFFICULT AT ALL
7. FEELING AFRAID AS IF SOMETHING AWFUL MIGHT HAPPEN: NOT AT ALL

## 2017-09-18 ASSESSMENT — PATIENT HEALTH QUESTIONNAIRE - PHQ9
SUM OF ALL RESPONSES TO PHQ QUESTIONS 1-9: 1
5. POOR APPETITE OR OVEREATING: SEVERAL DAYS

## 2017-09-18 ASSESSMENT — PAIN SCALES - GENERAL: PAINLEVEL: MODERATE PAIN (4)

## 2017-09-18 NOTE — TELEPHONE ENCOUNTER
"Notified Mrais of the notes below per Dr Alvarez.       Spoke with Ynes- at [facility] restricted reciepient program .   \"Ok, we had to ask because it is not on the actual referral. In the future, please put ok to prescribe for those diagnoses in the future. \"      Per Ynes, at [facility], 9/6/17-9/6/19 is restricted to Dr Alvarez. I changed PCP on the chart to Kim.       Dr Alvarez,  Just an FYI, they took this info below about specialists and meds verbally this time, they are asking me to let you know in the future if you have a restricted recipient, to put \"Ok for specialist, (i.e. Dr Kim) to prescribe medications related to these diagnoses. (reason for the referral) \"    Hope that helps in the future!     Claribel Subramanian RNC          "

## 2017-09-18 NOTE — MR AVS SNAPSHOT
After Visit Summary   9/18/2017    Maris Wagner    MRN: [id number]           Patient Information     Date Of Birth          1975        Visit Information        Provider Department      9/18/2017 6:00 PM Arsh Alvarez MD [facility]        Today's Diagnoses     Gastroesophageal reflux disease without esophagitis    -  1    Multiple joint pain          Care Instructions    Start Famotidine 40 mg daily.  Hold omeprazole; you may take this for 2-3 weeks if you have reflux flare with famotidine.    Go to [facility] now for your blood draw.  You will be contacted in 1-2 days for results of your lab tests.    Thank you for choosing [facility].  You may be receiving a survey in the mail from [employer] regarding your visit today.  Please take a few minutes to complete and return the survey to let us know how we are doing.      If you have questions or concerns, please contact us via [employer] or you can contact your care team at [phone].    Our Clinic hours are:  Monday 6:40 am  to 7:00 pm  Tuesday -Friday 6:40 am to 5:00 pm    The [place] outpatient lab hours are:  Monday - Friday 6:10 am to 4:45 pm  Saturdays 7:00 am to 11:00 am  Appointments are required, call [phone]    If you have clinical questions after hours or would like to schedule an appointment,  call the clinic at [phone].    Tips to Control Acid Reflux    To control acid reflux, you ll need to make some basic diet and lifestyle changes. The simple steps outlined below may be all you ll need to ease discomfort.  Watch what you eat    Avoid fatty foods and spicy foods.    Eat fewer acidic foods, such as citrus and tomato-based foods. These can increase symptoms.    Limit drinking alcohol, caffeine, and fizzy beverages. All increase acid reflux.    Try limiting chocolate, peppermint, and spearmint. These can worsen acid reflux in some people.  Watch when you eat    Avoid lying down for 3 hours after  eating.    Do not snack before going to bed.  Raise your head  Raising your head and upper body by 4 to 6 inches helps limit reflux when you re lying down. Put blocks under the head of your bed frame to raise it.  Other changes    Lose weight, if you need to    Don t exercise near bedtime    Avoid tight-fitting clothes    Limit aspirin and ibuprofen    Stop smoking   Date Last Reviewed: 7/1/2016 2000-2017 [employer]. [address] [place] [address], [place] [address]. All rights reserved. This information is not intended as a substitute for professional medical care. Always follow your healthcare professional's instructions.        Arthralgia    Arthralgia is the term for pain in or around the joint. It is a symptom, not a disease. This pain may involve one or more joints. In some cases, the pain moves from joint to joint.  There are many causes for joint pain. These include:    Injury    Osteoarthritis (wearing out of the joint surface)    Gout (inflammation of the joint due to crystals in the joint fluid)    Infection inside the joint      Bursitis (inflammation of the fluid-filled sacs around the joint)    Autoimmune disorders such as rheumatoid arthritis or lupus    Tendonitis (inflammation of chords that attach muscle to bone)  Home care    Rest the involved joint(s) until your symptoms improve.     You may be prescribed pain medicine. If none is prescribed, you may use acetaminophen or ibuprofen to control pain and inflammation.  Follow-up care  Follow up with your healthcare provider or as advised.  When to seek medical advice  Contact your healthcare provider right away if any of the following occurs:    Pain, swelling, or redness of joint increases    Pain worsens or recurs after a period of improvement    Pain moves to other joints    You cannot bear weight on the affected joint     You cannot move the affected joint    Joint appears deformed    New rash appears    Fever of 100.4 F (38 C) or  "higher, or as directed by your healthcare provider  Date Last Reviewed: 3/1/2017    5572-3649 [employer]. [address] [place] [address], [place] [address]. All rights reserved. This information is not intended as a substitute for professional medical care. Always follow your healthcare professional's instructions.                Follow-ups after your visit        Follow-up notes from your care team     Return if symptoms worsen or fail to improve.      Who to contact     If you have questions or need follow up information about today's clinic visit or your schedule please contact [facility] directly at [phone].  Normal or non-critical lab and imaging results will be communicated to you by [employer], letter or phone within 4 business days after the clinic has received the results. If you do not hear from us within 7 days, please contact the clinic through [employer] or phone. If you have a critical or abnormal lab result, we will notify you by phone as soon as possible.  Submit refill requests through [employer] or call your pharmacy and they will forward the refill request to us. Please allow 3 business days for your refill to be completed.          Additional Information About Your Visit        MyChart Information     [employer] gives you secure access to your electronic health record. If you see a primary care provider, you can also send messages to your care team and make appointments. If you have questions, please call your primary care clinic.  If you do not have a primary care provider, please call [phone] and they will assist you.        Care EveryWhere ID     This is your Care EveryWhere ID. This could be used by other organizations to access your [place] medical records  [id number]        Your Vitals Were     Pulse Temperature Height BMI (Body Mass Index)          89 98.9  F (37.2  C) (Tympanic) 5' 5\" (1.651 m) 41.65 kg/m2         Blood Pressure from Last 3 Encounters:   09/18/17 " 129/84   09/07/17 116/76   08/08/17 (!) 137/93    Weight from Last 3 Encounters:   09/18/17 250 lb 4.8 oz (113.5 kg)   09/07/17 249 lb (112.9 kg)   08/08/17 238 lb (108 kg)              We Performed the Following     Anti Nuclear Wandy IgG by IFA with Reflex     Basic metabolic panel     CBC with platelets     CRP inflammation     Erythrocyte sedimentation rate auto     Rheumatoid factor     Uric acid     Vitamin B12          Today's Medication Changes          These changes are accurate as of: 9/18/17  6:27 PM.  If you have any questions, ask your nurse or doctor.               Start taking these medicines.        Dose/Directions    famotidine 40 MG tablet   Commonly known as:  PEPCID   Used for:  Gastroesophageal reflux disease without esophagitis   Started by:  Arsh Alvarez MD        Dose:  40 mg   Take 1 tablet (40 mg) by mouth At Bedtime   Quantity:  90 tablet   Refills:  0            Where to get your medicines      These medications were sent to [facility] [address] [facility]  [address] [place] [address]     Phone:  [phone]     famotidine 40 MG tablet                Primary Care Provider Office Phone # Fax #    Arsh Alvarez [phone] [address] [facility] [address]        Equal Access to Services     JEFFERY CHIANG AH: Hadii guerita arredondo hadasho Soomaali, waaxda luqadaha, qaybta kaalmada adeegyada, reginald paula. So [facility] [phone].    ATENCIÓN: Si habla español, tiene a cabrales disposición servicios gratuitos de asistencia lingüística. Llame al [phone].    We comply with applicable federal civil rights laws and [place] laws. We do not discriminate on the basis of race, color, national origin, age, disability sex, sexual orientation or gender identity.            Thank you!     Thank you for choosing [facility]  for your care. Our goal is always to provide you  with excellent care. Hearing back from our patients is one way we can continue to improve our services. Please take a few minutes to complete the written survey that you may receive in the mail after your visit with us. Thank you!             Your Updated Medication List - Protect others around you: Learn how to safely use, store and throw away your medicines at www.disposemymeds.org.          This list is accurate as of: 9/18/17  6:27 PM.  Always use your most recent med list.                   Brand Name Dispense Instructions for use Diagnosis    albuterol 108 (90 BASE) MCG/ACT Inhaler    PROAIR HFA/PROVENTIL HFA/VENTOLIN HFA     Inhale 2 puffs into the lungs every 6 hours        famotidine 40 MG tablet    PEPCID    90 tablet    Take 1 tablet (40 mg) by mouth At Bedtime    Gastroesophageal reflux disease without esophagitis       hydrOXYzine 25 MG tablet    ATARAX    44 tablet    Take 1 tablet (25 mg) by mouth every 4 hours as needed for itching (and nausea & pain)    Postoperative pain       IBUPROFEN PO      Take 600 mg by mouth every 6 hours as needed for moderate pain        omeprazole 40 MG capsule    priLOSEC    90 capsule    Take 1 capsule (40 mg) by mouth every morning    Gastroesophageal reflux disease without esophagitis       oxyCODONE-acetaminophen 7.5-325 MG per tablet    PERCOCET    44 tablet    Take 2 tablets by mouth every 4 hours as needed for moderate to severe pain maximum 14 tablet(s) per day    Postoperative pain       * LYRICA 75 MG capsule   Generic drug:  pregabalin      Take 75 mg by mouth 2 times daily        * pregabalin 50 MG capsule    LYRICA    90 capsule    Take 1 capsule (50 mg) by mouth daily    Recurrent herniation of lumbar disc, Chronic low back pain with sciatica, sciatica laterality unspecified, unspecified back pain laterality       * Notice:  This list has 2 medication(s) that are the same as other medications prescribed for you. Read the directions carefully, and ask your  doctor or other care provider to review them with you.

## 2017-09-18 NOTE — Clinical Note
Please abstract the following data from this visit with this patient into the appropriate field in Epic:  Pap smear done on this date: 1/2017 (approximately), by this group: [facility], results were normal per patient.

## 2017-09-18 NOTE — PROGRESS NOTES
"  SUBJECTIVE:   Maris Wagner is a 42 year old female who presents to clinic today for the following health issues:    Chief Complaint   Patient presents with     Gastrophageal Reflux     Discuss use of omeprazole.     Arthritis     Arthritis both hands, all fingers are swollen and inflamed.  Decreased ROM.  Red, maybe seem a little bruised.  Worse the past week.     Flu Shot     declined     Health Maintenance     pap done in [place], 1/2017, normal.  Sent for abstracting.         Medication Followup of omeprazole    Taking Medication as prescribed: yes    Side Effects:  None    Medication Helping Symptoms:  Yes    Discuss long term use of medication.    Patient states been on 20 mg daily for many years; increased to 40 mg for last 6 months.  Patient states she ha seen GI before with EGD done ([facility]).           Musculoskeletal problem/pain      Duration: \"years\" per patient     Description  Location: multiple finger joints  Migratory pain of different finger joints weeks at a time.  Patient reports morning stiffness that improves as the day progresses.    Intensity:  mild, moderate    Accompanying signs and symptoms: swelling and redness    History  Previous similar problem: YES- recurrent   Previous evaluation:  none    Precipitating or alleviating factors:  Trauma or overuse: no   Aggravating factors include: none  Therapies tried and outcome: topical analgesic OTC - no relief  Patient states prednisone given by ER provider in past that reklieved pain temporarily.  No previous work up otherwise.    Problem list and histories reviewed & adjusted, as indicated.  Additional history: as documented    Patient Active Problem List   Diagnosis     CARDIOVASCULAR SCREENING; LDL GOAL LESS THAN 130     Ileus, postoperative (H)     Spinal stenosis of lumbar region     Anemia     Gastroesophageal reflux disease     Irritable bowel syndrome     Seasonal allergies     Obesity     S/P lumbar discectomy     Chronic back pain "     Recurrent herniation of lumbar disc     Herniation of lumbar intervertebral disc without myelopathy     Past Surgical History:   Procedure Laterality Date     ARTHROSCOPY ANKLE, OPEN REPAIR LIGAMENT, COMBINED Right 8/3/2017    Procedure: COMBINED ARTHROSCOPY ANKLE, OPEN REPAIR LIGAMENT;  Right Ankle Arthroscopic Evaluation and Debridement,Lateral Ligament Repair,Fracture Evaluation, Open Reduction Internal Fixation ;  Surgeon: Clint Simon DPM;  Location: [facility]     OPEN REDUCTION INTERNAL FIXATION ANKLE Right 8/3/2017    Procedure: OPEN REDUCTION INTERNAL FIXATION ANKLE;;  Surgeon: Clint Simon DPM;  Location: [facility]     REMOVE FOREIGN BODY FINGER Left 3/28/2017    Procedure: REMOVE FOREIGN BODY FINGER;  Surgeon: Tabby Chan MD;  Location: [facility] OR     TUBAL LIGATION         Social History   Substance Use Topics     Smoking status: Current Every Day Smoker     Packs/day: 0.50     Years: 25.00     Types: Cigarettes     Smokeless tobacco: Never Used     Alcohol use Yes      Comment: rare     Family History   Problem Relation Age of Onset     DIABETES Mother      pre diabetes     Arthritis Mother      Hypertension Father      CANCER Maternal Grandmother      CANCER Maternal Grandfather      CANCER Paternal Grandmother      CANCER Paternal Grandfather      mesothelioma         Current Outpatient Prescriptions   Medication Sig Dispense Refill     predniSONE (DELTASONE) 20 MG tablet Take 3 tabs (60 mg) by mouth daily x 3 days, 2 tabs (40 mg) daily x 3 days, 1 tab (20 mg) daily x 3 days, then 1/2 tab (10 mg) x 3 days. 20 tablet 0     omeprazole (PRILOSEC) 40 MG capsule Take 1 capsule (40 mg) by mouth every morning 90 capsule 0     pregabalin (LYRICA) 75 MG capsule Take 75 mg by mouth 2 times daily       famotidine (PEPCID) 40 MG tablet Take 1 tablet (40 mg) by mouth At Bedtime 90 tablet 0     albuterol (PROAIR HFA/PROVENTIL HFA/VENTOLIN HFA) 108 (90 BASE) MCG/ACT Inhaler Inhale 2 puffs into the  "lungs every 6 hours       pregabalin (LYRICA) 50 MG capsule Take 1 capsule (50 mg) by mouth daily (Patient not taking: Reported on 9/18/2017) 90 capsule 0     hydrOXYzine (ATARAX) 25 MG tablet Take 1 tablet (25 mg) by mouth every 4 hours as needed for itching (and nausea & pain) (Patient not taking: Reported on 9/7/2017) 44 tablet 0     oxyCODONE-acetaminophen (PERCOCET) 7.5-325 MG per tablet Take 2 tablets by mouth every 4 hours as needed for moderate to severe pain maximum 14 tablet(s) per day (Patient not taking: Reported on 9/7/2017) 44 tablet 0     IBUPROFEN PO Take 600 mg by mouth every 6 hours as needed for moderate pain       Allergies   Allergen Reactions     Nka [No Known Allergies]          Reviewed and updated as needed this visit by clinical staffTobacco  Allergies  Meds  Problems  Med Hx  Surg Hx  Fam Hx  Soc Hx        Reviewed and updated as needed this visit by Provider  Allergies  Meds  Problems         ROS:  C: NEGATIVE for fever, chills, change in weight  I: NEGATIVE for worrisome rashes, moles or lesions  E: NEGATIVE for vision changes or irritation  E/M: NEGATIVE for ear, mouth and throat problems  R: NEGATIVE for significant cough or SOB  CV: NEGATIVE for chest pain, palpitations or peripheral edema  GI: NEGATIVE for nausea, abdominal pain, heartburn, or change in bowel habits  : NEGATIVE for frequency, dysuria, or hematuria  MUSCULOSKELETAL:see above  N: NEGATIVE for weakness, dizziness or paresthesias  H: NEGATIVE for bleeding problems    OBJECTIVE:                                                    /84 (BP Location: Left arm, Patient Position: Chair, Cuff Size: Adult Regular)  Pulse 89  Temp 98.9  F (37.2  C) (Tympanic)  Ht 5' 5\" (1.651 m)  Wt 250 lb 4.8 oz (113.5 kg)  BMI 41.65 kg/m2  Body mass index is 41.65 kg/(m^2).  GEN: alert, oriented x 3, NAD  EYES: pink conjunctivae, no icterus  SKIN: no jaundice/rash/pallor  HANDS: trace swelling of the fingers bilaterally " with various IPJs TTP, full range of motion bilaterally  EXT: no other joint tenderness or swelling today  ABD: no TTP  Diagnostic test results:  Diagnostic Test Results:  none        ASSESSMENT/PLAN:                                                      ICD-10-CM    1. Gastroesophageal reflux disease without esophagitis K21.9 famotidine (PEPCID) 40 MG tablet     Basic metabolic panel     Vitamin B12     CBC with platelets  Discussed long term effects of PPI.  Discussed with patient possible approaches: patient concurred with discontinuing PPI and using famotidine.  Check labs as above; treat accrodingly.  GERD precautions and prevention tips discussed in detail.  Return precautions discussed and given to patient.     2. Multiple joint pain M25.50 CRP inflammation     Erythrocyte sedimentation rate auto     Rheumatoid factor     Anti Nuclear Wandy IgG by IFA with Reflex     Uric acid     Patient's reported symptoms are suspect for RA.  Other autoimmune illnesses possible too.  Patient concurred with the above tests.  Depending on result, can try oral steroid.  Refer to rheum if positive for autoimmune disorder, or if continues to have multiple joint involvement.  Return precautions discussed and given to patient.                                         Follow up with Provider - depending on ersults or 1 month   Patient Instructions     Start Famotidine 40 mg daily.  Hold omeprazole; you may take this for 2-3 weeks if you have reflux flare with famotidine.    Go to Clinic B now for your blood draw.  You will be contacted in 1-2 days for results of your lab tests.    Thank you for choosing [facility]  You may be receiving a survey in the mail from [employer] regarding your visit today.  Please take a few minutes to complete and return the survey to let us know how we are doing.      If you have questions or concerns, please contact us via [employer] or you can contact your care team at [phone].    Our Clinic  hours are:  Monday 6:40 am  to 7:00 pm  Tuesday -Friday 6:40 am to 5:00 pm    The [place] outpatient lab hours are:  Monday - Friday 6:10 am to 4:45 pm  Saturdays 7:00 am to 11:00 am  Appointments are required, call [phone]    If you have clinical questions after hours or would like to schedule an appointment,  call the clinic at [phone].    Tips to Control Acid Reflux    To control acid reflux, you ll need to make some basic diet and lifestyle changes. The simple steps outlined below may be all you ll need to ease discomfort.  Watch what you eat    Avoid fatty foods and spicy foods.    Eat fewer acidic foods, such as citrus and tomato-based foods. These can increase symptoms.    Limit drinking alcohol, caffeine, and fizzy beverages. All increase acid reflux.    Try limiting chocolate, peppermint, and spearmint. These can worsen acid reflux in some people.  Watch when you eat    Avoid lying down for 3 hours after eating.    Do not snack before going to bed.  Raise your head  Raising your head and upper body by 4 to 6 inches helps limit reflux when you re lying down. Put blocks under the head of your bed frame to raise it.  Other changes    Lose weight, if you need to    Don t exercise near bedtime    Avoid tight-fitting clothes    Limit aspirin and ibuprofen    Stop smoking   Date Last Reviewed: 7/1/2016 2000-2017 [employer]. [address] [place] [address], [place] [address]. All rights reserved. This information is not intended as a substitute for professional medical care. Always follow your healthcare professional's instructions.        Arthralgia    Arthralgia is the term for pain in or around the joint. It is a symptom, not a disease. This pain may involve one or more joints. In some cases, the pain moves from joint to joint.  There are many causes for joint pain. These include:    Injury    Osteoarthritis (wearing out of the joint surface)    Gout (inflammation of the joint due to crystals  in the joint fluid)    Infection inside the joint      Bursitis (inflammation of the fluid-filled sacs around the joint)    Autoimmune disorders such as rheumatoid arthritis or lupus    Tendonitis (inflammation of chords that attach muscle to bone)  Home care    Rest the involved joint(s) until your symptoms improve.     You may be prescribed pain medicine. If none is prescribed, you may use acetaminophen or ibuprofen to control pain and inflammation.  Follow-up care  Follow up with your healthcare provider or as advised.  When to seek medical advice  Contact your healthcare provider right away if any of the following occurs:    Pain, swelling, or redness of joint increases    Pain worsens or recurs after a period of improvement    Pain moves to other joints    You cannot bear weight on the affected joint     You cannot move the affected joint    Joint appears deformed    New rash appears    Fever of 100.4 F (38 C) or higher, or as directed by your healthcare provider  Date Last Reviewed: 3/1/2017    9273-3138 [employer]. [address] [place] [address]. All rights reserved. This information is not intended as a substitute for professional medical care. Always follow your healthcare professional's instructions.            Arsh Alvarez MD  [facility]

## 2017-09-18 NOTE — TELEPHONE ENCOUNTER
Her specialists may prescribe the pregabalin, and any other medication they see appropriate for the patient's condition.

## 2017-09-18 NOTE — PATIENT INSTRUCTIONS
Start Famotidine 40 mg daily.  Hold omeprazole; you may take this for 2-3 weeks if you have reflux flare with famotidine.    Go to [facility] now for your blood draw.  You will be contacted in 1-2 days for results of your lab tests.    Thank you for choosing [facility].  You may be receiving a survey in the mail from [employer] regarding your visit today.  Please take a few minutes to complete and return the survey to let us know how we are doing.      If you have questions or concerns, please contact us via [employer] or you can contact your care team at [phone].    Our Clinic hours are:  Monday 6:40 am  to 7:00 pm  Tuesday -Friday 6:40 am to 5:00 pm    The [place] outpatient lab hours are:  Monday - Friday 6:10 am to 4:45 pm  Saturdays 7:00 am to 11:00 am  Appointments are required, call [phone]    If you have clinical questions after hours or would like to schedule an appointment,  call the clinic at [phone].    Tips to Control Acid Reflux    To control acid reflux, you ll need to make some basic diet and lifestyle changes. The simple steps outlined below may be all you ll need to ease discomfort.  Watch what you eat    Avoid fatty foods and spicy foods.    Eat fewer acidic foods, such as citrus and tomato-based foods. These can increase symptoms.    Limit drinking alcohol, caffeine, and fizzy beverages. All increase acid reflux.    Try limiting chocolate, peppermint, and spearmint. These can worsen acid reflux in some people.  Watch when you eat    Avoid lying down for 3 hours after eating.    Do not snack before going to bed.  Raise your head  Raising your head and upper body by 4 to 6 inches helps limit reflux when you re lying down. Put blocks under the head of your bed frame to raise it.  Other changes    Lose weight, if you need to    Don t exercise near bedtime    Avoid tight-fitting clothes    Limit aspirin and ibuprofen    Stop smoking   Date Last Reviewed: 7/1/2016 2000-2017 [employer]. [address] [place] [address]. All rights reserved. This information is not intended as a substitute for professional medical care. Always follow your healthcare professional's instructions.        Arthralgia    Arthralgia is the term for pain in or around the joint. It is a symptom, not a disease. This pain may involve one or more joints. In some cases, the pain moves from joint to joint.  There are many causes for joint pain. These include:    Injury    Osteoarthritis (wearing out of the joint surface)    Gout (inflammation of the joint due to crystals in the joint fluid)    Infection inside the joint      Bursitis (inflammation of the fluid-filled sacs around the joint)    Autoimmune disorders such as rheumatoid arthritis or lupus    Tendonitis (inflammation of chords that attach muscle to bone)  Home care    Rest the involved joint(s) until your symptoms improve.     You may be prescribed pain medicine. If none is prescribed, you may use acetaminophen or ibuprofen to control pain and inflammation.  Follow-up care  Follow up with your healthcare provider or as advised.  When to seek medical advice  Contact your healthcare provider right away if any of the following occurs:    Pain, swelling, or redness of joint increases    Pain worsens or recurs after a period of improvement    Pain moves to other joints    You cannot bear weight on the affected joint     You cannot move the affected joint    Joint appears deformed    New rash appears    Fever of 100.4 F (38 C) or higher, or as directed by your healthcare provider  Date Last Reviewed: 3/1/2017    [id number] [employer]. [address] [place] [address]. All rights reserved. This information is not intended as a substitute for professional medical care. Always follow your healthcare professional's instructions.

## 2017-09-19 LAB — RHEUMATOID FACT SER NEPH-ACNC: 360 IU/ML (ref 0–20)

## 2017-09-19 RX ORDER — PREDNISONE 20 MG/1
TABLET ORAL
Qty: 20 TABLET | Refills: 0 | Status: SHIPPED | OUTPATIENT
Start: 2017-09-19 | End: 2017-10-10

## 2017-09-19 ASSESSMENT — ANXIETY QUESTIONNAIRES: GAD7 TOTAL SCORE: 3

## 2017-09-20 ENCOUNTER — ALLIED HEALTH/NURSE VISIT (OUTPATIENT)
Dept: FAMILY MEDICINE | Facility: CLINIC | Age: 42
End: 2017-09-20
Payer: COMMERCIAL

## 2017-09-20 ENCOUNTER — TELEPHONE (OUTPATIENT)
Dept: FAMILY MEDICINE | Facility: CLINIC | Age: 42
End: 2017-09-20

## 2017-09-20 DIAGNOSIS — M05.741 RHEUMATOID ARTHRITIS INVOLVING BOTH HANDS WITH POSITIVE RHEUMATOID FACTOR (H): ICD-10-CM

## 2017-09-20 DIAGNOSIS — D64.9 ANEMIA: Primary | ICD-10-CM

## 2017-09-20 DIAGNOSIS — M05.742 RHEUMATOID ARTHRITIS INVOLVING BOTH HANDS WITH POSITIVE RHEUMATOID FACTOR (H): ICD-10-CM

## 2017-09-20 LAB
ANA PAT SER IF-IMP: ABNORMAL
ANA PAT SER IF-IMP: ABNORMAL
ANA SER QL IF: POSITIVE
ANA TITR SER IF: >1280 {TITER}
ANA TITR SER IF: >1280 {TITER}

## 2017-09-20 PROCEDURE — 99207 ZZC NO CHARGE NURSE ONLY: CPT

## 2017-09-20 PROCEDURE — 96372 THER/PROPH/DIAG INJ SC/IM: CPT

## 2017-09-20 RX ORDER — CYANOCOBALAMIN 1000 UG/ML
1 INJECTION, SOLUTION INTRAMUSCULAR; SUBCUTANEOUS WEEKLY
Qty: 4 ML | Refills: 0 | OUTPATIENT
Start: 2017-09-20 | End: 2018-02-08

## 2017-09-20 NOTE — TELEPHONE ENCOUNTER
"Notes Recorded by Arsh Alvarez MD on 9/19/2017 at 3:28 PM  Please call patient.  Her lab tests shows elevated rheumatoid factor, sed rate (marker for inflammation), and low vitamin B12 and WBC level.  She has rheumatoid arthritis.  A referral to rheumatology is placed. Please give instructions to schedule.  Start prednisone taper.  Sent Rx to pharmacy.  Patient has B12 deficiency with anemia. With her long term use of omeprazole, this could be one of the causes.  Start B12 1000 mcg per week injections IM x 4 weeks then check B12 levels after 4th dose. CBC and reticulocyte count to be rechecked after 2 months. Labs have been ordered.  Patient to stop omeprazole as per visit plans.  Patient to take famotidine for GERD as prescribed.    Entered the b12 per Dr Alvarez's orders in the result notes.     She is also asking for a different rheumatology referral from Dr Alvarez, as she isn't able to get in to Dr Babb until November 13th (has appt) and wants to be seen sooner.   Dr Alvarez? Rheum?  She says she \"has to have a referral from Dr Alvarez. \"    Claribel Subramanian RNC      "

## 2017-09-20 NOTE — TELEPHONE ENCOUNTER
Pt was in to see dr sy and he referred her to see dr Babb and pt is not able to get in till Nov. She can't wait that long and would like other name. Pt would also like a call when dr Sy puts in for her B12 shots     Mayi Reyes CSS

## 2017-09-21 RX ORDER — CYANOCOBALAMIN 1000 UG/ML
1 INJECTION, SOLUTION INTRAMUSCULAR; SUBCUTANEOUS WEEKLY
Qty: 4 ML | Refills: 0 | OUTPATIENT
Start: 2017-09-21 | End: 2018-02-08

## 2017-09-25 PROBLEM — M05.742 RHEUMATOID ARTHRITIS INVOLVING BOTH HANDS WITH POSITIVE RHEUMATOID FACTOR (H): Status: ACTIVE | Noted: 2017-09-25

## 2017-09-25 PROBLEM — M05.741 RHEUMATOID ARTHRITIS INVOLVING BOTH HANDS WITH POSITIVE RHEUMATOID FACTOR (H): Status: ACTIVE | Noted: 2017-09-25

## 2017-09-25 NOTE — TELEPHONE ENCOUNTER
Left message on answering machine for patient to call back.  She does have a rheum appt in October now,   Not sure if that is Br[facility]?   Claribel Subramanian RNC

## 2017-09-27 ENCOUNTER — ALLIED HEALTH/NURSE VISIT (OUTPATIENT)
Dept: FAMILY MEDICINE | Facility: CLINIC | Age: 42
End: 2017-09-27
Payer: COMMERCIAL

## 2017-09-27 DIAGNOSIS — D64.9 ANEMIA: Primary | ICD-10-CM

## 2017-09-27 PROCEDURE — 96372 THER/PROPH/DIAG INJ SC/IM: CPT

## 2017-09-27 PROCEDURE — 99207 ZZC NO CHARGE NURSE ONLY: CPT

## 2017-10-04 ENCOUNTER — ALLIED HEALTH/NURSE VISIT (OUTPATIENT)
Dept: FAMILY MEDICINE | Facility: CLINIC | Age: 42
End: 2017-10-04
Payer: COMMERCIAL

## 2017-10-04 DIAGNOSIS — D51.8 OTHER VITAMIN B12 DEFICIENCY ANEMIA: ICD-10-CM

## 2017-10-04 PROCEDURE — 96372 THER/PROPH/DIAG INJ SC/IM: CPT

## 2017-10-04 PROCEDURE — 99207 ZZC NO CHARGE LOS: CPT

## 2017-10-10 ENCOUNTER — OFFICE VISIT (OUTPATIENT)
Dept: FAMILY MEDICINE | Facility: CLINIC | Age: 42
End: 2017-10-10
Payer: COMMERCIAL

## 2017-10-10 VITALS
HEIGHT: 65 IN | BODY MASS INDEX: 41.92 KG/M2 | DIASTOLIC BLOOD PRESSURE: 68 MMHG | TEMPERATURE: 98 F | WEIGHT: 251.6 LBS | HEART RATE: 78 BPM | OXYGEN SATURATION: 96 % | SYSTOLIC BLOOD PRESSURE: 123 MMHG

## 2017-10-10 DIAGNOSIS — E53.8 VITAMIN B12 DEFICIENCY (NON ANEMIC): ICD-10-CM

## 2017-10-10 DIAGNOSIS — M05.742 RHEUMATOID ARTHRITIS INVOLVING BOTH HANDS WITH POSITIVE RHEUMATOID FACTOR (H): ICD-10-CM

## 2017-10-10 DIAGNOSIS — J98.01 ACUTE BRONCHOSPASM: Primary | ICD-10-CM

## 2017-10-10 DIAGNOSIS — J06.9 VIRAL URI WITH COUGH: ICD-10-CM

## 2017-10-10 DIAGNOSIS — M05.741 RHEUMATOID ARTHRITIS INVOLVING BOTH HANDS WITH POSITIVE RHEUMATOID FACTOR (H): ICD-10-CM

## 2017-10-10 PROCEDURE — 99214 OFFICE O/P EST MOD 30 MIN: CPT | Mod: 25 | Performed by: FAMILY MEDICINE

## 2017-10-10 PROCEDURE — 96372 THER/PROPH/DIAG INJ SC/IM: CPT | Performed by: FAMILY MEDICINE

## 2017-10-10 RX ORDER — HYDROCODONE BITARTRATE AND ACETAMINOPHEN 5; 325 MG/1; MG/1
1 TABLET ORAL EVERY 6 HOURS PRN
Qty: 20 TABLET | Refills: 0 | Status: SHIPPED | OUTPATIENT
Start: 2017-10-10 | End: 2018-02-08

## 2017-10-10 RX ORDER — NAPROXEN SODIUM 220 MG
220 TABLET ORAL 3 TIMES DAILY PRN
COMMUNITY
Start: 2017-10-10 | End: 2018-02-08

## 2017-10-10 RX ORDER — PREDNISONE 20 MG/1
TABLET ORAL
Qty: 20 TABLET | Refills: 0 | Status: SHIPPED | OUTPATIENT
Start: 2017-10-10 | End: 2018-02-08

## 2017-10-10 NOTE — NURSING NOTE
"Chief Complaint   Patient presents with     Medication Request     Pt here requesting something for pain until she sees rheumatology on 10/26/17.     Cough     Pt has cold symptoms which started yesterday.       Initial Temp 98  F (36.7  C) (Tympanic)  Ht 5' 5\" (1.651 m)  Wt 251 lb 9.6 oz (114.1 kg)  SpO2 96%  BMI 41.87 kg/m2 Estimated body mass index is 41.87 kg/(m^2) as calculated from the following:    Height as of this encounter: 5' 5\" (1.651 m).    Weight as of this encounter: 251 lb 9.6 oz (114.1 kg).  Medication Reconciliation: complete  Louise Lee CMA    "

## 2017-10-10 NOTE — PROGRESS NOTES
SUBJECTIVE:   Maris Wagner is a 42 year old female who presents to clinic today for the following health issues:  Chief Complaint   Patient presents with     Medication Request     Pt here requesting something for pain until she sees rheumatology on 10/26/17.     Cough     Pt has cold symptoms which started yesterday.     ENT Symptoms             Symptoms: cc Present Absent Comment   Fever/Chills  x     Fatigue  x     Muscle Aches  x     Eye Irritation   x    Sneezing   x    Nasal Ritchie/Drg x      Sinus Pressure/Pain x      Loss of smell  x     Dental pain   x    Sore Throat  x     Swollen Glands   x    Ear Pain/Fullness x   left   Cough x      Wheeze x      Chest Pain x      Shortness of breath x      Rash       Other         Symptom duration:  started yesterday   Symptom severity:  moderate   Treatments tried:  albuterol, robitussin , nyquil, tylenol   Contacts:  family     Verified above history with patient.        Patient has been diagnosed with rheumatoid arthritis with positive RF and KIERAN.  Patient has completed 10 days of tapering prednisone - with partial improvement but when it was completed, pain recurred or got worse.  Seeing rheumatology in 2 weeks.    Problem list and histories reviewed & adjusted, as indicated.  Additional history: as documented    Patient Active Problem List   Diagnosis     CARDIOVASCULAR SCREENING; LDL GOAL LESS THAN 130     Ileus, postoperative (H)     Spinal stenosis of lumbar region     Anemia     Gastroesophageal reflux disease     Irritable bowel syndrome     Seasonal allergies     Obesity     S/P lumbar discectomy     Chronic back pain     Recurrent herniation of lumbar disc     Herniation of lumbar intervertebral disc without myelopathy     Rheumatoid arthritis involving both hands with positive rheumatoid factor (H)     Past Surgical History:   Procedure Laterality Date     ARTHROSCOPY ANKLE, OPEN REPAIR LIGAMENT, COMBINED Right 8/3/2017    Procedure: COMBINED ARTHROSCOPY  ANKLE, OPEN REPAIR LIGAMENT;  Right Ankle Arthroscopic Evaluation and Debridement,Lateral Ligament Repair,Fracture Evaluation, Open Reduction Internal Fixation ;  Surgeon: Clint Simon DPM;  Location: [facility]     OPEN REDUCTION INTERNAL FIXATION ANKLE Right 8/3/2017    Procedure: OPEN REDUCTION INTERNAL FIXATION ANKLE;;  Surgeon: Clint Simon DPM;  Location: [facility]     REMOVE FOREIGN BODY FINGER Left 3/28/2017    Procedure: REMOVE FOREIGN BODY FINGER;  Surgeon: Tabby Chan MD;  Location: [facility] OR     TUBAL LIGATION         Social History   Substance Use Topics     Smoking status: Current Every Day Smoker     Packs/day: 0.50     Years: 25.00     Types: Cigarettes     Smokeless tobacco: Never Used     Alcohol use Yes      Comment: rare     Family History   Problem Relation Age of Onset     DIABETES Mother      pre diabetes     Arthritis Mother      Hypertension Father      CANCER Maternal Grandmother      CANCER Maternal Grandfather      CANCER Paternal Grandmother      CANCER Paternal Grandfather      mesothelioma         Current Outpatient Prescriptions   Medication Sig Dispense Refill     HYDROcodone-acetaminophen (NORCO) 5-325 MG per tablet Take 1 tablet by mouth every 6 hours as needed for moderate to severe pain 20 tablet 0     naproxen sodium (ANAPROX) 220 MG tablet Take 1 tablet (220 mg) by mouth 3 times daily as needed for moderate pain       predniSONE (DELTASONE) 20 MG tablet Take 3 tabs (60 mg) by mouth daily x 3 days, 2 tabs (40 mg) daily x 3 days, 1 tab (20 mg) daily x 3 days, then 1/2 tab (10 mg) x 3 days. 20 tablet 0     cyanocobalamin (VITAMIN B12) 1000 MCG/ML injection Inject 1 mL (1,000 mcg) into the muscle once a week 4 mL 0     pregabalin (LYRICA) 75 MG capsule Take 75 mg by mouth 2 times daily       famotidine (PEPCID) 40 MG tablet Take 1 tablet (40 mg) by mouth At Bedtime 90 tablet 0     albuterol (PROAIR HFA/PROVENTIL HFA/VENTOLIN HFA) 108 (90 BASE) MCG/ACT Inhaler Inhale 2  "puffs into the lungs every 6 hours       cyanocobalamin (VITAMIN B12) 1000 MCG/ML injection Inject 1 mL (1,000 mcg) into the muscle once a week IM x 4 weeks then check B12 levels after 4th dose 4 mL 0     [DISCONTINUED] pregabalin (LYRICA) 50 MG capsule Take 1 capsule (50 mg) by mouth daily (Patient not taking: Reported on 9/18/2017) 90 capsule 0     Allergies   Allergen Reactions     Nka [No Known Allergies]          Reviewed and updated as needed this visit by clinical staffTobacco  Allergies  Meds  Problems  Med Hx  Surg Hx  Fam Hx  Soc Hx        Reviewed and updated as needed this visit by Provider  Allergies  Meds  Problems         ROS:  C: NEGATIVE for fever, chills or change in weight  I: NEGATIVE for worrisome rashes, moles or lesions  E: NEGATIVE for vision changes or irritation  ENT/MOUTH: see above  RESP:as above  CV: NEGATIVE for chest pain, palpitations or peripheral edema  GI: NEGATIVE for nausea, abdominal pain, heartburn, or change in bowel habits  M: NEGATIVE for significant arthralgias or myalgia    OBJECTIVE:                                                    /68  Pulse 78  Temp 98  F (36.7  C) (Tympanic)  Ht 5' 5\" (1.651 m)  Wt 251 lb 9.6 oz (114.1 kg)  SpO2 96%  BMI 41.87 kg/m2  Body mass index is 41.87 kg/(m^2).  GENERAL: alert and no distress  EYES: pink conjunctivae, no icterus  NECK: mild cervical adenopathy, nontender  HEENT: tympanic membrane intact and pearly bilaterally, nose with mild congestion, no sinus tenderness, throat mildly erythematous, no exudates, no oral ulcers  RESP: fair air entry but equal all fields; no crackles but with mild to moderate end-expiratory wheezing all fields.  CV: regular rates and rhythm, normal S1 S2, no S3 or S4 and no murmur  SKIN:  Good turgor, no rashes  BILAT WRIST/HAND: no swelling/erythema; diffuse TTP of wrists L>R; mild TTP of finger joints bilaterally;  strength slightly decreaesd due to pain and patient's perception of " swelling of IPJ.    Diagnostic test results:  Diagnostic Test Results:  Results for orders placed or performed in visit on 09/18/17   Basic metabolic panel   Result Value Ref Range    Sodium 139 133 - 144 mmol/L    Potassium 3.8 3.4 - 5.3 mmol/L    Chloride 108 94 - 109 mmol/L    Carbon Dioxide 23 20 - 32 mmol/L    Anion Gap 8 3 - 14 mmol/L    Glucose 93 70 - 99 mg/dL    Urea Nitrogen 16 7 - 30 mg/dL    Creatinine 0.78 0.52 - 1.04 mg/dL    GFR Estimate 81 >60 mL/min/1.7m2    GFR Estimate If Black >90 >60 mL/min/1.7m2    Calcium 8.0 (L) 8.5 - 10.1 mg/dL   Vitamin B12   Result Value Ref Range    Vitamin B12 184 (L) 193 - 986 pg/mL   CBC with platelets   Result Value Ref Range    WBC 3.5 (L) 4.0 - 11.0 10e9/L    RBC Count 4.16 3.8 - 5.2 10e12/L    Hemoglobin 11.5 (L) 11.7 - 15.7 g/dL    Hematocrit 35.2 35.0 - 47.0 %    MCV 85 78 - 100 fl    MCH 27.6 26.5 - 33.0 pg    MCHC 32.7 31.5 - 36.5 g/dL    RDW 15.7 (H) 10.0 - 15.0 %    Platelet Count 209 150 - 450 10e9/L   CRP inflammation   Result Value Ref Range    CRP Inflammation 6.4 0.0 - 8.0 mg/L   Erythrocyte sedimentation rate auto   Result Value Ref Range    Sed Rate 30 (H) 0 - 20 mm/h   Rheumatoid factor   Result Value Ref Range    Rheumatoid Factor 360 (H) <20 IU/mL   Anti Nuclear [place] IgG by IFA with Reflex   Result Value Ref Range    KIERAN interpretation Positive (A) NEG^Negative    KIERAN pattern 1 NUCLEOLAR     KIERAN titer 1 >1280     KIERAN pattern 2 HOMOGENEOUS     KIERAN titer 2 >1280    Uric acid   Result Value Ref Range    Uric Acid 5.7 2.6 - 6.0 mg/dL          ASSESSMENT/PLAN:                                                        ICD-10-CM    1. Acute bronchospasm J98.01 No distress.  Discussed causes, course and treatment. In this case appears to be from viral URI; possible seasonal allergens too.  Albuterol use reinforced.  predniSONE (DELTASONE) 20 MG tablet  Advised return precautions.  Patient was advised to get flu shot in 1-2 weeks; advised flu can trigger  bronchospasm also.  If recurrent, do PFT to diagnose asthma.     2. Viral URI with cough J06.9     B97.89 Discussed usual course of viral infections; self-limited.   Advised to take plenty of oral fluids. Advised adequate rest. General hygiene.   Advised to see doctor again if worsening or with new sx.     3. Rheumatoid arthritis involving both hands with positive rheumatoid factor (H) M05.741 HYDROcodone-acetaminophen (NORCO) 5-325 MG per tablet    M05.742 naproxen sodium (ANAPROX) 220 MG tablet     Persistent moderate to severe pain in patient with positive RF and KIERAN.  Since starting prednisone for the bronchospasm, this can help decrease inflammation further.  Will add limited use Norco for pain relief for now.  NSAID use discussed with patient.  See rheumatology as scheduled in 2 weeks.     4. Vitamin B12 deficiency (non anemic) E53.8 VITAMIN B12 INJ /1000MCG     INJECTION INTRAMUSCULAR OR SUB-Q       Follow up with Provider - 2 weeks with rheum or prn if needed.   Patient Instructions   Your symptoms are likely due to bronchospasm or tightening of the airways due to recent respiratory infection.  Prednisone is started to decrease inflammation.  Use albuterol inhaler as prescribed.  Drink plenty of oral fluids.  If no improvement after 1 week, see doctor again.    Take Hydrocodone-Acetaminophen 5/325 mg 1 tablet orally every 6 hrs as needed for severe pain only. Do not take when driving or operating heavy equipment. This is not expected to be a long term medication for pain.  May take Naproxen 220 mg orally with food every 12 hrs as needed for pain.  Prednisone taper given for your wheezing can help further decrease inflammation in your joints.  See rheumatology as scheduled in 2 weeks.      Arsh Alvarez MD  [facility]

## 2017-10-10 NOTE — PATIENT INSTRUCTIONS
Your symptoms are likely due to bronchospasm or tightening of the airways due to recent respiratory infection.  Prednisone is started to decrease inflammation.  Use albuterol inhaler as prescribed.  Drink plenty of oral fluids.  If no improvement after 1 week, see doctor again.    Take Hydrocodone-Acetaminophen 5/325 mg 1 tablet orally every 6 hrs as needed for severe pain only. Do not take when driving or operating heavy equipment. This is not expected to be a long term medication for pain.  May take Naproxen 220 mg orally with food every 12 hrs as needed for pain.  Prednisone taper given for your wheezing can help further decrease inflammation in your joints.  See rheumatology as scheduled in 2 weeks.

## 2017-10-10 NOTE — MR AVS SNAPSHOT
After Visit Summary   10/10/2017    Maris Wagner    MRN: [id number]           Patient Information     Date Of Birth          1975        Visit Information        Provider Department      10/10/2017 11:00 AM Arsh Alvarez MD [facility]        Today's Diagnoses     Acute bronchospasm    -  1    Viral URI with cough        Rheumatoid arthritis involving both hands with positive rheumatoid factor (H)          Care Instructions    Your symptoms are likely due to bronchospasm or tightening of the airways due to recent respiratory infection.  Prednisone is started to decrease inflammation.  Use albuterol inhaler as prescribed.  Drink plenty of oral fluids.  If no improvement after 1 week, see doctor again.    Take Hydrocodone-Acetaminophen 5/325 mg 1 tablet orally every 6 hrs as needed for severe pain only. Do not take when driving or operating heavy equipment. This is not expected to be a long term medication for pain.  May take Naproxen 220 mg orally with food every 12 hrs as needed for pain.  Prednisone taper given for your wheezing can help further decrease inflammation in your joints.  See rheumatology as scheduled in 2 weeks.          Follow-ups after your visit        Follow-up notes from your care team     Return in about 1 week (around 10/17/2017), or if symptoms worsen or fail to improve.      Your next 10 appointments already scheduled     Oct 11, 2017  8:30 AM CDT   Nurse Only with [facility] FP/IM CMA/LPN   [facility]    [address] [address]   [phone]            Oct 18, 2017  8:30 AM CDT   Nurse Only with [place] FP/IM CMA/LPN   [facility]    [address] [address]   [phone]            Oct 26, 2017  8:20 AM CDT   New Visit with Fransisco [place] MD   [facility]    [address] [place] [address]  [place]  "[place] [address]   [phone]            Nov 13, 2017  8:45 AM CST   New Visit with French Babb MD   [facility]    [address] [facility] [address] [phone]              Who to contact     If you have questions or need follow up information about today's clinic visit or your schedule please contact [facility] directly at [phone].  Normal or non-critical lab and imaging results will be communicated to you by Crackhart, letter or phone within 4 business days after the clinic has received the results. If you do not hear from us within 7 days, please contact the clinic through Format Dynamicst or phone. If you have a critical or abnormal lab result, we will notify you by phone as soon as possible.  Submit refill requests through Labcyte or call your pharmacy and they will forward the refill request to us. Please allow 3 business days for your refill to be completed.          Additional Information About Your Visit        Crackhart Information     Labcyte gives you secure access to your electronic health record. If you see a primary care provider, you can also send messages to your care team and make appointments. If you have questions, please call your primary care clinic.  If you do not have a primary care provider, please call [phone] and they will assist you.        Care EveryWhere ID     This is your Care EveryWhere ID. This could be used by other organizations to access your [facility] medical records  [id number]        Your Vitals Were     Pulse Temperature Height Pulse Oximetry BMI (Body Mass Index)       78 98  F (36.7  C) (Tympanic) 5' 5\" (1.651 m) 96% 41.87 kg/m2        Blood Pressure from Last 3 Encounters:   10/10/17 123/68   09/18/17 129/84   09/07/17 116/76    Weight from Last 3 Encounters:   10/10/17 251 lb 9.6 oz (114.1 kg)   09/18/17 250 lb 4.8 oz (113.5 kg)   09/07/17 249 lb (112.9 kg)              Today, you had the " following     No orders found for display         Today's Medication Changes          These changes are accurate as of: 10/10/17 11:33 AM.  If you have any questions, ask your nurse or doctor.               Start taking these medicines.        Dose/Directions    HYDROcodone-acetaminophen 5-325 MG per tablet   Commonly known as:  NORCO   Used for:  Rheumatoid arthritis involving both hands with positive rheumatoid factor (H)   Started by:  Arsh Alvarez MD        Dose:  1 tablet   Take 1 tablet by mouth every 6 hours as needed for moderate to severe pain   Quantity:  20 tablet   Refills:  0         These medicines have changed or have updated prescriptions.        Dose/Directions    LYRICA 75 MG capsule   This may have changed:  Another medication with the same name was removed. Continue taking this medication, and follow the directions you see here.   Generic drug:  pregabalin   Changed by:  Arsh Alvarez MD        Dose:  75 mg   Take 75 mg by mouth 2 times daily   Refills:  0         Stop taking these medicines if you haven't already. Please contact your care team if you have questions.     omeprazole 40 MG capsule   Commonly known as:  priLOSEC   Stopped by:  Arsh Alvarez MD                Where to get your medicines      These medications were sent [facility] [address]  [address] [place] [address]     Phone:  [phone]     predniSONE 20 MG tablet         Some of these will need a paper prescription and others can be bought over the counter.  Ask your nurse if you have questions.     Bring a paper prescription for each of these medications     HYDROcodone-acetaminophen 5-325 MG per tablet                Primary Care Provider Office Phone # Fax #    Arsh Alvarez [phone] [address] [facility] [address]        Equal Access to Services     JEFFERY CHIANG AH: Omayra arredondo  naomy Penny, renny perezadaha, qapilyta katate rajansimón, reginald sidneyin hayaaniesha tolentinosolomon brendadiegoglen lee insa. So [facility] [phone].    ATENCIÓN: Si philipla julissa, tiene a cabrales disposición servicios gratuitos de asistencia lingüística. Juan al [phone].    We comply with applicable federal civil rights laws and [place] laws. We do not discriminate on the basis of race, color, national origin, age, disability, sex, sexual orientation, or gender identity.            Thank you!     Thank you for choosing [facility]  for your care. Our goal is always to provide you with excellent care. Hearing back from our patients is one way we can continue to improve our services. Please take a few minutes to complete the written survey that you may receive in the mail after your visit with us. Thank you!             Your Updated Medication List - Protect others around you: Learn how to safely use, store and throw away your medicines at www.disposemymeds.org.          This list is accurate as of: 10/10/17 11:33 AM.  Always use your most recent med list.                   Brand Name Dispense Instructions for use Diagnosis    albuterol 108 (90 BASE) MCG/ACT Inhaler    PROAIR HFA/PROVENTIL HFA/VENTOLIN HFA     Inhale 2 puffs into the lungs every 6 hours        * cyanocobalamin 1000 MCG/ML injection    VITAMIN B12    4 mL    Inject 1 mL (1,000 mcg) into the muscle once a week IM x 4 weeks then check B12 levels after 4th dose    Anemia       * cyanocobalamin 1000 MCG/ML injection    VITAMIN B12    4 mL    Inject 1 mL (1,000 mcg) into the muscle once a week    Other vitamin B12 deficiency anemia       famotidine 40 MG tablet    PEPCID    90 tablet    Take 1 tablet (40 mg) by mouth At Bedtime    Gastroesophageal reflux disease without esophagitis       HYDROcodone-acetaminophen 5-325 MG per tablet    NORCO    20 tablet    Take 1 tablet by mouth every 6 hours as needed for moderate to severe pain    Rheumatoid arthritis  involving both hands with positive rheumatoid factor (H)       LYRICA 75 MG capsule   Generic drug:  pregabalin      Take 75 mg by mouth 2 times daily        naproxen sodium 220 MG tablet    ANAPROX     Take 1 tablet (220 mg) by mouth 3 times daily as needed for moderate pain    Rheumatoid arthritis involving both hands with positive rheumatoid factor (H)       predniSONE 20 MG tablet    DELTASONE    20 tablet    Take 3 tabs (60 mg) by mouth daily x 3 days, 2 tabs (40 mg) daily x 3 days, 1 tab (20 mg) daily x 3 days, then 1/2 tab (10 mg) x 3 days.    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 Read the directions carefully, and ask your doctor or other care provider to review them with you.

## 2017-10-18 DIAGNOSIS — D51.8 OTHER VITAMIN B12 DEFICIENCY ANEMIA: ICD-10-CM

## 2017-10-18 LAB — VIT B12 SERPL-MCNC: 711 PG/ML (ref 193–986)

## 2017-10-18 PROCEDURE — 36415 COLL VENOUS BLD VENIPUNCTURE: CPT | Performed by: FAMILY MEDICINE

## 2017-10-18 PROCEDURE — 82607 VITAMIN B-12: CPT | Performed by: FAMILY MEDICINE

## 2017-10-26 ENCOUNTER — OFFICE VISIT (OUTPATIENT)
Dept: RHEUMATOLOGY | Facility: CLINIC | Age: 42
End: 2017-10-26
Payer: COMMERCIAL

## 2017-10-26 VITALS
OXYGEN SATURATION: 98 % | HEART RATE: 88 BPM | BODY MASS INDEX: 43.02 KG/M2 | HEIGHT: 65 IN | TEMPERATURE: 98.2 F | WEIGHT: 258.2 LBS

## 2017-10-26 DIAGNOSIS — E83.51 HYPOCALCEMIA: ICD-10-CM

## 2017-10-26 DIAGNOSIS — R76.8 ANA POSITIVE: ICD-10-CM

## 2017-10-26 DIAGNOSIS — Z79.899 HIGH RISK MEDICATION USE: ICD-10-CM

## 2017-10-26 DIAGNOSIS — M05.79 RHEUMATOID ARTHRITIS INVOLVING MULTIPLE SITES WITH POSITIVE RHEUMATOID FACTOR (H): Primary | ICD-10-CM

## 2017-10-26 LAB
ALBUMIN SERPL-MCNC: 2.9 G/DL (ref 3.4–5)
ALBUMIN UR-MCNC: NEGATIVE MG/DL
ALP SERPL-CCNC: 69 U/L (ref 40–150)
ALT SERPL W P-5'-P-CCNC: 15 U/L (ref 0–50)
ANION GAP SERPL CALCULATED.3IONS-SCNC: 6 MMOL/L (ref 3–14)
APPEARANCE UR: CLEAR
AST SERPL W P-5'-P-CCNC: 7 U/L (ref 0–45)
BASOPHILS # BLD AUTO: 0 10E9/L (ref 0–0.2)
BASOPHILS NFR BLD AUTO: 0.3 %
BILIRUB SERPL-MCNC: 0.3 MG/DL (ref 0.2–1.3)
BILIRUB UR QL STRIP: NEGATIVE
BUN SERPL-MCNC: 12 MG/DL (ref 7–30)
CALCIUM SERPL-MCNC: 8.2 MG/DL (ref 8.5–10.1)
CHLORIDE SERPL-SCNC: 109 MMOL/L (ref 94–109)
CK SERPL-CCNC: 83 U/L (ref 30–225)
CO2 SERPL-SCNC: 25 MMOL/L (ref 20–32)
COLOR UR AUTO: YELLOW
CREAT SERPL-MCNC: 0.7 MG/DL (ref 0.52–1.04)
CRP SERPL-MCNC: <2.9 MG/L (ref 0–8)
DEPRECATED CALCIDIOL+CALCIFEROL SERPL-MC: 17 UG/L (ref 20–75)
DIFFERENTIAL METHOD BLD: ABNORMAL
EOSINOPHIL # BLD AUTO: 0.1 10E9/L (ref 0–0.7)
EOSINOPHIL NFR BLD AUTO: 2.5 %
ERYTHROCYTE [DISTWIDTH] IN BLOOD BY AUTOMATED COUNT: 16 % (ref 10–15)
ERYTHROCYTE [SEDIMENTATION RATE] IN BLOOD BY WESTERGREN METHOD: 53 MM/H (ref 0–20)
GFR SERPL CREATININE-BSD FRML MDRD: >90 ML/MIN/1.7M2
GLUCOSE SERPL-MCNC: 94 MG/DL (ref 70–99)
GLUCOSE UR STRIP-MCNC: NEGATIVE MG/DL
HBV CORE AB SERPL QL IA: NONREACTIVE
HBV SURFACE AG SERPL QL IA: NONREACTIVE
HCT VFR BLD AUTO: 36.8 % (ref 35–47)
HCV AB SERPL QL IA: NONREACTIVE
HGB BLD-MCNC: 11.8 G/DL (ref 11.7–15.7)
HGB UR QL STRIP: NEGATIVE
KETONES UR STRIP-MCNC: NEGATIVE MG/DL
LEUKOCYTE ESTERASE UR QL STRIP: NEGATIVE
LYMPHOCYTES # BLD AUTO: 1.8 10E9/L (ref 0.8–5.3)
LYMPHOCYTES NFR BLD AUTO: 47.7 %
MCH RBC QN AUTO: 27.7 PG (ref 26.5–33)
MCHC RBC AUTO-ENTMCNC: 32.1 G/DL (ref 31.5–36.5)
MCV RBC AUTO: 86 FL (ref 78–100)
MONOCYTES # BLD AUTO: 0.4 10E9/L (ref 0–1.3)
MONOCYTES NFR BLD AUTO: 11.7 %
NEUTROPHILS # BLD AUTO: 1.4 10E9/L (ref 1.6–8.3)
NEUTROPHILS NFR BLD AUTO: 37.8 %
NITRATE UR QL: NEGATIVE
NON-SQ EPI CELLS #/AREA URNS LPF: ABNORMAL /LPF
PH UR STRIP: 6 PH (ref 5–7)
PLATELET # BLD AUTO: 223 10E9/L (ref 150–450)
POTASSIUM SERPL-SCNC: 4.2 MMOL/L (ref 3.4–5.3)
PROT SERPL-MCNC: 6.6 G/DL (ref 6.8–8.8)
PROT UR-MCNC: 0.14 G/L
PROT/CREAT 24H UR: 0.14 G/G CR (ref 0–0.2)
RBC # BLD AUTO: 4.26 10E12/L (ref 3.8–5.2)
RBC #/AREA URNS AUTO: ABNORMAL /HPF
SODIUM SERPL-SCNC: 140 MMOL/L (ref 133–144)
SOURCE: ABNORMAL
SP GR UR STRIP: 1.02 (ref 1–1.03)
UROBILINOGEN UR STRIP-ACNC: 0.2 EU/DL (ref 0.2–1)
WBC # BLD AUTO: 3.7 10E9/L (ref 4–11)
WBC #/AREA URNS AUTO: ABNORMAL /HPF

## 2017-10-26 PROCEDURE — 86235 NUCLEAR ANTIGEN ANTIBODY: CPT | Performed by: INTERNAL MEDICINE

## 2017-10-26 PROCEDURE — 99204 OFFICE O/P NEW MOD 45 MIN: CPT | Performed by: INTERNAL MEDICINE

## 2017-10-26 PROCEDURE — 82550 ASSAY OF CK (CPK): CPT | Performed by: INTERNAL MEDICINE

## 2017-10-26 PROCEDURE — 86160 COMPLEMENT ANTIGEN: CPT | Performed by: INTERNAL MEDICINE

## 2017-10-26 PROCEDURE — 36415 COLL VENOUS BLD VENIPUNCTURE: CPT | Performed by: INTERNAL MEDICINE

## 2017-10-26 PROCEDURE — 86704 HEP B CORE ANTIBODY TOTAL: CPT | Performed by: INTERNAL MEDICINE

## 2017-10-26 PROCEDURE — G0499 HEPB SCREEN HIGH RISK INDIV: HCPCS | Performed by: INTERNAL MEDICINE

## 2017-10-26 PROCEDURE — 84156 ASSAY OF PROTEIN URINE: CPT | Performed by: INTERNAL MEDICINE

## 2017-10-26 PROCEDURE — 82306 VITAMIN D 25 HYDROXY: CPT | Performed by: INTERNAL MEDICINE

## 2017-10-26 PROCEDURE — 86140 C-REACTIVE PROTEIN: CPT | Performed by: INTERNAL MEDICINE

## 2017-10-26 PROCEDURE — 85025 COMPLETE CBC W/AUTO DIFF WBC: CPT | Performed by: INTERNAL MEDICINE

## 2017-10-26 PROCEDURE — 86225 DNA ANTIBODY NATIVE: CPT | Performed by: INTERNAL MEDICINE

## 2017-10-26 PROCEDURE — 81001 URINALYSIS AUTO W/SCOPE: CPT | Performed by: INTERNAL MEDICINE

## 2017-10-26 PROCEDURE — 85652 RBC SED RATE AUTOMATED: CPT | Performed by: INTERNAL MEDICINE

## 2017-10-26 PROCEDURE — 86200 CCP ANTIBODY: CPT | Performed by: INTERNAL MEDICINE

## 2017-10-26 PROCEDURE — 86803 HEPATITIS C AB TEST: CPT | Performed by: INTERNAL MEDICINE

## 2017-10-26 PROCEDURE — 86618 LYME DISEASE ANTIBODY: CPT | Performed by: INTERNAL MEDICINE

## 2017-10-26 PROCEDURE — 80053 COMPREHEN METABOLIC PANEL: CPT | Performed by: INTERNAL MEDICINE

## 2017-10-26 RX ORDER — FOLIC ACID 1 MG/1
1 TABLET ORAL DAILY
Qty: 100 TABLET | Refills: 3 | Status: SHIPPED | OUTPATIENT
Start: 2017-10-26 | End: 2018-02-08

## 2017-10-26 RX ORDER — PREDNISONE 5 MG/1
TABLET ORAL
Qty: 120 TABLET | Refills: 0 | Status: SHIPPED | OUTPATIENT
Start: 2017-10-26 | End: 2018-02-08

## 2017-10-26 RX ORDER — SYRINGE-NEEDLE,INSULIN,0.5 ML 27GX1/2"
SYRINGE, EMPTY DISPOSABLE MISCELLANEOUS
Qty: 10 EACH | Refills: 3 | Status: SHIPPED | OUTPATIENT
Start: 2017-10-26 | End: 2018-08-23

## 2017-10-26 NOTE — MR AVS SNAPSHOT
After Visit Summary   10/26/2017    Maris Wagner    MRN: [id number]           Patient Information     Date Of Birth          1975        Visit Information        Provider Department      10/26/2017 8:20 AM Fransisco Montana MD [place] [facility] [place]        Today's Diagnoses     Rheumatoid arthritis involving multiple sites with positive rheumatoid factor (H)    -  1    KIERAN positive        Hypocalcemia        High risk medication use          Care Instructions    Dr. Montana s Rheumatology Clinics  Locations Clinic Hours Telephone Number     [facility] [place]  [address]  [place] [address]     Wednesday: 7:20AM - 4:00PM  Thursday:     7:20AM - 4:00PM     Friday:          7:20AM - 11:00AM       To schedule an appointment with  Dr. Montana,  please contact  Specialty [phone]       Rashad Feliciano  [address]  [place] [address]       Monday:       7:20AM - 4:00PM        [facility]  [address] [place] [address]       Tuesday:      7:20AM - 4:00PM          Thank you!    Angelic Bucio CMA              Follow-ups after your visit        Your next 10 appointments already scheduled     2017  8:45 AM CST   New Visit with French Babb MD   [facility]    [address] [address]   [phone]            2017  8:00 AM CST   LAB with [facility] ([facility])    [address] [address]   [phone]           Patient must bring picture ID. Patient should be prepared to give a urine specimen  Please do not eat 10-12 hours before your appointment if you are coming in fasting for labs on lipids, cholesterol, or glucose (sugar). Pregnant women should follow their Care Team instructions. Water with medications is okay. Do not drink coffee or other fluids. If you have concerns about taking  your  medications, please ask at office or if scheduling via Toroleo, send a message by clicking on Secure Messaging, Message Your Care Team.            Dec 18, 2017  8:00 AM CST   LAB with [place] [facility]   [facility]    [address] [address]   [phone]           Patient must bring picture ID. Patient should be prepared to give a urine specimen  Please do not eat 10-12 hours before your appointment if you are coming in fasting for labs on lipids, cholesterol, or glucose (sugar). Pregnant women should follow their Care Team instructions. Water with medications is okay. Do not drink coffee or other fluids. If you have concerns about taking  your medications, please ask at office or if scheduling via Toroleo, send a message by clicking on Secure Messaging, Message Your Care Team.            Jan 22, 2018  8:00 AM CST   LAB with [place] [facility]   [facility]    [address] [address]   [phone]           Patient must bring picture ID. Patient should be prepared to give a urine specimen  Please do not eat 10-12 hours before your appointment if you are coming in fasting for labs on lipids, cholesterol, or glucose (sugar). Pregnant women should follow their Care Team instructions. Water with medications is okay. Do not drink coffee or other fluids. If you have concerns about taking  your medications, please ask at office or if scheduling via Toroleo, send a message by clicking on Secure Messaging, Message Your Care Team.            Jan 25, 2018  8:20 AM CST   Return Visit with Fransisco [place] MD   [facility] [place] ([facility] [place])    [address]  [place] [address]   [phone]              Future tests that were ordered for you today     Open Standing Orders        Priority Remaining Interval Expires Ordered    CBC with platelets differential Routine 2/2 Every 4 Weeks 4/24/2018  "10/26/2017    Creatinine Routine 2/2 Every 4 Weeks 4/24/2018 10/26/2017    Hepatic panel Routine 2/2 Every 4 Weeks 4/24/2018 10/26/2017            Who to contact     If you have questions or need follow up information about today's clinic visit or your schedule please contact [facility] directly at [phone].  Normal or non-critical lab and imaging results will be communicated to you by TRAKLOKhart, letter or phone within 4 business days after the clinic has received the results. If you do not hear from us within 7 days, please contact the clinic through ShowUhowt or phone. If you have a critical or abnormal lab result, we will notify you by phone as soon as possible.  Submit refill requests through Venga or call your pharmacy and they will forward the refill request to us. Please allow 3 business days for your refill to be completed.          Additional Information About Your Visit        TRAKLOKhariTwin Information     Venga gives you secure access to your electronic health record. If you see a primary care provider, you can also send messages to your care team and make appointments. If you have questions, please call your primary care clinic.  If you do not have a primary care provider, please call [phone] and they will assist you.        Care EveryWhere ID     This is your Care EveryWhere ID. This could be used by other organizations to access your [place] medical records  [id number]        Your Vitals Were     Pulse Temperature Height Pulse Oximetry BMI (Body Mass Index)       88 98.2  F (36.8  C) (Oral) 1.651 m (5' 5\") 98% 42.97 kg/m2        Blood Pressure from Last 3 Encounters:   10/10/17 123/68   09/18/17 129/84   09/07/17 116/76    Weight from Last 3 Encounters:   10/26/17 117.1 kg (258 lb 3.2 oz)   10/10/17 114.1 kg (251 lb 9.6 oz)   09/18/17 113.5 kg (250 lb 4.8 oz)              We Performed the Following     CBC with platelets differential     CK total     Complement C3     Complement C4  " "   Comprehensive metabolic panel     CRP inflammation     Cyclic Citrullinated Peptide Antibody IgG     DNA double stranded antibodies     ARIC antibody panel     Erythrocyte sedimentation rate auto     Hepatitis B core antibody     Hepatitis B surface antigen     Hepatitis C Screen Reflex to HCV RNA Quant and Genotype     Lyme Disease Wandy with reflex to WB Serum     Protein  random urine with Creat Ratio     UA with Microscopic reflex to Culture     Vitamin D Deficiency          Today's Medication Changes          These changes are accurate as of: 10/26/17  9:07 AM.  If you have any questions, ask your nurse or doctor.               Start taking these medicines.        Dose/Directions    folic acid 1 MG tablet   Commonly known as:  FOLVITE   Used for:  Rheumatoid arthritis involving multiple sites with positive rheumatoid factor (H), High risk medication use   Started by:  Fransisco Montana MD        Dose:  1 mg   Take 1 tablet (1 mg) by mouth daily   Quantity:  100 tablet   Refills:  3       Insulin Syringe-Needle U-100 27G X 1/2\" 1 ML Misc   Commonly known as:  BD insulin syringe   Used for:  Rheumatoid arthritis involving multiple sites with positive rheumatoid factor (H)   Started by:  Fransisco Montana MD        For once weekly methotrexate administration   Quantity:  10 each   Refills:  3       methotrexate sodium 50 MG/2ML Soln   Used for:  Rheumatoid arthritis involving multiple sites with positive rheumatoid factor (H)   Started by:  Fransisco Montana MD        Dose:  20 mg   Inject 0.8 mLs (20 mg) as directed every 7 days   Quantity:  2 vial   Refills:  3         These medicines have changed or have updated prescriptions.        Dose/Directions    * predniSONE 20 MG tablet   Commonly known as:  DELTASONE   This may have changed:  Another medication with the same name was added. Make sure you understand how and when to take each.   Used for:  Rheumatoid arthritis involving both hands with positive rheumatoid " "factor (H)   Changed by:  Arsh Alvarez MD        Take 3 tabs (60 mg) by mouth daily x 3 days, 2 tabs (40 mg) daily x 3 days, 1 tab (20 mg) daily x 3 days, then 1/2 tab (10 mg) x 3 days.   Quantity:  20 tablet   Refills:  0       * predniSONE 5 MG tablet   Commonly known as:  DELTASONE   This may have changed:  You were already taking a medication with the same name, and this prescription was added. Make sure you understand how and when to take each.   Used for:  Rheumatoid arthritis involving multiple sites with positive rheumatoid factor (H)   Changed by:  Fransisco Montana MD        Prednisone 20mg daily x5days, then 15mg daily x5days, then 10mg daily x5days, then 5mg daily thereafter.   Quantity:  120 tablet   Refills:  0       * Notice:  This list has 2 medication(s) that are the same as other medications prescribed for you. Read the directions carefully, and ask your doctor or other care provider to review them with you.         Where to get your medicines      These medications were sent to [facility] [place] - [address]  [address], [place] [address]    Hours:  [facility] Phone:  [phone]     folic acid 1 MG tablet    Insulin Syringe-Needle U-100 27G X 1/2\" 1 ML Misc    methotrexate sodium 50 MG/2ML Soln    predniSONE 5 MG tablet                Primary Care Provider Office Phone # Fax #    Arsh Alvarez [phone] [address] [address]        Equal Access to Services     [facility] : Hadii aad ku hadasho Soomaali, waaxda luqadaha, qaybta kaalmada adeegyada, reginald butt haybasilio lee . So [facility] [phone].    ATENCIÓN: Si habla español, tiene a cabrales disposición servicios gratuitos de asistencia lingüística. Llame al [phone].    We comply with applicable federal civil rights laws and [place] laws. We do not discriminate on the basis of race, color, " "national origin, age, disability, sex, sexual orientation, or gender identity.            Thank you!     Thank you for choosing [facility]  for your care. Our goal is always to provide you with excellent care. Hearing back from our patients is one way we can continue to improve our services. Please take a few minutes to complete the written survey that you may receive in the mail after your visit with us. Thank you!             Your Updated Medication List - Protect others around you: Learn how to safely use, store and throw away your medicines at www.disposemymeds.org.          This list is accurate as of: 10/26/17  9:07 AM.  Always use your most recent med list.                   Brand Name Dispense Instructions for use Diagnosis    albuterol 108 (90 BASE) MCG/ACT Inhaler    PROAIR HFA/PROVENTIL HFA/VENTOLIN HFA     Inhale 2 puffs into the lungs every 6 hours        * cyanocobalamin 1000 MCG/ML injection    VITAMIN B12    4 mL    Inject 1 mL (1,000 mcg) into the muscle once a week IM x 4 weeks then check B12 levels after 4th dose    Anemia       * cyanocobalamin 1000 MCG/ML injection    VITAMIN B12    4 mL    Inject 1 mL (1,000 mcg) into the muscle once a week    Other vitamin B12 deficiency anemia       famotidine 40 MG tablet    PEPCID    90 tablet    Take 1 tablet (40 mg) by mouth At Bedtime    Gastroesophageal reflux disease without esophagitis       folic acid 1 MG tablet    FOLVITE    100 tablet    Take 1 tablet (1 mg) by mouth daily    Rheumatoid arthritis involving multiple sites with positive rheumatoid factor (H), High risk medication use       HYDROcodone-acetaminophen 5-325 MG per tablet    NORCO    20 tablet    Take 1 tablet by mouth every 6 hours as needed for moderate to severe pain    Rheumatoid arthritis involving both hands with positive rheumatoid factor (H)       Insulin Syringe-Needle U-100 27G X 1/2\" 1 ML Misc    BD insulin syringe    10 each    For once weekly methotrexate " administration    Rheumatoid arthritis involving multiple sites with positive rheumatoid factor (H)       LYRICA 75 MG capsule   Generic drug:  pregabalin      Take 75 mg by mouth 2 times daily        methotrexate sodium 50 MG/2ML Soln     2 vial    Inject 0.8 mLs (20 mg) as directed every 7 days    Rheumatoid arthritis involving multiple sites with positive rheumatoid factor (H)       naproxen sodium 220 MG tablet    ANAPROX     Take 1 tablet (220 mg) by mouth 3 times daily as needed for moderate pain    Rheumatoid arthritis involving both hands with positive rheumatoid factor (H)       * predniSONE 20 MG tablet    DELTASONE    20 tablet    Take 3 tabs (60 mg) by mouth daily x 3 days, 2 tabs (40 mg) daily x 3 days, 1 tab (20 mg) daily x 3 days, then 1/2 tab (10 mg) x 3 days.    Rheumatoid arthritis involving both hands with positive rheumatoid factor (H)       * predniSONE 5 MG tablet    DELTASONE    120 tablet    Prednisone 20mg daily x5days, then 15mg daily x5days, then 10mg daily x5days, then 5mg daily thereafter.    Rheumatoid arthritis involving multiple sites with positive rheumatoid factor (H)       * Notice:  This list has 4 medication(s) that are the same as other medications prescribed for you. Read the directions carefully, and ask your doctor or other care provider to review them with you.

## 2017-10-26 NOTE — NURSING NOTE
"Chief Complaint   Patient presents with     Consult     patient states she has pain in her hands, back and ankles       Initial Pulse 88  Temp 98.2  F (36.8  C) (Oral)  Ht 1.651 m (5' 5\")  Wt 117.1 kg (258 lb 3.2 oz)  SpO2 98%  BMI 42.97 kg/m2 Estimated body mass index is 42.97 kg/(m^2) as calculated from the following:    Height as of this encounter: 1.651 m (5' 5\").    Weight as of this encounter: 117.1 kg (258 lb 3.2 oz).  BP completed using cuff size: large         RAPID3 (0-30) Cumulative Score  18.7          RAPID3 Weighted Score (divide #4 by 3 and that is the weighted score)  6.23         "

## 2017-10-26 NOTE — PATIENT INSTRUCTIONS
Dr. [place] s Rheumatology Clinics  Locations Clinic Hours Telephone Number     [facility]  [address]  [place] [address]     Wednesday: 7:20AM - 4:00PM  Thursday:     7:20AM - 4:00PM     Friday:          7:20AM - 11:00AM       To schedule an appointment with  Dr. [place],  please contact  Specialty [phone]       Rashad Feliciano  [address]  [place] [address]       Monday:       7:20AM - 4:00PM        Rashad Ibarra  [address]  [place] [address]       Tuesday:      7:20AM - 4:00PM          Thank you!    Angelic Bucio CMA

## 2017-10-26 NOTE — PROGRESS NOTES
Rheumatology Clinic Visit      Maris Wagner MRN# [id number]   YOB: 1975 Age: 42 year old      Date of visit: 10/26/17   Referring provider: Dr. Arsh Alvarez  PCP: Dr. Arsh Alvarez    Chief Complaint   Patient presents with:  Consult: patient states she has pain in her hands, back and ankles      Assessment and Plan     1. Rheumatoid Arthritis (, CCP unknown): Dx'd 2017.  Initially presented with symmetric synovitis of the MCPs and PIPs; morning stiffness all day.  We reviewed the dx and tx options.  Start MTX; SQ preferred by patient because she says that she has easy stomach upset.    - Start methotrexate 20mg SQ once every 7 days  - Start folic acid 1mg daily  - Start Prednisone 20mg daily x5days, then 15mg daily x5days, then 10mg daily x5days, then 5mg daily thereafter  - X-rays today: bilateral hands  - Labs today: CBC, CMP, ESR, CRP, Hepatitis B/C, CCP, Lyme ab  - Labs monthly ×2 months: CBC, creatinine, hepatic panel  - Labs 2-3 days prior to the next rheumatology clinic visit: CBC, Creatinine, Hepatic Panel, ESR, CRP    # Methotrexate Risks and Benefits: The risks and benefits of methotrexate were discussed in detail and the patient verbalized understanding.  The risks discussed include, but are not limited to, the risk for hypersensitivity, anaphylaxis, anaphylactoid reactions, infections, bone marrow suppression, renal toxicity, hepatotoxicity, pulmonary toxicity, malignancy, impaired fertility, GI upset, alopecia, and oral and nasal sores.  Folic acid supplementation is recommended during methotrexate therapy to help prevent some of the side effects. Pregnancy prevention and planning was discussed; it is recommended that women of childbearing potential use reliable contraception during therapy.  The risks of taking both methotrexate and alcohol were reviewed; complete alcohol avoidance was discussed.  Routine laboratory monitoring is required during methotrexate therapy. Taking MTX once  weekly, all within a 24 hour period was stressed and the patient verbalized this instruction back to me.  I encouraged reviewing the package insert and asking any questions about the medication.    # Prednisone Risks and Benefits: The risks and benefits of prednisone were discussed in detail and the patient verbalized understanding.  The risks discussed include, but are not limited to, weight gain, fluid retention, impaired wound healing, hyperglycemia, adrenal suppression, GI upset, peptic ulcer, hepatotoxicity, aseptic necrosis of the femoral and humeral heads, osteoporosis, myopathy, tendon rupture (particularly Achilles tendon), ocular changes including an increased intraocular pressure.  I encouraged reviewing the package insert and asking any questions about the medication.      2. KIERAN 1:1280 nucleolar: Also possible to be associated with the arthritis (see #1).  No other symptoms to suggest an KIERAN-associated rheumatologic disorder but given the arthritis will check additional labs as noted below.   - Labs: CBC, CMP, ESR, CRP, dsDNA, C3, C4, APS labs, CK, UA, Uprotein:creatinine    3. Hypocalcemia: check vitamin D  - Lab today: vitamin D    4. Cigarette Smoking:  Encouraged complete cessation and discussed the health benefits.      5. Vaccinations: Vaccinations reviewed with Ms. Wagner.  Risks and benefits of vaccinations were discussed.  - Influenza: refused by patient    Ms. Wagner verbalized agreement with and understanding of the rational for the diagnosis and treatment plan.  All questions were answered to best of my ability and the patient's satisfaction. Ms. Wagner was advised to contact the clinic with any questions that may arise after the clinic visit.      Thank you for involving me in the care of the patient    Return to clinic: 3 months      HPI   Maris Wagner is a 42 year old female with a past medical history significant for postoperative ileus, spinal stenosis of lumbar spine status-post  discectomy, GERD, irritable bowel syndrome, and rheumatoid arthritis who is seen in consultation at the request of Dr. Arsh Alvarez for evaluation of rheumatoid arthritis.    Today, Ms. Wagner reports that she has chronic LBP with multiple L-spine fusion (performed at [facility]).  Now with pain and swelling of the MCPs, PIPs, wrists.  Neuropathy in the bilateral LE due to lower back issues per patient and follows neurosurgery.  For her hands, activity worsens her pain.  Morning stiffness all day that improves with activity but never goes away.  Prednisone tapers, starting with 60mg daily is very effective but symptoms return as soon it is stopped.     Denies fevers, chills, nausea, vomiting, constipation, diarrhea. No abdominal pain. No chest pain/pressure, palpitations, or shortness of breath. No LE swelling. No neck pain. No oral or nasal sores.  No rash. Dry eyes only with allergies.  No dry mouth.  No photosensitivity or photophobia. No eye pain or redness. No history of inflammatory eye disease.  No history of DVT, pulmonary embolism, or miscarriage.   No history of serositis.  No raynaud's phenomenon.  No seizure history. No known renal disorder.      Father: RA    Tobacco: 0.5 ppd  EtOH: no more than 1 drink per month  Drugs: none    ROS   GEN: No fevers, chills, night sweats, or weight change  SKIN: No itching, rashes, sores  HEENT: No epistaxis. No oral or nasal ulcers.  CV: No chest pain, pressure, palpitations, or dyspnea on exertion.  PULM: No SOB, wheeze, cough.  GI: No nausea, vomiting, constipation, diarrhea. No blood in stool. No abdominal pain.  : No blood in urine.  MSK: See HPI.  NEURO: No numbness, tingling, or weakness.  EXT: No LE swelling  PSYCH: Negative    Active Problem List     Patient Active Problem List   Diagnosis     CARDIOVASCULAR SCREENING; LDL GOAL LESS THAN 130     Ileus, postoperative (H)     Spinal stenosis of lumbar region     Anemia     Gastroesophageal reflux disease      Irritable bowel syndrome     Seasonal allergies     Obesity     S/P lumbar discectomy     Chronic back pain     Recurrent herniation of lumbar disc     Herniation of lumbar intervertebral disc without myelopathy     Rheumatoid arthritis involving both hands with positive rheumatoid factor (H)     Past Medical History   No past medical history on file.  Past Surgical History     Past Surgical History:   Procedure Laterality Date     ARTHROSCOPY ANKLE, OPEN REPAIR LIGAMENT, COMBINED Right 8/3/2017    Procedure: COMBINED ARTHROSCOPY ANKLE, OPEN REPAIR LIGAMENT;  Right Ankle Arthroscopic Evaluation and Debridement,Lateral Ligament Repair,Fracture Evaluation, Open Reduction Internal Fixation ;  Surgeon: Clint Simon DPM;  Location: [facility]     OPEN REDUCTION INTERNAL FIXATION ANKLE Right 8/3/2017    Procedure: OPEN REDUCTION INTERNAL FIXATION ANKLE;;  Surgeon: Clint Simon DPM;  Location: [facility]     REMOVE FOREIGN BODY FINGER Left 3/28/2017    Procedure: REMOVE FOREIGN BODY FINGER;  Surgeon: Tabby Chan MD;  Location: [facility] OR     TUBAL LIGATION       Allergy     Allergies   Allergen Reactions     Nka [No Known Allergies]      Current Medication List     Current Outpatient Prescriptions   Medication Sig     cyanocobalamin (VITAMIN B12) 1000 MCG/ML injection Inject 1 mL (1,000 mcg) into the muscle once a week IM x 4 weeks then check B12 levels after 4th dose     pregabalin (LYRICA) 75 MG capsule Take 75 mg by mouth 2 times daily     famotidine (PEPCID) 40 MG tablet Take 1 tablet (40 mg) by mouth At Bedtime     albuterol (PROAIR HFA/PROVENTIL HFA/VENTOLIN HFA) 108 (90 BASE) MCG/ACT Inhaler Inhale 2 puffs into the lungs every 6 hours     HYDROcodone-acetaminophen (NORCO) 5-325 MG per tablet Take 1 tablet by mouth every 6 hours as needed for moderate to severe pain (Patient not taking: Reported on 10/26/2017)     naproxen sodium (ANAPROX) 220 MG tablet Take 1 tablet (220 mg) by mouth 3 times daily as  "needed for moderate pain     predniSONE (DELTASONE) 20 MG tablet Take 3 tabs (60 mg) by mouth daily x 3 days, 2 tabs (40 mg) daily x 3 days, 1 tab (20 mg) daily x 3 days, then 1/2 tab (10 mg) x 3 days. (Patient not taking: Reported on 10/26/2017)     cyanocobalamin (VITAMIN B12) 1000 MCG/ML injection Inject 1 mL (1,000 mcg) into the muscle once a week (Patient not taking: Reported on 10/26/2017)     No current facility-administered medications for this visit.          Social History   See HPI    Family History     Family History   Problem Relation Age of Onset     DIABETES Mother      pre diabetes     Arthritis Mother      Hypertension Father      CANCER Maternal Grandmother      CANCER Maternal Grandfather      CANCER Paternal Grandmother      CANCER Paternal Grandfather      mesothelioma     Father: RA    Physical Exam     Temp Readings from Last 3 Encounters:   10/10/17 98  F (36.7  C) (Tympanic)   09/18/17 98.9  F (37.2  C) (Tympanic)   09/07/17 98.9  F (37.2  C) (Tympanic)     BP Readings from Last 5 Encounters:   10/10/17 123/68   09/18/17 129/84   09/07/17 116/76   08/08/17 (!) 137/93   08/03/17 101/67     Pulse Readings from Last 1 Encounters:   10/10/17 78     Resp Readings from Last 1 Encounters:   08/08/17 18     Estimated body mass index is 41.87 kg/(m^2) as calculated from the following:    Height as of 10/10/17: 1.651 m (5' 5\").    Weight as of 10/10/17: 114.1 kg (251 lb 9.6 oz).    GEN: NAD  HEENT: MMM. No oral lesions. EOMI. Anicteric, noninjected sclera  CV: S1, S2. RRR. No m/r/g.  PULM: CTA bilaterally. No w/c.  ABD: +BS.   MSK: swelling and tenderness to palpation of the bilateral 2nd-5th MCPs and PIPs.  Wrists and elbows tenderness to palpation but without swelling.  Shoulders without swelling or tenderness to palpation.  Bilateral hips nontender to direct palpation. Knees without swelling or tenderness to palpation.  Right ankle with lateral surgical scars that appear to be healing well without " tenderness to palpation or surrounding erythema.  Left ankle without swelling or tenderness to palpation.  MTPs without swelling or tenderness to palpation.    NEURO: UE and LE strengths 5/5 and equal bilaterally.   SKIN: No rash  EXT: No LE edema  PSYCH: Alert. Appropriate.    Labs / Imaging (select studies)     RF/CCP  Recent Labs   Lab Test  09/18/17 1855   RHF  360*     KIERAN  Recent Labs   Lab Test  09/18/17 1855   CARLY  Positive*   ANAP1  NUCLEOLAR   ANAT1  >1280     CBC  Recent Labs   Lab Test  09/18/17   1855  03/14/15   2100  02/03/15   0630  02/01/15   0635  01/31/15   0639   WBC  3.5*  9.9  6.5  10.1  13.7*   RBC  4.16  4.27  3.12*  3.35*  3.74*   HGB  11.5*  12.7  9.5*  10.2*  11.5*   HCT  35.2  39.6  29.8*  32.4*  35.4   MCV  85  93  96  97  95   RDW  15.7*  13.8  12.7  13.3  13.3   PLT  209  300  237  211  212   MCH  27.6  29.7  30.4  30.4  30.7   MCHC  32.7  32.1  31.9  31.5  32.5   NEUTROPHIL   --   60.5   --   75.7  83.5   LYMPH   --   30.2   --   12.5  9.1   MONOCYTE   --   6.3   --   9.1  6.7   EOSINOPHIL   --   2.6   --   2.3  0.4   BASOPHIL   --   0.3   --   0.2  0.1   ANEU   --   6.0   --   7.6  11.4*   ALYM   --   3.0   --   1.3  1.3   RAINA   --   0.6   --   0.9  0.9   AEOS   --   0.3   --   0.2  0.1   ABAS   --   0.0   --   0.0  0.0     CMP  Recent Labs   Lab Test  09/18/17   1855  03/14/15   2100  02/03/15   0630  02/01/15   0635  01/31/15   0639  01/30/15   2025  01/09/14   1041   NA  139  139  139  140  137  132*  138   POTASSIUM  3.8  3.9  3.9  3.8  3.8  3.4  4.1   CHLORIDE  108  107  108  108  104  101  108   CO2  23  25  26  25  23  24  23   ANIONGAP  8  7  5  7  10  7  8   GLC  93  102*  99  98  116*  131*  122*   BUN  16  9  7  9  9  9  16   CR  0.78  0.81  0.66  0.63  0.65  0.70  0.80   GFRESTIMATED  81  79  >90  Non  GFR Calc    >90  Non  GFR Calc    >90  Non  GFR Calc    >90  Non  GFR Calc    81   GFRESTBLACK  >90   >90   GFR Calc    >90   GFR Calc    >90   GFR Calc    >90   GFR Calc    >90   GFR Calc    >90   DEDE  8.0*  8.5  7.6*  7.5*  7.6*  8.5  8.4*   BILITOTAL   --   0.2   --    --    --   0.6  0.6   ALBUMIN   --   3.5   --    --    --   3.3*  4.1   PROTTOTAL   --   7.2   --    --    --   7.5  7.4   ALKPHOS   --   74   --    --    --   50  63   AST   --   10   --    --    --   13  14   ALT   --   17   --    --    --   15  28     Uric Acid  Recent Labs   Lab Test  09/18/17   1855   URIC  5.7     Calcium/VitaminD  Recent Labs   Lab Test  09/18/17   1855  03/14/15   2100  02/03/15   0630   DEDE  8.0*  8.5  7.6*     ESR/CRP  Recent Labs   Lab Test  09/18/17   1855  01/30/15   2025   SED  30*   --    CRP  6.4  182.0*     TSH/T4  Recent Labs   Lab Test  02/02/15   0635   TSH  2.78     UA  Recent Labs   Lab Test  03/14/15   2223  03/29/14   1000  01/09/14   1005   COLOR  Straw  Light Yellow  Yellow   APPEARANCE  Clear  Slightly Cloudy  Clear   URINEGLC  Negative  Negative  Negative   URINEBILI  Negative  Negative  Negative   SG  1.005  1.016  1.025   URINEPH  6.5  5.5  5.5   PROTEIN  Negative  Negative  10*   NITRITE  Negative  Negative  Negative   UBLD  Negative  Trace*  Trace*   LEUKEST  Moderate*  Large*  Trace*   WBCU  9*  6*  <1   RBCU  0  5*  3*   BACTERIA  Few*   --    --    MUCOUS  Present*   --   Present*     Urine Microscopic  Recent Labs   Lab Test  03/14/15   2223  03/29/14   1000  01/09/14   1005   WBCU  9*  6*  <1   RBCU  0  5*  3*   BACTERIA  Few*   --    --    MUCOUS  Present*   --   Present*     Immunization History     Immunization History   Administered Date(s) Administered     Pneumococcal 23 valent 12/10/2012     TDAP Vaccine (Adacel) 12/10/2012          Chart documentation done in part with Dragon Voice recognition Software. Although reviewed after completion, some word and grammatical error may remain.    Fransisco Montana MD

## 2017-10-27 LAB
B BURGDOR IGG+IGM SER QL: 0.14 (ref 0–0.89)
C3 SERPL-MCNC: 99 MG/DL (ref 76–169)
C4 SERPL-MCNC: 22 MG/DL (ref 15–50)
CCP AB SER IA-ACNC: 167 U/ML
DSDNA AB SER-ACNC: 2 IU/ML
ENA RNP IGG SER IA-ACNC: <0.2 AI (ref 0–0.9)
ENA SCL70 IGG SER IA-ACNC: <0.2 AI (ref 0–0.9)
ENA SM IGG SER-ACNC: <0.2 AI (ref 0–0.9)
ENA SS-A IGG SER IA-ACNC: <0.2 AI (ref 0–0.9)
ENA SS-B IGG SER IA-ACNC: <0.2 AI (ref 0–0.9)

## 2017-11-01 DIAGNOSIS — E55.9 VITAMIN D DEFICIENCY: Primary | ICD-10-CM

## 2017-11-01 RX ORDER — ERGOCALCIFEROL 1.25 MG/1
50000 CAPSULE, LIQUID FILLED ORAL
Qty: 12 CAPSULE | Refills: 0 | Status: SHIPPED | OUTPATIENT
Start: 2017-11-01 | End: 2018-01-25

## 2017-11-20 DIAGNOSIS — Z79.899 HIGH RISK MEDICATION USE: ICD-10-CM

## 2017-11-20 DIAGNOSIS — M05.79 RHEUMATOID ARTHRITIS INVOLVING MULTIPLE SITES WITH POSITIVE RHEUMATOID FACTOR (H): ICD-10-CM

## 2017-11-20 LAB
ALBUMIN SERPL-MCNC: 3.6 G/DL (ref 3.4–5)
ALP SERPL-CCNC: 84 U/L (ref 40–150)
ALT SERPL W P-5'-P-CCNC: 23 U/L (ref 0–50)
AST SERPL W P-5'-P-CCNC: 15 U/L (ref 0–45)
BASOPHILS # BLD AUTO: 0 10E9/L (ref 0–0.2)
BASOPHILS NFR BLD AUTO: 0.4 %
BILIRUB DIRECT SERPL-MCNC: <0.1 MG/DL (ref 0–0.2)
BILIRUB SERPL-MCNC: 0.4 MG/DL (ref 0.2–1.3)
CREAT SERPL-MCNC: 0.87 MG/DL (ref 0.52–1.04)
DIFFERENTIAL METHOD BLD: ABNORMAL
EOSINOPHIL # BLD AUTO: 0.1 10E9/L (ref 0–0.7)
EOSINOPHIL NFR BLD AUTO: 2.6 %
ERYTHROCYTE [DISTWIDTH] IN BLOOD BY AUTOMATED COUNT: 16.9 % (ref 10–15)
GFR SERPL CREATININE-BSD FRML MDRD: 71 ML/MIN/1.7M2
HCT VFR BLD AUTO: 36.2 % (ref 35–47)
HGB BLD-MCNC: 11.7 G/DL (ref 11.7–15.7)
LYMPHOCYTES # BLD AUTO: 2.1 10E9/L (ref 0.8–5.3)
LYMPHOCYTES NFR BLD AUTO: 41.6 %
MCH RBC QN AUTO: 28.6 PG (ref 26.5–33)
MCHC RBC AUTO-ENTMCNC: 32.3 G/DL (ref 31.5–36.5)
MCV RBC AUTO: 89 FL (ref 78–100)
MONOCYTES # BLD AUTO: 0.4 10E9/L (ref 0–1.3)
MONOCYTES NFR BLD AUTO: 7.7 %
NEUTROPHILS # BLD AUTO: 2.4 10E9/L (ref 1.6–8.3)
NEUTROPHILS NFR BLD AUTO: 47.7 %
PLATELET # BLD AUTO: 220 10E9/L (ref 150–450)
PROT SERPL-MCNC: 7.2 G/DL (ref 6.8–8.8)
RBC # BLD AUTO: 4.09 10E12/L (ref 3.8–5.2)
WBC # BLD AUTO: 4.9 10E9/L (ref 4–11)

## 2017-11-20 PROCEDURE — 82565 ASSAY OF CREATININE: CPT | Performed by: INTERNAL MEDICINE

## 2017-11-20 PROCEDURE — 36415 COLL VENOUS BLD VENIPUNCTURE: CPT | Performed by: INTERNAL MEDICINE

## 2017-11-20 PROCEDURE — 85025 COMPLETE CBC W/AUTO DIFF WBC: CPT | Performed by: INTERNAL MEDICINE

## 2017-11-20 PROCEDURE — 80076 HEPATIC FUNCTION PANEL: CPT | Performed by: INTERNAL MEDICINE

## 2017-12-02 ENCOUNTER — HOSPITAL ENCOUNTER (EMERGENCY)
Facility: CLINIC | Age: 42
Discharge: HOME | End: 2017-12-02
Attending: FAMILY MEDICINE | Admitting: FAMILY MEDICINE
Payer: COMMERCIAL

## 2017-12-02 VITALS
SYSTOLIC BLOOD PRESSURE: 163 MMHG | DIASTOLIC BLOOD PRESSURE: 110 MMHG | TEMPERATURE: 98.2 F | HEART RATE: 91 BPM | OXYGEN SATURATION: 94 % | RESPIRATION RATE: 16 BRPM

## 2017-12-02 DIAGNOSIS — M26.609 TMJ (TEMPOROMANDIBULAR JOINT SYNDROME): ICD-10-CM

## 2017-12-02 DIAGNOSIS — M05.79 RHEUMATOID ARTHRITIS INVOLVING MULTIPLE SITES WITH POSITIVE RHEUMATOID FACTOR (H): ICD-10-CM

## 2017-12-02 PROCEDURE — 99284 EMERGENCY DEPT VISIT MOD MDM: CPT | Mod: Z6 | Performed by: FAMILY MEDICINE

## 2017-12-02 PROCEDURE — 99282 EMERGENCY DEPT VISIT SF MDM: CPT | Performed by: FAMILY MEDICINE

## 2017-12-02 ASSESSMENT — ENCOUNTER SYMPTOMS
EYES NEGATIVE: 1
TROUBLE SWALLOWING: 0
FEVER: 0
SHORTNESS OF BREATH: 0
NEUROLOGICAL NEGATIVE: 1
HEMATOLOGIC/LYMPHATIC NEGATIVE: 1
SORE THROAT: 0
ARTHRALGIAS: 1
PSYCHIATRIC NEGATIVE: 1
NAUSEA: 0

## 2017-12-02 NOTE — ED AVS SNAPSHOT
[facility] Emergency Department    [address] [address]    Phone:  [phone]    Fax:  [phone]                                       Maris Wagner   MRN: [id number]    Department:  [facility] Emergency Department   Date of Visit:  12/2/2017           Patient Information     Date Of Birth          1975        Your diagnoses for this visit were:     TMJ (temporomandibular joint syndrome)     Rheumatoid arthritis involving multiple sites with positive rheumatoid factor (H)        You were seen by Luigi Jean-Baptiste MD.        Discharge Instructions       Take some Prednisone.    I suggest 30 mg daily for 2 days, then decrease to 20 mg daily. Taper off over about 2 weeks.    Try heat.    Soft foods.    Follow up with your doctor if not improving.    Future Appointments        Provider Department Dept Phone Center    12/18/2017 8:00 AM [facility] [phone] [facility]    1/22/2018 8:00 AM [facility] [phone] [facility]    1/25/2018 8:20 AM Fransisco Montana MD [facility] [phone] [facility]      24 Hour Appointment Hotline       To make an appointment at any [facility], call [phone] ([phone]). If you don't have a family doctor or clinic, we will help you find one. [facility] are conveniently located to serve the needs of you and your family.             Review of your medicines      Our records show that you are taking the medicines listed below. If these are incorrect, please call your family doctor or clinic.        Dose / Directions Last dose taken    albuterol 108 (90 BASE) MCG/ACT Inhaler   Commonly known as:  PROAIR HFA/PROVENTIL HFA/VENTOLIN HFA   Dose:  2 puff        Inhale 2 puffs into the lungs every 6 hours   Refills:  0        * cyanocobalamin 1000 MCG/ML injection   Commonly known as:  VITAMIN B12   Dose:  1 mL   Quantity:  4 mL        Inject 1 mL (1,000 mcg) into the muscle once a week IM  "x 4 weeks then check B12 levels after 4th dose   Refills:  0        * cyanocobalamin 1000 MCG/ML injection   Commonly known as:  VITAMIN B12   Dose:  1 mL   Quantity:  4 mL        Inject 1 mL (1,000 mcg) into the muscle once a week   Refills:  0        famotidine 40 MG tablet   Commonly known as:  PEPCID   Dose:  40 mg   Quantity:  90 tablet        Take 1 tablet (40 mg) by mouth At Bedtime   Refills:  0        folic acid 1 MG tablet   Commonly known as:  FOLVITE   Dose:  1 mg   Quantity:  100 tablet        Take 1 tablet (1 mg) by mouth daily   Refills:  3        HYDROcodone-acetaminophen 5-325 MG per tablet   Commonly known as:  NORCO   Dose:  1 tablet   Quantity:  20 tablet        Take 1 tablet by mouth every 6 hours as needed for moderate to severe pain   Refills:  0        Insulin Syringe-Needle U-100 27G X 1/2\" 1 ML Misc   Commonly known as:  BD insulin syringe   Quantity:  10 each        For once weekly methotrexate administration   Refills:  3        LYRICA 75 MG capsule   Dose:  75 mg   Generic drug:  pregabalin        Take 75 mg by mouth 2 times daily   Refills:  0        methotrexate sodium 50 MG/2ML Soln   Dose:  20 mg   Quantity:  2 vial        Inject 0.8 mLs (20 mg) as directed every 7 days   Refills:  3        naproxen sodium 220 MG tablet   Commonly known as:  ANAPROX   Dose:  220 mg        Take 1 tablet (220 mg) by mouth 3 times daily as needed for moderate pain   Refills:  0        * predniSONE 20 MG tablet   Commonly known as:  DELTASONE   Quantity:  20 tablet        Take 3 tabs (60 mg) by mouth daily x 3 days, 2 tabs (40 mg) daily x 3 days, 1 tab (20 mg) daily x 3 days, then 1/2 tab (10 mg) x 3 days.   Refills:  0        * predniSONE 5 MG tablet   Commonly known as:  DELTASONE   Quantity:  120 tablet        Prednisone 20mg daily x5days, then 15mg daily x5days, then 10mg daily x5days, then 5mg daily thereafter.   Refills:  0        vitamin D 12765 UNIT capsule   Commonly known as:  ERGOCALCIFEROL "   Dose:  77587 Units   Quantity:  12 capsule        Take 1 capsule (50,000 Units) by mouth every 7 days   Refills:  0        * Notice:  This list has 4 medication(s) that are the same as other medications prescribed for you. Read the directions carefully, and ask your doctor or other care provider to review them with you.            Orders Needing Specimen Collection     None      Pending Results     No orders found from 11/30/2017 to 12/3/2017.            Pending Culture Results     No orders found from 11/30/2017 to 12/3/2017.            Pending Results Instructions     If you had any lab results that were not finalized at the time of your Discharge, you can call the ED Lab Result RN at [phone]. You will be contacted by this team for any positive Lab results or changes in treatment. The nurses are available 7 days a week from 10A to 6:30P.  You can leave a message 24 hours per day and they will return your call.        Test Results From Your Hospital Stay               Thank you for choosing [facility]       Thank you for choosing [facility] for your care. Our goal is always to provide you with excellent care. Hearing back from our patients is one way we can continue to improve our services. Please take a few minutes to complete the written survey that you may receive in the mail after you visit with us. Thank you!        [facility] Information     [facility] gives you secure access to your electronic health record. If you see a primary care provider, you can also send messages to your care team and make appointments. If you have questions, please call your primary care clinic.  If you do not have a primary care provider, please call [phone] and they will assist you.        Care EveryWhere ID     This is your Care EveryWhere ID. This could be used by other organizations to access your [facility] medical records  [id number]        Equal Access to Services     JEFFERY CHIANG AH: renny Villalta  cierra aldrich waxay idiin hayaan adeeg kharash la'aan ah. So [facility] [phone].    ATENCIÓN: Si habla julissa, tiene a cabrales disposición servicios gratuitos de asistencia lingüística. Llame al [phone].    We comply with applicable federal civil rights laws and [place] laws. We do not discriminate on the basis of race, color, national origin, age, disability, sex, sexual orientation, or gender identity.            After Visit Summary       This is your record. Keep this with you and show to your community pharmacist(s) and doctor(s) at your next visit.

## 2017-12-02 NOTE — ED AVS SNAPSHOT
[facility] Emergency Department    [address] [address]    Phone:  [phone]    Fax:  [phone]                                       Maris Wagner   MRN: [id number]    Department:  [facility] Emergency Department   Date of Visit:  12/2/2017           After Visit Summary Signature Page     I have received my discharge instructions, and my questions have been answered. I have discussed any challenges I see with this plan with the nurse or doctor.    ..........................................................................................................................................  Patient/Patient Representative Signature      ..........................................................................................................................................  Patient Representative Print Name and Relationship to Patient    ..................................................               ................................................  Date                                            Time    ..........................................................................................................................................  Reviewed by Signature/Title    ...................................................              ..............................................  Date                                                            Time

## 2017-12-02 NOTE — DISCHARGE INSTRUCTIONS
Take some Prednisone.    I suggest 30 mg daily for 2 days, then decrease to 20 mg daily. Taper off over about 2 weeks.    Try heat.    Soft foods.    Follow up with your doctor if not improving.

## 2017-12-02 NOTE — ED PROVIDER NOTES
"  History     Chief Complaint   Patient presents with     Jaw Pain     right jaw pain, jaw \"popping\" also has tooth ache     HPI  Maris Wagner is a 42 year old female who has a history of rheumatoid arthritis, spinal stenosis of the lumbar region, and herniation of lumbar intervertebral disc s/p discectomy who presents to the ED for evaluation of jaw pain. Patient reports that she woke up this morning with pain in the right cheek that radiates into the right ear. Patient states that she has pain with palpation of the area. Patient notes that she has a popping sensation in the right jaw with opening. She says her mouth opens fully and teeth seem aligned OK although she has many missing teeth. She notes that she has a broke tooth in the right mandibular region for which she is scheduled for tooth extraction on 12/19/2017. Patient is currently on methotrexate for her rheumatoid arthritis. Patient is prescribed prednisone for rheumatoid arthritis as well but she is not currently taking this . Patient's rheumatologist is Dr. Montana.     Problem List:    Patient Active Problem List    Diagnosis Date Noted     Rheumatoid arthritis involving multiple sites with positive rheumatoid factor (H) 09/25/2017     Priority: Medium     Chronic back pain 04/12/2017     Priority: Medium     Recurrent herniation of lumbar disc 12/28/2016     Priority: Medium     S/P lumbar discectomy 07/18/2016     Priority: Medium     Anemia 02/02/2015     Priority: Medium     Irritable bowel syndrome 02/02/2015     Priority: Medium     Obesity 02/02/2015     Priority: Medium     Gastroesophageal reflux disease      Priority: Medium     Diagnosis updated by automated process. Provider to review and confirm.       Ileus, postoperative (H) 01/31/2015     Priority: Medium     Spinal stenosis of lumbar region 01/29/2015     Priority: Medium     Herniation of lumbar intervertebral disc without myelopathy 01/29/2015     Priority: Medium     " "CARDIOVASCULAR SCREENING; LDL GOAL LESS THAN 130 12/10/2012     Priority: Medium     Seasonal allergies 02/02/2015     Priority: Low        Past Medical History:    No past medical history on file.    Past Surgical History:    Past Surgical History:   Procedure Laterality Date     ARTHROSCOPY ANKLE, OPEN REPAIR LIGAMENT, COMBINED Right 8/3/2017    Procedure: COMBINED ARTHROSCOPY ANKLE, OPEN REPAIR LIGAMENT;  Right Ankle Arthroscopic Evaluation and Debridement,Lateral Ligament Repair,Fracture Evaluation, Open Reduction Internal Fixation ;  Surgeon: Clint Simon DPM;  Location: [facility]     OPEN REDUCTION INTERNAL FIXATION ANKLE Right 8/3/2017    Procedure: OPEN REDUCTION INTERNAL FIXATION ANKLE;;  Surgeon: Clint Simon DPM;  Location: [facility]     REMOVE FOREIGN BODY FINGER Left 3/28/2017    Procedure: REMOVE FOREIGN BODY FINGER;  Surgeon: Tabby Chan MD;  Location: [facility]     TUBAL LIGATION         Family History:    Family History   Problem Relation Age of Onset     DIABETES Mother      pre diabetes     Arthritis Mother      Hypertension Father      CANCER Maternal Grandmother      CANCER Maternal Grandfather      CANCER Paternal Grandmother      CANCER Paternal Grandfather      mesothelioma       Social History:  Marital Status:  Single [1]  Social History   Substance Use Topics     Smoking status: Current Every Day Smoker     Packs/day: 0.50     Years: 25.00     Types: Cigarettes     Smokeless tobacco: Never Used     Alcohol use Yes      Comment: rare        Medications:      vitamin D (ERGOCALCIFEROL) 68251 UNIT capsule   Insulin Syringe-Needle U-100 (BD INSULIN SYRINGE) 27G X 1/2\" 1 ML MISC   methotrexate sodium 50 MG/2ML SOLN   predniSONE (DELTASONE) 5 MG tablet   folic acid (FOLVITE) 1 MG tablet   HYDROcodone-acetaminophen (NORCO) 5-325 MG per tablet   naproxen sodium (ANAPROX) 220 MG tablet   predniSONE (DELTASONE) 20 MG tablet   cyanocobalamin (VITAMIN B12) 1000 MCG/ML injection "   cyanocobalamin (VITAMIN B12) 1000 MCG/ML injection   pregabalin (LYRICA) 75 MG capsule   famotidine (PEPCID) 40 MG tablet   albuterol (PROAIR HFA/PROVENTIL HFA/VENTOLIN HFA) 108 (90 BASE) MCG/ACT Inhaler         Review of Systems   Constitutional: Negative for fever.   HENT: Negative for sore throat and trouble swallowing.    Eyes: Negative.    Respiratory: Negative for shortness of breath.    Cardiovascular: Negative for chest pain.   Gastrointestinal: Negative for nausea.   Musculoskeletal: Positive for arthralgias (Right TMJ).   Skin: Negative for rash.   Neurological: Negative.    Hematological: Negative.    Psychiatric/Behavioral: Negative.        Physical Exam   BP: (!) 163/110  Pulse: 91  Temp: 98.2  F (36.8  C)  Resp: 16  SpO2: 94 %      Physical Exam   Constitutional: She appears well-developed.   HENT:   Head: Normocephalic.   POS: right TMJ tenderness  POS: Tenderness over the masseter muscle  No facial swelling or redness.  Multiple missing teeth and decay.   Eyes: Pupils are equal, round, and reactive to light. No scleral icterus.   Neck: No thyromegaly present.   Cardiovascular: Regular rhythm.    Pulmonary/Chest: No respiratory distress.   Abdominal: She exhibits no distension.   Musculoskeletal: She exhibits no tenderness.   Lymphadenopathy:     She has no cervical adenopathy.   Neurological: No cranial nerve deficit. Coordination normal.   Skin: No rash noted.   Psychiatric: She has a normal mood and affect.       ED Course     ED Course     Procedures               Critical Care time:  none               Labs Ordered and Resulted from Time of ED Arrival Up to the Time of Departure from the ED - No data to display  No results found for this or any previous visit (from the past 24 hour(s)).    Medications - No data to display    10:22 AM Patient assessed.    Assessments & Plan (with Medical Decision Making)     Patient's tenderness is over TMJ and masseter. SX more compatible with TMJ Syndrome than  dental abscess, salivary gland problem or facial cellulitis. Her RA may be a factor in this joint pain also.  I advised that she try her Prednisone and some heat. Management discussed. Patient advised to return for worsening or persistent symptoms. She voices understanding.      I have reviewed the nursing notes.    I have reviewed the findings, diagnosis, plan and need for follow up with the patient.       Discharge Medication List as of 12/2/2017 10:44 AM          Final diagnoses:   TMJ (temporomandibular joint syndrome)   Rheumatoid arthritis involving multiple sites with positive rheumatoid factor (H)     This document serves as a record of the services and decisions personally performed and made by Luigi Jean-Baptiste MD. It was created on HIS/HER behalf by   Mallorie Rojas, a trained medical scribe. The creation of this document is based the provider's statements to the medical scribe.  Mallorie Rojas 10:22 AM 12/2/2017    Provider:   The information in this document, created by the medical scribe for me, accurately reflects the services I personally performed and the decisions made by me. I have reviewed and approved this document for accuracy prior to leaving the patient care area.  Luigi Jean-Baptiste MD 10:22 AM 12/2/2017 12/2/2017   [facility] EMERGENCY DEPARTMENT     Luigi Jean-Baptiste MD  12/02/17 [id number]

## 2017-12-15 DIAGNOSIS — M05.79 RHEUMATOID ARTHRITIS INVOLVING MULTIPLE SITES WITH POSITIVE RHEUMATOID FACTOR (H): ICD-10-CM

## 2017-12-15 NOTE — TELEPHONE ENCOUNTER
methotrexate sodium 50 MG/2ML SOLN      Last Written Prescription Date:  10/26/17  Last Fill Quantity: 2,   # refills: 3  Last Office Visit: 10/26/17  Future Office visit:    Next 5 appointments (look out 90 days)     Jan 25, 2018  8:20 AM CST   Return Visit with Fransisco Montana MD   [facility]    [address] [address]   [phone]                   Routing refill request to provider for review/approval because:  Drug not on the [facility], [facility] or [facility] refill protocol or controlled substance

## 2017-12-16 NOTE — TELEPHONE ENCOUNTER
Rheumatology team: methotrexate refill request was received and refused.  She should have a sufficient supply; please check with the patient and her pharmacy to ensure that she has enough medication.    Fransisco Montana MD  12/16/2017 5:51 PM

## 2017-12-19 DIAGNOSIS — Z79.899 HIGH RISK MEDICATION USE: ICD-10-CM

## 2017-12-19 DIAGNOSIS — M05.79 RHEUMATOID ARTHRITIS INVOLVING MULTIPLE SITES WITH POSITIVE RHEUMATOID FACTOR (H): ICD-10-CM

## 2017-12-19 LAB
ALBUMIN SERPL-MCNC: 3.3 G/DL (ref 3.4–5)
ALP SERPL-CCNC: 79 U/L (ref 40–150)
ALT SERPL W P-5'-P-CCNC: 28 U/L (ref 0–50)
AST SERPL W P-5'-P-CCNC: 10 U/L (ref 0–45)
BASOPHILS # BLD AUTO: 0 10E9/L (ref 0–0.2)
BASOPHILS NFR BLD AUTO: 0.3 %
BILIRUB DIRECT SERPL-MCNC: <0.1 MG/DL (ref 0–0.2)
BILIRUB SERPL-MCNC: 0.5 MG/DL (ref 0.2–1.3)
CREAT SERPL-MCNC: 0.87 MG/DL (ref 0.52–1.04)
DIFFERENTIAL METHOD BLD: ABNORMAL
EOSINOPHIL # BLD AUTO: 0.3 10E9/L (ref 0–0.7)
EOSINOPHIL NFR BLD AUTO: 3.4 %
ERYTHROCYTE [DISTWIDTH] IN BLOOD BY AUTOMATED COUNT: 19.3 % (ref 10–15)
GFR SERPL CREATININE-BSD FRML MDRD: 72 ML/MIN/1.7M2
HCT VFR BLD AUTO: 37.1 % (ref 35–47)
HGB BLD-MCNC: 12 G/DL (ref 11.7–15.7)
LYMPHOCYTES # BLD AUTO: 2.7 10E9/L (ref 0.8–5.3)
LYMPHOCYTES NFR BLD AUTO: 37.7 %
MCH RBC QN AUTO: 29.6 PG (ref 26.5–33)
MCHC RBC AUTO-ENTMCNC: 32.3 G/DL (ref 31.5–36.5)
MCV RBC AUTO: 91 FL (ref 78–100)
MONOCYTES # BLD AUTO: 0.6 10E9/L (ref 0–1.3)
MONOCYTES NFR BLD AUTO: 8.4 %
NEUTROPHILS # BLD AUTO: 3.6 10E9/L (ref 1.6–8.3)
NEUTROPHILS NFR BLD AUTO: 50.2 %
PLATELET # BLD AUTO: 259 10E9/L (ref 150–450)
PROT SERPL-MCNC: 7.1 G/DL (ref 6.8–8.8)
RBC # BLD AUTO: 4.06 10E12/L (ref 3.8–5.2)
WBC # BLD AUTO: 7.3 10E9/L (ref 4–11)

## 2017-12-19 PROCEDURE — 82565 ASSAY OF CREATININE: CPT | Performed by: INTERNAL MEDICINE

## 2017-12-19 PROCEDURE — 85025 COMPLETE CBC W/AUTO DIFF WBC: CPT | Performed by: INTERNAL MEDICINE

## 2017-12-19 PROCEDURE — 36415 COLL VENOUS BLD VENIPUNCTURE: CPT | Performed by: INTERNAL MEDICINE

## 2017-12-19 PROCEDURE — 80076 HEPATIC FUNCTION PANEL: CPT | Performed by: INTERNAL MEDICINE

## 2017-12-20 NOTE — TELEPHONE ENCOUNTER
Spoke with pharmacy they were trying to be proactive to have prescription on file for next refill. Stated provider will most likely refill at next appointment with patient.  Roula Coulter CMA 12/20/2017 11:14 AM

## 2018-01-24 DIAGNOSIS — E55.9 VITAMIN D DEFICIENCY: ICD-10-CM

## 2018-01-24 LAB — DEPRECATED CALCIDIOL+CALCIFEROL SERPL-MC: 21 UG/L (ref 20–75)

## 2018-01-24 PROCEDURE — 82306 VITAMIN D 25 HYDROXY: CPT | Performed by: INTERNAL MEDICINE

## 2018-01-24 PROCEDURE — 36415 COLL VENOUS BLD VENIPUNCTURE: CPT | Performed by: INTERNAL MEDICINE

## 2018-01-25 ENCOUNTER — OFFICE VISIT (OUTPATIENT)
Dept: RHEUMATOLOGY | Facility: CLINIC | Age: 43
End: 2018-01-25
Payer: COMMERCIAL

## 2018-01-25 VITALS
TEMPERATURE: 97.2 F | DIASTOLIC BLOOD PRESSURE: 82 MMHG | SYSTOLIC BLOOD PRESSURE: 118 MMHG | OXYGEN SATURATION: 97 % | HEART RATE: 88 BPM | RESPIRATION RATE: 18 BRPM

## 2018-01-25 DIAGNOSIS — E55.9 VITAMIN D DEFICIENCY: ICD-10-CM

## 2018-01-25 DIAGNOSIS — M05.79 RHEUMATOID ARTHRITIS INVOLVING MULTIPLE SITES WITH POSITIVE RHEUMATOID FACTOR (H): Primary | ICD-10-CM

## 2018-01-25 DIAGNOSIS — Z79.899 HIGH RISK MEDICATION USE: ICD-10-CM

## 2018-01-25 LAB
ALBUMIN SERPL-MCNC: 3.5 G/DL (ref 3.4–5)
ALP SERPL-CCNC: 84 U/L (ref 40–150)
ALT SERPL W P-5'-P-CCNC: 19 U/L (ref 0–50)
AST SERPL W P-5'-P-CCNC: 12 U/L (ref 0–45)
BASOPHILS # BLD AUTO: 0 10E9/L (ref 0–0.2)
BASOPHILS NFR BLD AUTO: 0.1 %
BILIRUB DIRECT SERPL-MCNC: <0.1 MG/DL (ref 0–0.2)
BILIRUB SERPL-MCNC: 0.3 MG/DL (ref 0.2–1.3)
CREAT SERPL-MCNC: 0.85 MG/DL (ref 0.52–1.04)
CRP SERPL-MCNC: 4.2 MG/L (ref 0–8)
DIFFERENTIAL METHOD BLD: ABNORMAL
EOSINOPHIL # BLD AUTO: 0.2 10E9/L (ref 0–0.7)
EOSINOPHIL NFR BLD AUTO: 3.2 %
ERYTHROCYTE [DISTWIDTH] IN BLOOD BY AUTOMATED COUNT: 19.2 % (ref 10–15)
ERYTHROCYTE [SEDIMENTATION RATE] IN BLOOD BY WESTERGREN METHOD: 16 MM/H (ref 0–20)
GFR SERPL CREATININE-BSD FRML MDRD: 73 ML/MIN/1.7M2
HCT VFR BLD AUTO: 39 % (ref 35–47)
HGB BLD-MCNC: 12.7 G/DL (ref 11.7–15.7)
LYMPHOCYTES # BLD AUTO: 2.3 10E9/L (ref 0.8–5.3)
LYMPHOCYTES NFR BLD AUTO: 33.4 %
MCH RBC QN AUTO: 30.2 PG (ref 26.5–33)
MCHC RBC AUTO-ENTMCNC: 32.6 G/DL (ref 31.5–36.5)
MCV RBC AUTO: 93 FL (ref 78–100)
MONOCYTES # BLD AUTO: 0.8 10E9/L (ref 0–1.3)
MONOCYTES NFR BLD AUTO: 11.1 %
NEUTROPHILS # BLD AUTO: 3.6 10E9/L (ref 1.6–8.3)
NEUTROPHILS NFR BLD AUTO: 52.2 %
PLATELET # BLD AUTO: 164 10E9/L (ref 150–450)
PROT SERPL-MCNC: 7.3 G/DL (ref 6.8–8.8)
RBC # BLD AUTO: 4.2 10E12/L (ref 3.8–5.2)
WBC # BLD AUTO: 6.9 10E9/L (ref 4–11)

## 2018-01-25 PROCEDURE — 36415 COLL VENOUS BLD VENIPUNCTURE: CPT | Performed by: INTERNAL MEDICINE

## 2018-01-25 PROCEDURE — 82565 ASSAY OF CREATININE: CPT | Performed by: INTERNAL MEDICINE

## 2018-01-25 PROCEDURE — 80076 HEPATIC FUNCTION PANEL: CPT | Performed by: INTERNAL MEDICINE

## 2018-01-25 PROCEDURE — 85025 COMPLETE CBC W/AUTO DIFF WBC: CPT | Performed by: INTERNAL MEDICINE

## 2018-01-25 PROCEDURE — 85652 RBC SED RATE AUTOMATED: CPT | Performed by: INTERNAL MEDICINE

## 2018-01-25 PROCEDURE — 86140 C-REACTIVE PROTEIN: CPT | Performed by: INTERNAL MEDICINE

## 2018-01-25 PROCEDURE — 99213 OFFICE O/P EST LOW 20 MIN: CPT | Performed by: INTERNAL MEDICINE

## 2018-01-25 RX ORDER — ERGOCALCIFEROL 1.25 MG/1
50000 CAPSULE, LIQUID FILLED ORAL
Qty: 12 CAPSULE | Refills: 1 | Status: SHIPPED | OUTPATIENT
Start: 2018-01-25 | End: 2018-08-23

## 2018-01-25 ASSESSMENT — PAIN SCALES - GENERAL: PAINLEVEL: NO PAIN (0)

## 2018-01-25 NOTE — MR AVS SNAPSHOT
After Visit Summary   1/25/2018    Maris Wagner    MRN: [id number]           Patient Information     Date Of Birth          1975        Visit Information        Provider Department      1/25/2018 8:20 AM Fransisco Montana MD [facility] [place]        Today's Diagnoses     Rheumatoid arthritis involving multiple sites with positive rheumatoid factor (H)    -  1    Vitamin D deficiency        High risk medication use           Follow-ups after your visit        Your next 10 appointments already scheduled     Apr 23, 2018  9:00 AM CDT   LAB with [facility]   [facility] ([facility])    [address] [place] [id number]   [phone]           Please do not eat 10-12 hours before your appointment if you are coming in fasting for labs on lipids, cholesterol, or glucose (sugar). This does not apply to pregnant women. Water, hot tea and black coffee (with nothing added) are okay. Do not drink other fluids, diet soda or chew gum.            Apr 25, 2018  7:20 AM CDT   Return Visit with Fransisco Montana MD   [facility] [place] ([facility] [place])    [address] [address]   [phone]              Future tests that were ordered for you today     Open Future Orders        Priority Expected Expires Ordered    CBC with platelets differential Routine 4/21/2018 5/10/2018 1/25/2018    Creatinine Routine 4/21/2018 5/10/2018 1/25/2018    Erythrocyte sedimentation rate auto Routine 4/21/2018 5/10/2018 1/25/2018    CRP inflammation Routine 4/21/2018 5/10/2018 1/25/2018    Hepatic panel Routine 4/21/2018 5/10/2018 1/25/2018            Who to contact     If you have questions or need follow up information about today's clinic visit or your schedule please contact [facility] directly at [phone].  Normal or non-critical lab and imaging results will be communicated to you by MyChart, letter or phone within 4 business  days after the clinic has received the results. If you do not hear from us within 7 days, please contact the clinic through [employer] or phone. If you have a critical or abnormal lab result, we will notify you by phone as soon as possible.  Submit refill requests through [employer] or call your pharmacy and they will forward the refill request to us. Please allow 3 business days for your refill to be completed.          Additional Information About Your Visit        [employer] Information     [employer] gives you secure access to your electronic health record. If you see a primary care provider, you can also send messages to your care team and make appointments. If you have questions, please call your primary care clinic.  If you do not have a primary care provider, please call [phone] and they will assist you.        Care EveryWhere ID     This is your Care EveryWhere ID. This could be used by other organizations to access your [facility] medical records  [id number]        Your Vitals Were     Pulse Temperature Respirations Pulse Oximetry          88 97.2  F (36.2  C) (Oral) 18 97%         Blood Pressure from Last 3 Encounters:   01/25/18 118/82   12/02/17 (!) 163/110   10/10/17 123/68    Weight from Last 3 Encounters:   10/26/17 117.1 kg (258 lb 3.2 oz)   10/10/17 114.1 kg (251 lb 9.6 oz)   09/18/17 113.5 kg (250 lb 4.8 oz)              We Performed the Following     CBC with platelets differential     Creatinine     CRP inflammation     Erythrocyte sedimentation rate auto     Hepatic panel          Where to get your medicines      [facility] [place] [facility] [id number]  [address], [place] [address]    Hours:  [facility] Phone:  [phone]     methotrexate sodium 50 MG/2ML Soln    vitamin D 52986 UNIT capsule          Primary Care Provider Office Phone # Fax #    Arsh Alvarez [phone]  [facility] [place] [address]        Equal Access to Services     JEFFERY CHIANG : Omayra Penny, waaxda elinor, qaybta harimamichael self, reginald paula. So [facility] [phone].    ATENCIÓN: Si habla español, tiene a cabrales disposición servicios gratuitos de asistencia lingüística. Llame al [phone].    We comply with applicable federal civil rights laws and [place] laws. We do not discriminate on the basis of race, color, national origin, age, disability, sex, sexual orientation, or gender identity.            Thank you!     Thank you for choosing [facility]  for your care. Our goal is always to provide you with excellent care. Hearing back from our patients is one way we can continue to improve our services. Please take a few minutes to complete the written survey that you may receive in the mail after your visit with us. Thank you!             Your Updated Medication List - Protect others around you: Learn how to safely use, store and throw away your medicines at www.disposemymeds.org.          This list is accurate as of 1/25/18  8:52 AM.  Always use your most recent med list.                   Brand Name Dispense Instructions for use Diagnosis    albuterol 108 (90 BASE) MCG/ACT Inhaler    PROAIR HFA/PROVENTIL HFA/VENTOLIN HFA     Inhale 2 puffs into the lungs every 6 hours        * cyanocobalamin 1000 MCG/ML injection    VITAMIN B12    4 mL    Inject 1 mL (1,000 mcg) into the muscle once a week IM x 4 weeks then check B12 levels after 4th dose    Anemia       * cyanocobalamin 1000 MCG/ML injection    VITAMIN B12    4 mL    Inject 1 mL (1,000 mcg) into the muscle once a week    Other vitamin B12 deficiency anemia       famotidine 40 MG tablet    PEPCID    90 tablet    Take 1 tablet (40 mg) by mouth At Bedtime    Gastroesophageal reflux disease without esophagitis       folic acid 1 MG tablet    FOLVITE    100 tablet    Take 1 tablet (1 mg)  "by mouth daily    Rheumatoid arthritis involving multiple sites with positive rheumatoid factor (H), High risk medication use       HYDROcodone-acetaminophen 5-325 MG per tablet    NORCO    20 tablet    Take 1 tablet by mouth every 6 hours as needed for moderate to severe pain    Rheumatoid arthritis involving both hands with positive rheumatoid factor (H)       Insulin Syringe-Needle U-100 27G X 1/2\" 1 ML Misc    BD insulin syringe    10 each    For once weekly methotrexate administration    Rheumatoid arthritis involving multiple sites with positive rheumatoid factor (H)       LYRICA 75 MG capsule   Generic drug:  pregabalin      Take 75 mg by mouth 2 times daily        methotrexate sodium 50 MG/2ML Soln     6 vial    Inject 0.8 mLs (20 mg) as directed every 7 days    Rheumatoid arthritis involving multiple sites with positive rheumatoid factor (H)       naproxen sodium 220 MG tablet    ANAPROX     Take 1 tablet (220 mg) by mouth 3 times daily as needed for moderate pain    Rheumatoid arthritis involving both hands with positive rheumatoid factor (H)       * predniSONE 20 MG tablet    DELTASONE    20 tablet    Take 3 tabs (60 mg) by mouth daily x 3 days, 2 tabs (40 mg) daily x 3 days, 1 tab (20 mg) daily x 3 days, then 1/2 tab (10 mg) x 3 days.    Rheumatoid arthritis involving both hands with positive rheumatoid factor (H)       * predniSONE 5 MG tablet    DELTASONE    120 tablet    Prednisone 20mg daily x5days, then 15mg daily x5days, then 10mg daily x5days, then 5mg daily thereafter.    Rheumatoid arthritis involving multiple sites with positive rheumatoid factor (H)       vitamin D 82252 UNIT capsule    ERGOCALCIFEROL    12 capsule    Take 1 capsule (50,000 Units) by mouth every 7 days    Vitamin D deficiency       * Notice:  This list has 4 medication(s) that are the same as other medications prescribed for you. Read the directions carefully, and ask your doctor or other care provider to review them with you. "

## 2018-01-25 NOTE — PROGRESS NOTES
Rheumatology Clinic Visit      Maris Wagner MRN# [id number]   YOB: 1975 Age: 42 year old      Date of visit: 1/25/18   PCP: Dr. Arsh Alvarez    Chief Complaint   Patient presents with:  RECHECK      Assessment and Plan     1. Rheumatoid Arthritis (, ): Dx'd 2017.  Initially presented with symmetric synovitis of the MCPs and PIPs; morning stiffness all day.  Currently on methotrexate 20 mg SQ once weekly.  She reports approximately 70% improvement with methotrexate monotherapy after 3 months.  Continue methotrexate.  May consider adding an additional DMARD depending on response in 3 months.      - Continue methotrexate 20mg SQ once every 7 days  - Continue folic acid 1mg daily  - Labs today and 2-3 days prior to the next rheumatology clinic visit: CBC, Creatinine, Hepatic Panel, ESR, CRP    2. KIERAN 1:1280 nucleolar: Additional labs not suggestive of an KIERAN-associated rheumatologic disorder.  Other than inflammatory arthritis that is better explained by rheumatoid arthritis, she does not have symptoms to suggest an KIERAN-associated rheumatologic disorder.      3.  Vitamin D deficiency: Has been on ergocalciferol 50,000 units once weekly with some improvement; continue this dose for now.  Plan to recheck vitamin D in 6 months   - Continue ergocalciferol 50,000 units once weekly     Ms. Wagner verbalized agreement with and understanding of the rational for the diagnosis and treatment plan.  All questions were answered to best of my ability and the patient's satisfaction. Ms. Wagner was advised to contact the clinic with any questions that may arise after the clinic visit.      Thank you for involving me in the care of the patient    Return to clinic: 3 months      HPI   Maris Wagner is a 42 year old female with a past medical history significant for postoperative ileus, spinal stenosis of lumbar spine status-post discectomy, GERD, irritable bowel syndrome, and rheumatoid arthritis who is seen in  consultation at the request of Dr. Arsh Alvarez for evaluation of rheumatoid arthritis.    Today, Ms. Wagner reports that methotrexate is a miracle drug.  She says that she loves it.  She reports that she is approximately 70% better with methotrexate.  Significantly less pain and stiffness in her hands.  Morning stiffness that lasts for no more than 15-20 minutes.  Not using prednisone.      Denies fevers, chills, nausea, vomiting, constipation, diarrhea. No abdominal pain. No chest pain/pressure, palpitations, or shortness of breath. No LE swelling. No neck pain. No oral or nasal sores.  No rash. Dry eyes only with allergies.  No dry mouth.  No photosensitivity or photophobia. No eye pain or redness. No history of inflammatory eye disease.  No history of DVT, pulmonary embolism, or miscarriage.   No history of serositis.  No raynaud's phenomenon.  No seizure history. No known renal disorder.      Father: RA    Tobacco: 0.5 ppd  EtOH: no more than 1 drink per month  Drugs: none    ROS   GEN: No fevers, chills, night sweats, or weight change  SKIN: No itching, rashes, sores  HEENT: No oral or nasal ulcers.  CV: No chest pain, pressure, palpitations, or dyspnea on exertion.  PULM: No SOB, wheeze, cough.  GI: No nausea, vomiting, constipation, diarrhea. No blood in stool. No abdominal pain.  : No blood in urine.  MSK: See HPI.  NEURO: No numbness, tingling, or weakness.  EXT: No LE swelling  PSYCH: Negative    Active Problem List     Patient Active Problem List   Diagnosis     CARDIOVASCULAR SCREENING; LDL GOAL LESS THAN 130     Ileus, postoperative (H)     Spinal stenosis of lumbar region     Anemia     Gastroesophageal reflux disease     Irritable bowel syndrome     Seasonal allergies     Obesity     S/P lumbar discectomy     Chronic back pain     Recurrent herniation of lumbar disc     Herniation of lumbar intervertebral disc without myelopathy     Rheumatoid arthritis involving multiple sites with positive  "rheumatoid factor (H)     Past Medical History   History reviewed. No pertinent past medical history.  Past Surgical History     Past Surgical History:   Procedure Laterality Date     ARTHROSCOPY ANKLE, OPEN REPAIR LIGAMENT, COMBINED Right 8/3/2017    Procedure: COMBINED ARTHROSCOPY ANKLE, OPEN REPAIR LIGAMENT;  Right Ankle Arthroscopic Evaluation and Debridement,Lateral Ligament Repair,Fracture Evaluation, Open Reduction Internal Fixation ;  Surgeon: Clint Simon DPM;  Location: [facility]     OPEN REDUCTION INTERNAL FIXATION ANKLE Right 8/3/2017    Procedure: OPEN REDUCTION INTERNAL FIXATION ANKLE;;  Surgeon: Clint Simon DPM;  Location: [facility]     REMOVE FOREIGN BODY FINGER Left 3/28/2017    Procedure: REMOVE FOREIGN BODY FINGER;  Surgeon: Tabby Chan MD;  Location: [facility]     TUBAL LIGATION       Allergy     Allergies   Allergen Reactions     Nka [No Known Allergies]      Current Medication List     Current Outpatient Prescriptions   Medication Sig     methotrexate sodium 50 MG/2ML SOLN Inject 0.8 mLs (20 mg) as directed every 7 days     vitamin D (ERGOCALCIFEROL) 36399 UNIT capsule Take 1 capsule (50,000 Units) by mouth every 7 days     Insulin Syringe-Needle U-100 (BD INSULIN SYRINGE) 27G X 1/2\" 1 ML MISC For once weekly methotrexate administration     cyanocobalamin (VITAMIN B12) 1000 MCG/ML injection Inject 1 mL (1,000 mcg) into the muscle once a week     cyanocobalamin (VITAMIN B12) 1000 MCG/ML injection Inject 1 mL (1,000 mcg) into the muscle once a week IM x 4 weeks then check B12 levels after 4th dose     pregabalin (LYRICA) 75 MG capsule Take 75 mg by mouth 2 times daily     famotidine (PEPCID) 40 MG tablet Take 1 tablet (40 mg) by mouth At Bedtime     albuterol (PROAIR HFA/PROVENTIL HFA/VENTOLIN HFA) 108 (90 BASE) MCG/ACT Inhaler Inhale 2 puffs into the lungs every 6 hours     predniSONE (DELTASONE) 5 MG tablet Prednisone 20mg daily x5days, then 15mg daily x5days, then 10mg daily " "x5days, then 5mg daily thereafter. (Patient not taking: Reported on 1/25/2018)     folic acid (FOLVITE) 1 MG tablet Take 1 tablet (1 mg) by mouth daily (Patient not taking: Reported on 1/25/2018)     [DISCONTINUED] methotrexate sodium 50 MG/2ML SOLN Inject 0.8 mLs (20 mg) as directed every 7 days     HYDROcodone-acetaminophen (NORCO) 5-325 MG per tablet Take 1 tablet by mouth every 6 hours as needed for moderate to severe pain (Patient not taking: Reported on 1/25/2018)     naproxen sodium (ANAPROX) 220 MG tablet Take 1 tablet (220 mg) by mouth 3 times daily as needed for moderate pain     predniSONE (DELTASONE) 20 MG tablet Take 3 tabs (60 mg) by mouth daily x 3 days, 2 tabs (40 mg) daily x 3 days, 1 tab (20 mg) daily x 3 days, then 1/2 tab (10 mg) x 3 days. (Patient not taking: Reported on 1/25/2018)     No current facility-administered medications for this visit.          Social History   See HPI    Family History     Family History   Problem Relation Age of Onset     DIABETES Mother      pre diabetes     Arthritis Mother      Hypertension Father      CANCER Maternal Grandmother      CANCER Maternal Grandfather      CANCER Paternal Grandmother      CANCER Paternal Grandfather      mesothelioma     Father: RA    Physical Exam     Temp Readings from Last 3 Encounters:   01/25/18 97.2  F (36.2  C) (Oral)   12/02/17 98.2  F (36.8  C) (Oral)   10/26/17 98.2  F (36.8  C) (Oral)     BP Readings from Last 5 Encounters:   01/25/18 118/82   12/02/17 (!) 163/110   10/10/17 123/68   09/18/17 129/84   09/07/17 116/76     Pulse Readings from Last 1 Encounters:   01/25/18 88     Resp Readings from Last 1 Encounters:   01/25/18 18     Estimated body mass index is 42.97 kg/(m^2) as calculated from the following:    Height as of 10/26/17: 1.651 m (5' 5\").    Weight as of 10/26/17: 117.1 kg (258 lb 3.2 oz).    GEN: NAD  HEENT: MMM. No oral lesions. Anicteric, noninjected sclera  CV: S1, S2. RRR. No m/r/g.  PULM: CTA bilaterally. No " w/c.  MSK: Mild tenderness palpation of the bilateral second-fourth MCPs and second PIPs; swelling of the bilateral second MCPs.  Wrists, elbows, shoulders, knees, ankles, and MTPs without swelling or tenderness to palpation.  Hips nontender to direct palpation.     NEURO: UE and LE strengths 5/5 and equal bilaterally.   SKIN: No rash  EXT: No LE edema  PSYCH: Alert. Appropriate.    Labs / Imaging (select studies)   RF/CCP  Recent Labs   Lab Test  10/26/17   0919  09/18/17   1855   CCPIGG  167*   --    RHF   --   360*     KIERAN  Recent Labs   Lab Test  09/18/17 1855   CARLY  Positive*   ANAP1  NUCLEOLAR   ANAT1  >1280     RNP/Sm/SSA/SSB  Recent Labs   Lab Test  10/26/17   0919   RNPIGG  <0.2   SMIGG  <0.2   SSAIGG  <0.2   SSBIGG  <0.2   SCLIGG  <0.2     dsDNA  Recent Labs   Lab Test  10/26/17   0919   DNA  2     C3/C4  Recent Labs   Lab Test  10/26/17   0919   [id number]  99   [id number]  22     CBC  Recent Labs   Lab Test  01/25/18   0908  12/19/17   1317  11/20/17   0812   WBC  6.9  7.3  4.9   RBC  4.20  4.06  4.09   HGB  12.7  12.0  11.7   HCT  39.0  37.1  36.2   MCV  93  91  89   RDW  19.2*  19.3*  16.9*   PLT  164  259  220   MCH  30.2  29.6  28.6   MCHC  32.6  32.3  32.3   NEUTROPHIL  52.2  50.2  47.7   LYMPH  33.4  37.7  41.6   MONOCYTE  11.1  8.4  7.7   EOSINOPHIL  3.2  3.4  2.6   BASOPHIL  0.1  0.3  0.4   ANEU  3.6  3.6  2.4   ALYM  2.3  2.7  2.1   RAINA  0.8  0.6  0.4   AEOS  0.2  0.3  0.1   ABAS  0.0  0.0  0.0     CMP  Recent Labs   Lab Test  01/25/18   0908  12/19/17   1317  11/20/17   0812  10/26/17   0919  09/18/17   1855  03/14/15   2100  02/03/15   0630  02/01/15   0635  01/31/15   0639   NA   --    --    --   140  139  139  139  140  137   POTASSIUM   --    --    --   4.2  3.8  3.9  3.9  3.8  3.8   CHLORIDE   --    --    --   109  108  107  108  108  104   CO2   --    --    --   25  23  25  26  25  23   ANIONGAP   --    --    --   6  8  7  5  7  10   GLC   --    --    --   94  93  102*  99  98  116*   BUN    --    --    --   12  16  9  7  9  9   CR  0.85  0.87  0.87  0.70  0.78  0.81  0.66  0.63  0.65   GFRESTIMATED  73  72  71  >90  81  79  >90  Non  GFR Calc    >90  Non  GFR Calc    >90  Non  GFR Calc     GFRESTBLACK  89  87  86  >90  >90  >90  African American GFR Calc    >90   GFR Calc    >90   GFR Calc    >90   GFR Calc     DEDE   --    --    --   8.2*  8.0*  8.5  7.6*  7.5*  7.6*   BILITOTAL  0.3  0.5  0.4  0.3   --   0.2   --    --    --    ALBUMIN  3.5  3.3*  3.6  2.9*   --   3.5   --    --    --    PROTTOTAL  7.3  7.1  7.2  6.6*   --   7.2   --    --    --    ALKPHOS  84  79  84  69   --   74   --    --    --    AST  12  10  15  7   --   10   --    --    --    ALT  19  28  23  15   --   17   --    --    --      Uric Acid  Recent Labs   Lab Test  09/18/17   1855   URIC  5.7     Calcium/VitaminD  Recent Labs   Lab Test  01/24/18   1002  10/26/17   0919  09/18/17   1855  03/14/15   2100   DEDE   --   8.2*  8.0*  8.5   VITDT  21  17*   --    --      ESR/CRP  Recent Labs   Lab Test  01/25/18   0908  10/26/17   0919  09/18/17   1855   SED  16  53*  30*   CRP  4.2  <2.9  6.4     CK/Aldolase  Recent Labs   Lab Test  10/26/17   0919   CKT  83     TSH/T4  Recent Labs   Lab Test  02/02/15   0635   TSH  2.78     Hepatitis B  Recent Labs   Lab Test  10/26/17   0919   HBCAB  Nonreactive   HEPBANG  Nonreactive     Hepatitis C  Recent Labs   Lab Test  10/26/17   0919   HCVAB  Nonreactive     Lyme ab screening  Recent Labs   Lab Test  10/26/17   0919   LYMEGM  0.14     UA  Recent Labs   Lab Test  10/26/17   0905  03/14/15   2223  03/29/14   1000  01/09/14   1005   COLOR  Yellow  Straw  Light Yellow  Yellow   APPEARANCE  Clear  Clear  Slightly Cloudy  Clear   URINEGLC  Negative  Negative  Negative  Negative   URINEBILI  Negative  Negative  Negative  Negative   SG  1.025  1.005  1.016  1.025   URINEPH  6.0  6.5  5.5  5.5   PROTEIN   Negative  Negative  Negative  10*   UROBILINOGEN  0.2   --    --    --    NITRITE  Negative  Negative  Negative  Negative   UBLD  Negative  Negative  Trace*  Trace*   LEUKEST  Negative  Moderate*  Large*  Trace*   WBCU  O - 2  9*  6*  <1   RBCU  O - 2  0  5*  3*   SQUAMOUSEPI  Moderate*   --    --    --    BACTERIA   --   Few*   --    --    MUCOUS   --   Present*   --   Present*     Urine Microscopic  Recent Labs   Lab Test  10/26/17   0905  03/14/15   2223  03/29/14   1000  01/09/14   1005   WBCU  O - 2  9*  6*  <1   RBCU  O - 2  0  5*  3*   SQUAMOUSEPI  Moderate*   --    --    --    BACTERIA   --   Few*   --    --    MUCOUS   --   Present*   --   Present*     Urine Protein  Recent Labs   Lab Test  10/26/17   0905   UTP  0.14   UTPG  0.14         Immunization History     Immunization History   Administered Date(s) Administered     Pneumococcal 23 valent 12/10/2012     TDAP Vaccine (Adacel) 12/10/2012          Chart documentation done in part with Dragon Voice recognition Software. Although reviewed after completion, some word and grammatical error may remain.    Fransisco Montana MD

## 2018-01-25 NOTE — NURSING NOTE
"Chief Complaint   Patient presents with     RECHECK       Initial /82  Pulse 88  Temp 97.2  F (36.2  C) (Oral)  Resp 18  SpO2 97% Estimated body mass index is 42.97 kg/(m^2) as calculated from the following:    Height as of 10/26/17: 1.651 m (5' 5\").    Weight as of 10/26/17: 117.1 kg (258 lb 3.2 oz).           RAPID3 (0-30) Cumulative Score  16.7          RAPID3 Weighted Score (divide #4 by 3 and that is the weighted score)  5.6         "

## 2018-02-08 ENCOUNTER — HOSPITAL ENCOUNTER (EMERGENCY)
Facility: CLINIC | Age: 43
Discharge: HOME | End: 2018-02-08
Attending: NURSE PRACTITIONER | Admitting: NURSE PRACTITIONER
Payer: COMMERCIAL

## 2018-02-08 ENCOUNTER — APPOINTMENT (OUTPATIENT)
Dept: GENERAL RADIOLOGY | Facility: CLINIC | Age: 43
End: 2018-02-08
Attending: NURSE PRACTITIONER
Payer: COMMERCIAL

## 2018-02-08 VITALS
RESPIRATION RATE: 16 BRPM | SYSTOLIC BLOOD PRESSURE: 146 MMHG | HEART RATE: 92 BPM | BODY MASS INDEX: 39.94 KG/M2 | WEIGHT: 240 LBS | TEMPERATURE: 98.3 F | DIASTOLIC BLOOD PRESSURE: 97 MMHG | OXYGEN SATURATION: 98 %

## 2018-02-08 DIAGNOSIS — M25.472 ANKLE SWELLING, LEFT: ICD-10-CM

## 2018-02-08 DIAGNOSIS — S93.402A SPRAIN OF LEFT ANKLE, UNSPECIFIED LIGAMENT, INITIAL ENCOUNTER: ICD-10-CM

## 2018-02-08 PROCEDURE — 99283 EMERGENCY DEPT VISIT LOW MDM: CPT

## 2018-02-08 PROCEDURE — 73610 X-RAY EXAM OF ANKLE: CPT | Mod: LT

## 2018-02-08 PROCEDURE — 73630 X-RAY EXAM OF FOOT: CPT | Mod: LT

## 2018-02-08 PROCEDURE — 99283 EMERGENCY DEPT VISIT LOW MDM: CPT | Mod: Z6 | Performed by: NURSE PRACTITIONER

## 2018-02-08 RX ORDER — OXYCODONE AND ACETAMINOPHEN 5; 325 MG/1; MG/1
1 TABLET ORAL EVERY 4 HOURS PRN
COMMUNITY
End: 2018-12-04

## 2018-02-08 NOTE — ED NOTES
Pt reports left surgical foot procedure  With Dr. Liz anchoring tendons and ligaments at [place] last Tuesday.   Pt slipped in shower on Friday and was concerned with left foot, pt seen Dr. Liz on Friday afternoon.   Pt concerned of left foot increased edema with drainage on left side of foot.  Pt called Dr. Liz's office on Monday and advised of ecchymosis with drainage.   Dr. Liz's office thought pt would be okay until next office visit this coming Monday.   Pt continues to have drainage from left foot with ecchymosis.   Pt reports taking pain medication as prescribed every 4 hours.   Pt's left foot has small amount of ecchymosis on toes and heel area.   Sutures intact with minimal yellowish drainage on dressing, no drainage out of wounds noted at this time.

## 2018-02-08 NOTE — ED AVS SNAPSHOT
[facility]    [address] [address]    Phone:  [phone]    Fax:  [phone]                                       Maris Wagner   MRN: [id number]    Department:  [facility]   Date of Visit:  2/8/2018           After Visit Summary Signature Page     I have received my discharge instructions, and my questions have been answered. I have discussed any challenges I see with this plan with the nurse or doctor.    ..........................................................................................................................................  Patient/Patient Representative Signature      ..........................................................................................................................................  Patient Representative Print Name and Relationship to Patient    ..................................................               ................................................  Date                                            Time    ..........................................................................................................................................  Reviewed by Signature/Title    ...................................................              ..............................................  Date                                                            Time

## 2018-02-08 NOTE — DISCHARGE INSTRUCTIONS
Elevate as much as possible.  Use cam boot as ordered by Dr. Liz.  Follow-up with Dr. Liz on Monday.  Return for fever, increased redness, or worsening in any way.

## 2018-02-08 NOTE — ED PROVIDER NOTES
History     Chief Complaint   Patient presents with     Post-op Problem     L foot surgery last tue, injured Friday, here for concern of bruising, increased drainage and increased pain.     GAYLE Wagner is a 42 year old female who is s/p left foot/ankle tendon/ligament repair done on 1/30/18 with podiatrist, Dr. Simon.  Patient slipped in the shower on Friday 2/8/18 and landed on her left foot. She was evaluated later that day in clinic with Dr. Liz, podiatrist/surgeon and reassured. Since that visit patient reports increased swelling, bruising, and incisional drainage. Patient is worried that she damaged her foot and would like an xray.    Problem List:    Patient Active Problem List    Diagnosis Date Noted     Rheumatoid arthritis involving multiple sites with positive rheumatoid factor (H) 09/25/2017     Priority: Medium     Chronic back pain 04/12/2017     Priority: Medium     Recurrent herniation of lumbar disc 12/28/2016     Priority: Medium     S/P lumbar discectomy 07/18/2016     Priority: Medium     Anemia 02/02/2015     Priority: Medium     Irritable bowel syndrome 02/02/2015     Priority: Medium     Obesity 02/02/2015     Priority: Medium     Gastroesophageal reflux disease      Priority: Medium     Diagnosis updated by automated process. Provider to review and confirm.       Ileus, postoperative (H) 01/31/2015     Priority: Medium     Spinal stenosis of lumbar region 01/29/2015     Priority: Medium     Herniation of lumbar intervertebral disc without myelopathy 01/29/2015     Priority: Medium     CARDIOVASCULAR SCREENING; LDL GOAL LESS THAN 130 12/10/2012     Priority: Medium     Seasonal allergies 02/02/2015     Priority: Low        Past Medical History:    No past medical history on file.    Past Surgical History:    Past Surgical History:   Procedure Laterality Date     ARTHROSCOPY ANKLE, OPEN REPAIR LIGAMENT, COMBINED Right 8/3/2017    Procedure: COMBINED ARTHROSCOPY ANKLE, OPEN REPAIR  "LIGAMENT;  Right Ankle Arthroscopic Evaluation and Debridement,Lateral Ligament Repair,Fracture Evaluation, Open Reduction Internal Fixation ;  Surgeon: Clint Simon DPM;  Location: [facility]     OPEN REDUCTION INTERNAL FIXATION ANKLE Right 8/3/2017    Procedure: OPEN REDUCTION INTERNAL FIXATION ANKLE;;  Surgeon: Clint Simon DPM;  Location: [facility]     REMOVE FOREIGN BODY FINGER Left 3/28/2017    Procedure: REMOVE FOREIGN BODY FINGER;  Surgeon: Tabby Chan MD;  Location: [facility] OR     TUBAL LIGATION         Family History:    Family History   Problem Relation Age of Onset     DIABETES Mother      pre diabetes     Arthritis Mother      Hypertension Father      CANCER Maternal Grandmother      CANCER Maternal Grandfather      CANCER Paternal Grandmother      CANCER Paternal Grandfather      mesothelioma       Social History:  Marital Status:  Single [1]  Social History   Substance Use Topics     Smoking status: Current Every Day Smoker     Packs/day: 0.50     Years: 25.00     Types: Cigarettes     Smokeless tobacco: Never Used     Alcohol use Yes      Comment: rare        Medications:      oxyCODONE-acetaminophen (PERCOCET) 5-325 MG per tablet   methotrexate sodium 50 MG/2ML SOLN   vitamin D (ERGOCALCIFEROL) 58786 UNIT capsule   Insulin Syringe-Needle U-100 (BD INSULIN SYRINGE) 27G X 1/2\" 1 ML MISC   pregabalin (LYRICA) 75 MG capsule   famotidine (PEPCID) 40 MG tablet   albuterol (PROAIR HFA/PROVENTIL HFA/VENTOLIN HFA) 108 (90 BASE) MCG/ACT Inhaler         Review of Systems  As mentioned above in the history present illness. All other systems were reviewed and are negative.    Physical Exam   BP: (!) 146/97  Pulse: 92  Temp: 98.3  F (36.8  C)  Resp: 16  Weight: 108.9 kg (240 lb)  SpO2: 98 %      Physical Exam    Appearance: in no apparent distress and well developed and well nourished.  LEFT Foot/ankle exam: Swelling noted, 2 incision on the lateral aspect and 2 incision on the medial aspect.  " Incisions are dry, intact and no significant surrounding erythema (healing).  No increased warmth to the foot. There is ecchymosis noted along the lateral heel, lateral foot, 3rd, 4th, and 5th phalanx.  Tenderness with light palpation to the foot.         ED Course     ED Course     Procedures              Results for orders placed or performed during the hospital encounter of 02/08/18 (from the past 48 hour(s))   Foot XR, G/E 3 views, left    Narrative    ANKLE LEFT THREE OR MORE VIEWS, FOOT LEFT THREE OR MORE VIEWS February 8, 2018 11:57 AM    HISTORY: Postoperative foot (ligament/tendon) surgery on 1/30. Fall on  2/2/2018 with increased pain, swelling, and ecchymosis since then.     COMPARISON:  1/17/2016.      Impression    IMPRESSION:      Ankle: Mild diffuse soft tissue swelling around the ankle, more  prominent medially. A small subcentimeter linear calcification  parallels the inferomedial aspect of the medial malleolus within the  lower joint space. This was not present on the prior exam and there is  a suggestion of some interval flattening of the tip of the medial  malleolus. This is suspicious for a tiny displaced fracture,  chronicity not known. A few small calcifications or osseous densities  are present in the soft tissues inferior to the distal fibula,  possibly related to prior surgery.    Foot: Negative.    JAYDEN REAGAN MD   Ankle XR, G/E 3 views, left    Narrative    ANKLE LEFT THREE OR MORE VIEWS, FOOT LEFT THREE OR MORE VIEWS February 8, 2018 11:57 AM    HISTORY: Postoperative foot (ligament/tendon) surgery on 1/30. Fall on  2/2/2018 with increased pain, swelling, and ecchymosis since then.     COMPARISON:  1/17/2016.      Impression    IMPRESSION:      Ankle: Mild diffuse soft tissue swelling around the ankle, more  prominent medially. A small subcentimeter linear calcification  parallels the inferomedial aspect of the medial malleolus within the  lower joint space. This was not present on  the prior exam and there is  a suggestion of some interval flattening of the tip of the medial  malleolus. This is suspicious for a tiny displaced fracture,  chronicity not known. A few small calcifications or osseous densities  are present in the soft tissues inferior to the distal fibula,  possibly related to prior surgery.    Foot: Negative.    JAYDEN REAGAN MD         Labs Ordered and Resulted from Time of ED Arrival Up to the Time of Departure from the ED - No data to display    Assessments & Plan (with Medical Decision Making)   Maris Wagner is a 42 year old female who is s/p left foot/ankle tendon/ligament repair done on 1/30/18 with podiatrist, Dr. Simon.  Patient slipped in the shower on Friday 2/8/18 and landed on her left foot. She was evaluated later that day in clinic with Dr. Liz, podiatrist/surgeon and reassured. Since that visit patient reports increased swelling, bruising, and incisional drainage. Patient is worried that she damaged her foot and would like an xray.    On exam patient is alert and oriented.  Pleasant.  No acute distress.  There is swelling noted in the left foot and ankle.  There are 4 incisions 2 on each side of the foot/ankle.  Incisions are dry, intact, and no significant surrounding erythema.  Appear to be healing well.  No increased warmth to the foot.  There is ecchymosis noted at the lateral heel, lateral foot, third, fourth, and fifth phalanx.  Tenderness with light palpation to any portion of the foot.  X-ray of the foot and ankle were obtained and reveal mild diffuse soft tissue swelling, more prominent medially.  Small subcentimeter linear calcification parallels the end of her medial aspect of the medial malleolus.  Suspicious for a tiny displaced fracture, chronicity not known.  There is also a few small calcifications along the distal fibula, likely related to prior surgery. Patient likely suffered a sprain given her recent slip in the shower, swelling and  ecchymosis.  I discussed the findings of the x-ray with the patient and her spouse.  Findings are likely due to her recent surgery and did not appear to be worrisome.  However, I instructed patient to continue to use her cam walker boot as instructed by the podiatrist.  I recommend she contact her surgeon for close follow-up.  In the meantime I recommend elevating the leg as much as possible.  Return for fever, increased redness or drainage from the incision sites.        I have reviewed the nursing notes.    I have reviewed the findings, diagnosis, plan and need for follow up with the patient.      Discharge Medication List as of 2/8/2018 12:32 PM          Final diagnoses:   Ankle swelling, left   Sprain of left ankle, unspecified ligament, initial encounter       2/8/2018   [facility] EMERGENCY DEPARTMENT     Alla Murphy APRN CNP  02/08/18 [id number]

## 2018-02-08 NOTE — ED AVS SNAPSHOT
[facility] Emergency Department    [address] [address]    Phone:  [phone]    Fax:  [phone]                                       Maris Wagner   MRN: [id number]    Department:  [facility] Emergency Department   Date of Visit:  2/8/2018           Patient Information     Date Of Birth          1975        Your diagnoses for this visit were:     Ankle swelling, left     Sprain of left ankle, unspecified ligament, initial encounter        You were seen by Alla Murphy APRN CNP.      Follow-up Information     Follow up with Arsh Alvarez MD.    Specialty:  Family Practice    Why:  As needed    Contact information:    [address] [id number] [phone]          Discharge Instructions       Elevate as much as possible.  Use cam boot as ordered by Dr. Liz.  Follow-up with Dr. Liz on Monday.  Return for fever, increased redness, or worsening in any way.      Future Appointments        Provider Department Dept Phone Center    4/23/2018 9:00 AM [facility] [phone] [facility]    4/25/2018 7:20 AM Fransisco Montana MD [facility] [phone] [facility]      24 Hour Appointment Hotline       To make an appointment at any [facility], call [phone] ([phone]). If you don't have a family doctor or clinic, we will help you find one. [facility] are conveniently located to serve the needs of you and your family.             Review of your medicines      Our records show that you are taking the medicines listed below. If these are incorrect, please call your family doctor or clinic.        Dose / Directions Last dose taken    albuterol 108 (90 BASE) MCG/ACT Inhaler   Commonly known as:  PROAIR HFA/PROVENTIL HFA/VENTOLIN HFA   Dose:  2 puff        Inhale 2 puffs into the lungs every 6 hours   Refills:  0        famotidine 40 MG tablet   Commonly known as:  PEPCID   Dose:  40  "mg   Quantity:  90 tablet        Take 1 tablet (40 mg) by mouth At Bedtime   Refills:  0        Insulin Syringe-Needle U-100 27G X 1/2\" 1 ML Misc   Commonly known as:  BD insulin syringe   Quantity:  10 each        For once weekly methotrexate administration   Refills:  3        LYRICA 75 MG capsule   Dose:  75 mg   Generic drug:  pregabalin        Take 75 mg by mouth 2 times daily   Refills:  0        methotrexate sodium 50 MG/2ML Soln   Dose:  20 mg   Quantity:  6 vial        Inject 0.8 mLs (20 mg) as directed every 7 days   Refills:  1        oxyCODONE-acetaminophen 5-325 MG per tablet   Commonly known as:  PERCOCET   Dose:  1 tablet        Take 1 tablet by mouth every 4 hours as needed for moderate to severe pain   Refills:  0        vitamin D 98566 UNIT capsule   Commonly known as:  ERGOCALCIFEROL   Dose:  51123 Units   Quantity:  12 capsule        Take 1 capsule (50,000 Units) by mouth every 7 days   Refills:  1                Procedures and tests performed during your visit     Ankle XR, G/E 3 views, left    Foot XR, G/E 3 views, left      Orders Needing Specimen Collection     None      Pending Results     Date and Time Order Name Status Description    2/8/2018 1137 Ankle XR, G/E 3 views, left Preliminary     2/8/2018 1137 Foot XR, G/E 3 views, left Preliminary             Pending Culture Results     No orders found from 2/6/2018 to 2/9/2018.            Pending Results Instructions     If you had any lab results that were not finalized at the time of your Discharge, you can call the ED Lab Result RN at [phone]. You will be contacted by this team for any positive Lab results or changes in treatment. The nurses are available 7 days a week from 10A to 6:30P.  You can leave a message 24 hours per day and they will return your call.        Test Results From Your Hospital Stay        2/8/2018 12:14 PM      Narrative     ANKLE LEFT THREE OR MORE VIEWS, FOOT LEFT THREE OR MORE VIEWS February 8, 2018 11:57 " AM    HISTORY: Postoperative foot (ligament/tendon) surgery on 1/30. Fall on  2/2/2018 with increased pain, swelling, and ecchymosis since then.     COMPARISON:  1/17/2016.        Impression     IMPRESSION:      Ankle: Mild diffuse soft tissue swelling around the ankle, more  prominent medially. A small subcentimeter linear calcification  parallels the inferomedial aspect of the medial malleolus within the  lower joint space. This was not present on the prior exam and there is  a suggestion of some interval flattening of the tip of the medial  malleolus. This is suspicious for a tiny displaced fracture,  chronicity not known. A few small calcifications or osseous densities  are present in the soft tissues inferior to the distal fibula,  possibly related to prior surgery.    Foot: Negative.         2/8/2018 12:14 PM      Narrative     ANKLE LEFT THREE OR MORE VIEWS, FOOT LEFT THREE OR MORE VIEWS February 8, 2018 11:57 AM    HISTORY: Postoperative foot (ligament/tendon) surgery on 1/30. Fall on  2/2/2018 with increased pain, swelling, and ecchymosis since then.     COMPARISON:  1/17/2016.        Impression     IMPRESSION:      Ankle: Mild diffuse soft tissue swelling around the ankle, more  prominent medially. A small subcentimeter linear calcification  parallels the inferomedial aspect of the medial malleolus within the  lower joint space. This was not present on the prior exam and there is  a suggestion of some interval flattening of the tip of the medial  malleolus. This is suspicious for a tiny displaced fracture,  chronicity not known. A few small calcifications or osseous densities  are present in the soft tissues inferior to the distal fibula,  possibly related to prior surgery.    Foot: Negative.                Thank you for choosing [facility]       Thank you for choosing [facility] for your care. Our goal is always to provide you with excellent care. Hearing back from our patients is one way we can continue to  improve our services. Please take a few minutes to complete the written survey that you may receive in the mail after you visit with us. Thank you!        [employer] Information     [employer] gives you secure access to your electronic health record. If you see a primary care provider, you can also send messages to your care team and make appointments. If you have questions, please call your primary care clinic.  If you do not have a primary care provider, please call [phone] and they will assist you.        Care EveryWhere ID     This is your Care EveryWhere ID. This could be used by other organizations to access your [facility] medical records  [id number]        Equal Access to Services     [facility]FFERY [facility] Center[facility] : Omayra Penny, renny aldrich, reginald wong. So [facility] [phone].    ATENCIÓN: Si habla español, tiene a cabrales disposición servicios gratuitos de asistencia lingüística. Llame al [phone].    We comply with applicable federal civil rights laws and [place] laws. We do not discriminate on the basis of race, color, national origin, age, disability, sex, sexual orientation, or gender identity.            After Visit Summary       This is your record. Keep this with you and show to your community pharmacist(s) and doctor(s) at your next visit.

## 2018-04-19 ENCOUNTER — HOSPITAL ENCOUNTER (OUTPATIENT)
Dept: PHYSICAL THERAPY | Facility: CLINIC | Age: 43
Setting detail: THERAPIES SERIES
End: 2018-04-19
Attending: PODIATRIST
Payer: MEDICARE

## 2018-04-19 PROCEDURE — 97110 THERAPEUTIC EXERCISES: CPT | Mod: GP | Performed by: PHYSICAL THERAPIST

## 2018-04-19 PROCEDURE — 97162 PT EVAL MOD COMPLEX 30 MIN: CPT | Mod: GP | Performed by: PHYSICAL THERAPIST

## 2018-04-19 PROCEDURE — G8979 MOBILITY GOAL STATUS: HCPCS | Mod: GP,CI | Performed by: PHYSICAL THERAPIST

## 2018-04-19 PROCEDURE — G8978 MOBILITY CURRENT STATUS: HCPCS | Mod: GP,CK | Performed by: PHYSICAL THERAPIST

## 2018-04-19 PROCEDURE — 97140 MANUAL THERAPY 1/> REGIONS: CPT | Mod: GP | Performed by: PHYSICAL THERAPIST

## 2018-04-19 PROCEDURE — 40000718 ZZHC STATISTIC PT DEPARTMENT ORTHO VISIT: Performed by: PHYSICAL THERAPIST

## 2018-04-19 PROCEDURE — G8980 MOBILITY D/C STATUS: HCPCS | Mod: GP,CK | Performed by: PHYSICAL THERAPIST

## 2018-04-20 DIAGNOSIS — M05.79 RHEUMATOID ARTHRITIS INVOLVING MULTIPLE SITES WITH POSITIVE RHEUMATOID FACTOR (H): ICD-10-CM

## 2018-04-23 NOTE — PROGRESS NOTES
04/19/18 1400   General Information   Type of Visit Initial OP Ortho PT Evaluation   Start of Care Date 04/19/18   Referring Physician Clint Simon DPM   Patient/Family Goals Statement reduce pain   Orders Evaluate and Treat   Orders Comment s/p L ankle ligamnet repair   Date of Order 04/17/18   Insurance Type Medicare;Other   Insurance Comments/Visits Authorized [employer]   Medical Diagnosis ankle instability, ankle pain   Surgical/Medical history reviewed Yes   Precautions/Limitations no known precautions/limitations   Body Part(s)   Body Part(s) Ankle/Foot   Presentation and Etiology   Pertinent history of current problem (include personal factors and/or comorbidities that impact the POC) Pt relates she had surgery on L ankle on 1/30/18. Pt relates she did fall in the shower shortly after and heard a pop. She is unsure if it re-tore anything but she continues to have a lot of pain on the top of her foot. Pt relates MD is aware.  Pt also c/o of incision is not yet healed on lateral side of foot and Dr. Liz may have to complete a debridement. Pt was NWB for approx 2 months and has been wearing a CAM boot for approx 1 month. Pt relates she can wean to a tri-lock ankle brace however it increases her pain. Pt relates she walks around w/o supportive device on ankle. Pt relates she has neuropathy on L and has foot drop due to LBP.  Pt relates they have not discussed an AFO. Pt relates she was just approved for disability due to LBP she has had multiple fusions/surgeries and has another one possibly coming up. Tolerance to standing due to LBP is 15 min, pt also relates R knee occasionally gives out.  Pt will she LB MD on 5/11/18. Pt will f/u with Dr. Simon on 6/11/18. PMH: anemia, broken R ankle, degenerative disc diseases, arthritis, rheumatoid arthritis, smoking, L5-S1 fusion 2015, L3-L4 fusion 2017, R ankle surgery w hardware due to fx    Impairments A. Pain;B. Decreased WB tolerance;C. Swelling;D. Decreased  ROM;E. Decreased flexibility;F. Decreased strength and endurance;G. Impaired balance;H. Impaired gait;K. Numbness;L. Tingling   Functional Limitations perform activities of daily living   Symptom Location L foot   How/Where did it occur Other   Onset date of current episode/exacerbation 01/30/18   Chronicity Chronic   Pain rating (0-10 point scale) Best (/10);Worst (/10)   Best (/10) 3   Worst (/10) 7   Pain quality A. Sharp;C. Aching;E. Shooting;F. Stabbing;H. Other   Pain quality comment cramping   Frequency of pain/symptoms A. Constant   Pain/symptoms exacerbated by A. Sitting;B. Walking;C. Lifting;D. Carrying;E. Rest;G. Certain positions;I. Bending;J. ADL;K. Home tasks   Pain/symptoms eased by D. Nothing;C. Rest;H. Cold;J. Braces/supports   Progression of symptoms since onset: Worsened   Current Level of Function   Current Community Support Family/friend caregiver   Patient role/employment history G. Disabled   Living environment [facility]   Fall Risk Screen   Fall screen completed by PT   Have you fallen 2 or more times in the past year? Yes   Have you fallen and had an injury in the past year? Yes   Timed Up and Go score (seconds) 12.9 secs   Is patient a fall risk? Yes   Fall screen comments Pt given handout, MD is aware    Functional Scales   Functional Scales Other   Other Scales  LEFS: 23/80   Ankle/Foot Objective Findings   Side (if bilateral, select both right and left) Left   Observation arch WNL - F8: 55 cm met heads:23.5 cm   Integumentary mod swelling through out, lateral incsion not healed and pt relates drainage - covered w baind aid, mod scare tissue noted    Gait/Locomotion walks w CAM Boot- able to walk w/o however signifincat limp note w increased jo-ann, no AD    Balance/ Proprioception (Single Leg Stance) unable to balance on L foot    Ankle/Foot Strength Comments knee flex: 4/5, knee ext: 4/5    Palpation TTP throughout foot but able to feel light touch    Left DF (Knee Ext) AROM A: 0 P:  8 deg   Left PF AROM A: 50    Left Calcanceal Inversion AROM 10   Left Calcaneal Eversion AROM 2   Left DF/Inversion Strength 2/5   Left DF/Eversion Strength 1/5   Left PF/Inversion Strength 2/5   Left PF/Eversion Strength 1/5   Left PF Strength 3/5   Planned Therapy Interventions   Planned Therapy Interventions balance training;joint mobilization;manual therapy;gait training;neuromuscular re-education;ROM;strengthening;stretching   Planned Modality Interventions   Planned Modality Interventions Cryotherapy;Hot packs;TENS   Clinical Impression   Criteria for Skilled Therapeutic Interventions Met yes, treatment indicated   PT Diagnosis decreased strength and ROM s/p ankle ligament repair   Influenced by the following impairments weakness, limited ROM, pain   Functional limitations due to impairments balance, mobility, stairs   Clinical Presentation Evolving/Changing   Clinical Presentation Rationale Pt is pleasant 42 yr old female s/p ligament repair. Pt has multiple co-morbidiites affecting tx and relates she may have retore tendon   Clinical Decision Making (Complexity) Moderate complexity   Therapy Frequency 2 times/Week   Predicted Duration of Therapy Intervention (days/wks) 8 weeks   Risk & Benefits of therapy have been explained Yes   Patient, Family & other staff in agreement with plan of care Yes   Education Assessment   Preferred Learning Style Listening;Reading;Demonstration;Pictures/video   Barriers to Learning No barriers   ORTHO GOALS   PT Ortho Eval Goals 1;2;3;4   Ortho Goal 1   Goal Identifier LE strength    Goal Description Pt will demonstrate 5/5 LE strength to be able to complete floor transfers   Target Date 05/17/18   Ortho Goal 2   Goal Identifier walking w/o ROM   Goal Description Pt will demonstrate 10+ deg of DF to be able to walk with normal gait mechanics   Target Date 05/17/18   Ortho Goal 3   Goal Identifier balance    Goal Description Pt will be able to stand on either LE for 10+ secs to  demonstrate improved balance and reduce risk of falls   Target Date 06/14/18   Ortho Goal 4   Goal Identifier LEFS   Goal Description Pt will score 55 or higher on LEFS outcome tool to demonstrate clinically signifincat improvement.    Target Date 06/14/18   Total Evaluation Time   Total Evaluation Time 50(20 eval, 1 TE, 1MT)    Therapy Certification   Certification date from 04/19/18   Certification date to 06/14/18   Medical Diagnosis ankle instability, ankle pain      Marguerite Carmichael  Physical Therapist  [place] [id number]  [email or web address]   [email or web address]   Office: [phone] Fax: [phone]

## 2018-04-23 NOTE — TELEPHONE ENCOUNTER
Signed Prescriptions:                        Disp   Refills    methotrexate sodium 50 MG/2ML SOLN         1 vial 0        Sig: Inject 0.8 mLs (20 mg) as directed every 7 days           Office visit for further refills.  Authorizing Provider: BASSAM DOWLING  Ordering User: SANTOS BETH RN refilled medication per [facility] Refill Protocol- patient has 3 mth f/u visit scheduled with provider for 4/25/18, refilled one months worth supply.     Santos Beth RN

## 2018-04-23 NOTE — PROGRESS NOTES
[facility]          OUTPATIENT PHYSICAL THERAPY ORTHOPEDIC EVALUATION  PLAN OF TREATMENT FOR OUTPATIENT REHABILITATION  (COMPLETE FOR INITIAL CLAIMS ONLY)  Patient's Last Name, First Name, M.I.  YOB: 1975  KristyMaris WEBSTER    Provider s Name:  [facility]   Medical Record No.  [id number]   Start of Care Date:  04/19/18   Onset Date:  01/30/18   Type:     _X__PT   ___OT   ___SLP Medical Diagnosis:  ankle instability, ankle pain      PT Diagnosis:  decreased strength and ROM s/p ankle ligament repair   Visits from SOC:  1      _________________________________________________________________________________  Plan of Treatment/Functional Goals:  balance training, joint mobilization, manual therapy, gait training, neuromuscular re-education, ROM, strengthening, stretching     Cryotherapy, Hot packs, TENS     Goals  Goal Identifier: LE strength   Goal Description: Pt will demonstrate 5/5 LE strength to be able to complete floor transfers  Target Date: 05/17/18    Goal Identifier: walking w/o ROM  Goal Description: Pt will demonstrate 10+ deg of DF to be able to walk with normal gait mechanics  Target Date: 05/17/18    Goal Identifier: balance   Goal Description: Pt will be able to stand on either LE for 10+ secs to demonstrate improved balance and reduce risk of falls  Target Date: 06/14/18    Goal Identifier: LEFS  Goal Description: Pt will score 55 or higher on LEFS outcome tool to demonstrate clinically signifincat improvement.   Target Date: 06/14/18               Therapy Frequency:  2 times/Week  Predicted Duration of Therapy Intervention:  8 weeks    Margueriet Carmichael, PT                 I CERTIFY THE NEED FOR THESE SERVICES FURNISHED UNDER        THIS PLAN OF TREATMENT AND WHILE UNDER MY CARE .             Physician Signature               Date    X_____________________________________________________                             Certification Date From:   04/19/18   Certification Date To:  06/14/18    Referring Provider:  Clint Simon DPM    Initial Assessment        See Epic Evaluation Start of Care Date: 04/19/18

## 2018-05-21 NOTE — PROGRESS NOTES
Outpatient Physical Therapy Discharge Note     Patient: Maris Wagner  : 1975    Evaluation date:  18    Referring Provider: Dr. Simon    Therapy Diagnosis: decreased strength and ROM s/p ankle ligament repair    Client Self Report:   Current status is unknown since patient did not return for further PT visits.    Objective Measurements:  Current objective status is unknown since patient failed to complete treatment     Goals:  Goal Identifier LE strength    Goal Description Pt will demonstrate 5/5 LE strength to be able to complete floor transfers   Target Date 18   Date Met      Progress:unable to assess as pt failed to return for f/u visits      Goal Identifier walking w/o ROM   Goal Description Pt will demonstrate 10+ deg of DF to be able to walk with normal gait mechanics   Target Date 18   Date Met      Progress:unable to assess as pt failed to return for f/u visits      Goal Identifier balance    Goal Description Pt will be able to stand on either LE for 10+ secs to demonstrate improved balance and reduce risk of falls   Target Date 18   Date Met      Progress:unable to assess as pt failed to return for f/u visits      Goal Identifier LEFS   Goal Description Pt will score 55 or higher on LEFS outcome tool to demonstrate clinically signifincat improvement.    Target Date 18   Date Met      Progress:unable to assess as pt failed to return for f/u visits        Progress Toward Goals:   Progress this reporting period: Pt has failed to schedule f/u visits within 30 days from last visit thus is being d/c from therapy at this time. Objective measures are all taken from last visit. Current status is unknown at this time.          Plan:  Discharge from therapy.    Discharge:    Reason for Discharge: Patient has failed to schedule further appointments.      Equipment Issued: none    Discharge Plan:  Not completed since patient failed to schedule further appointments.    Marguerite Carmichael   Physical Therapist #[id number]  [facility]  [address] [place] [address]  [email or web address]   [email or web address]   Office: [phone] Fax: [phone]

## 2018-05-25 ENCOUNTER — RADIANT APPOINTMENT (OUTPATIENT)
Dept: GENERAL RADIOLOGY | Facility: CLINIC | Age: 43
End: 2018-05-25
Attending: NURSE PRACTITIONER
Payer: MEDICARE

## 2018-05-25 ENCOUNTER — OFFICE VISIT (OUTPATIENT)
Dept: FAMILY MEDICINE | Facility: CLINIC | Age: 43
End: 2018-05-25
Payer: MEDICARE

## 2018-05-25 VITALS
HEART RATE: 60 BPM | BODY MASS INDEX: 44.3 KG/M2 | WEIGHT: 266.2 LBS | DIASTOLIC BLOOD PRESSURE: 78 MMHG | TEMPERATURE: 98.2 F | SYSTOLIC BLOOD PRESSURE: 136 MMHG | RESPIRATION RATE: 20 BRPM

## 2018-05-25 DIAGNOSIS — S60.221A CONTUSION OF RIGHT HAND, INITIAL ENCOUNTER: ICD-10-CM

## 2018-05-25 DIAGNOSIS — S69.91XA INJURY OF HAND, RIGHT, INITIAL ENCOUNTER: ICD-10-CM

## 2018-05-25 DIAGNOSIS — S66.911A STRAIN OF RIGHT HAND, INITIAL ENCOUNTER: Primary | ICD-10-CM

## 2018-05-25 PROCEDURE — 99214 OFFICE O/P EST MOD 30 MIN: CPT | Performed by: NURSE PRACTITIONER

## 2018-05-25 PROCEDURE — 73130 X-RAY EXAM OF HAND: CPT | Mod: RT

## 2018-05-25 RX ORDER — HYDROCODONE BITARTRATE AND ACETAMINOPHEN 5; 325 MG/1; MG/1
1 TABLET ORAL EVERY 6 HOURS PRN
Qty: 12 TABLET | Refills: 0 | Status: SHIPPED | OUTPATIENT
Start: 2018-05-25 | End: 2018-12-04

## 2018-05-25 ASSESSMENT — PAIN SCALES - GENERAL: PAINLEVEL: EXTREME PAIN (8)

## 2018-05-25 NOTE — NURSING NOTE
"Chief Complaint   Patient presents with     Hand Injury       Initial /78 (BP Location: Left arm, Cuff Size: Adult Large)  Pulse 60  Temp 98.2  F (36.8  C) (Tympanic)  Resp 20  Wt 266 lb 3.2 oz (120.7 kg)  BMI 44.3 kg/m2 Estimated body mass index is 44.3 kg/(m^2) as calculated from the following:    Height as of 10/26/17: 5' 5\" (1.651 m).    Weight as of this encounter: 266 lb 3.2 oz (120.7 kg).      Health Maintenance that is potentially due pending provider review:  NONE    n/a    Is there anyone who you would like to be able to receive your results? Not Applicable  If yes have patient fill out CM  Donte Johnson M.A.      "

## 2018-05-25 NOTE — PROGRESS NOTES
SUBJECTIVE:   Maris Wagner is a 42 year old female who presents to clinic today for the following health issues:    Joint Pain    Onset: Today    Description:   Location: Right hand   Character: Sharp and Burning    Intensity: severe    Progression of Symptoms: same    Accompanying Signs & Symptoms:  Other symptoms: numbness, tingling, swelling and discoloration of knuckles     History:   Previous similar pain: no       Precipitating factors:   Trauma or overuse: YES- Dropped a bed on her hand.      Alleviating factors:  Improved by: nothing    Therapies Tried and outcome: None       Problem list and histories reviewed & adjusted, as indicated.  Additional history: as documented    Patient Active Problem List   Diagnosis     CARDIOVASCULAR SCREENING; LDL GOAL LESS THAN 130     Ileus, postoperative (H)     Spinal stenosis of lumbar region     Anemia     Gastroesophageal reflux disease     Irritable bowel syndrome     Seasonal allergies     Obesity     S/P lumbar discectomy     Chronic back pain     Recurrent herniation of lumbar disc     Herniation of lumbar intervertebral disc without myelopathy     Rheumatoid arthritis involving multiple sites with positive rheumatoid factor (H)     Past Surgical History:   Procedure Laterality Date     ARTHROSCOPY ANKLE, OPEN REPAIR LIGAMENT, COMBINED Right 8/3/2017    Procedure: COMBINED ARTHROSCOPY ANKLE, OPEN REPAIR LIGAMENT;  Right Ankle Arthroscopic Evaluation and Debridement,Lateral Ligament Repair,Fracture Evaluation, Open Reduction Internal Fixation ;  Surgeon: Clint Simon DPM;  Location: [facility]     OPEN REDUCTION INTERNAL FIXATION ANKLE Right 8/3/2017    Procedure: OPEN REDUCTION INTERNAL FIXATION ANKLE;;  Surgeon: Clint Simon DPM;  Location: [facility]     REMOVE FOREIGN BODY FINGER Left 3/28/2017    Procedure: REMOVE FOREIGN BODY FINGER;  Surgeon: Tabby Chan MD;  Location: [facility] OR     TUBAL LIGATION         Social History   Substance Use Topics  "    Smoking status: Current Every Day Smoker     Packs/day: 0.50     Years: 25.00     Types: Cigarettes     Smokeless tobacco: Never Used     Alcohol use Yes      Comment: rare     Family History   Problem Relation Age of Onset     DIABETES Mother      pre diabetes     Arthritis Mother      Hypertension Father      CANCER Maternal Grandmother      CANCER Maternal Grandfather      CANCER Paternal Grandmother      CANCER Paternal Grandfather      mesothelioma         Current Outpatient Prescriptions   Medication Sig Dispense Refill     albuterol (PROAIR HFA/PROVENTIL HFA/VENTOLIN HFA) 108 (90 BASE) MCG/ACT Inhaler Inhale 2 puffs into the lungs every 6 hours       famotidine (PEPCID) 40 MG tablet Take 1 tablet (40 mg) by mouth At Bedtime 90 tablet 0     methotrexate sodium 50 MG/2ML SOLN Inject 0.8 mLs (20 mg) as directed every 7 days Office visit for further refills. 1 vial 0     pregabalin (LYRICA) 75 MG capsule Take 75 mg by mouth 2 times daily       vitamin D (ERGOCALCIFEROL) 57612 UNIT capsule Take 1 capsule (50,000 Units) by mouth every 7 days 12 capsule 1     Insulin Syringe-Needle U-100 (BD INSULIN SYRINGE) 27G X 1/2\" 1 ML MISC For once weekly methotrexate administration (Patient not taking: Reported on 5/25/2018) 10 each 3     oxyCODONE-acetaminophen (PERCOCET) 5-325 MG per tablet Take 1 tablet by mouth every 4 hours as needed for moderate to severe pain       Allergies   Allergen Reactions     Nka [No Known Allergies]        Reviewed and updated as needed this visit by clinical staff       Reviewed and updated as needed this visit by Provider         ROS:  Constitutional, HEENT, cardiovascular, pulmonary, gi and gu systems are negative, except as otherwise noted.    OBJECTIVE:     /78 (BP Location: Left arm, Cuff Size: Adult Large)  Pulse 60  Temp 98.2  F (36.8  C) (Tympanic)  Resp 20  Wt 266 lb 3.2 oz (120.7 kg)  BMI 44.3 kg/m2  Body mass index is 44.3 kg/(m^2).   GENERAL: healthy, alert and no " distress  EYES: Eyes grossly normal to inspection, PERRL and conjunctivae and sclerae normal  NECK: no adenopathy, no asymmetry, masses, or scars and thyroid normal to palpation  RESP: lungs clear to auscultation - no rales, rhonchi or wheezes  CV: regular rate and rhythm, normal S1 S2, no S3 or S4, no murmur, click or rub, no peripheral edema and peripheral pulses strong  MS: no gross musculoskeletal defects noted, no edema  MS: Hand exam: soft tissue tenderness and swelling at the base of  2nd and 3rd metatarsal , scaphoid (snuffbox) tenderness absent, ecchymosis of at the base of the 3rd metatarsal, radial pulse normal, sensation normal, remainder of ipsilateral wrist, hand and finger exam is normal, normal contralateral hand and wrist.  SKIN: no suspicious lesions or rashes  NEURO: Normal strength and tone, mentation intact and speech normal  PSYCH: mentation appears normal, affect normal/bright      RIGHT HAND THREE OR MORE VIEWS  5/25/2018 1:18 PM      HISTORY:  Injury of hand, right, initial encounter.     COMPARISON: None.         IMPRESSION: Bones are normally aligned. No acute fracture.  ASSESSMENT:       ICD-10-CM    1. Strain of right hand, initial encounter S66.911A order for DME   2. Contusion of right hand, initial encounter S60.221A    3. Injury of hand, right, initial encounter S69.91XA XR Hand Right G/E 3 Views     HYDROcodone-acetaminophen (NORCO) 5-325 MG per tablet         PLAN:     Patient Instructions   Rest the affected painful area as much as possible.  Apply ice for 15-20 minutes intermittently as needed and especially after any offending activity.    Daily stretching.  As pain recedes, begin normal activities slowly as tolerated.      Avoid rapid or heavy exertion for now. Activity as tolerated     Gentle stretching two to three times daily just to keep from stiffening up.      Alternate ice and heat, 20 minutes each for 2-3 cycles.  Gel packs work best for cold. Uncooked rice is best for  heat.  Fill an old, clean sock and tie or sew up.  Microwave for 30-45 seconds. Careful not to burn.    Gradually ease back into activity as it gets better.  Consider Physical Therapy if symptoms not better with symptomatic care. Will need to follow-up with your primary care provider for a referral if not improving,           Hand Contusion  You have a contusion. This is also called a bruise. There is swelling and some bleeding under the skin, but no broken bones. This injury generally takes a few days to a few weeks to heal.  During that time, the bruise will typically change in color from reddish, to purple-blue, to greenish-yellow, then to yellow-brown.  Home care    Elevate the hand to reduce pain and swelling. As much as possible, sit or lie down with the hand raised about the level of your heart. This is especially important during the first 48 hours.    Ice the hand to help reduce pain and swelling. Wrap a cold source (ice pack or ice cubes in a plastic bag) in a thin towel. Apply to the bruised area for 20 minutes every 1 to 2 hours the first day. Continue this 3 to 4 times a day until the pain and swelling goes away.    Unless another medicine was prescribed, you can take acetaminophen, ibuprofen, or naproxen to control pain. (If you have chronic liver or kidney disease or ever had a stomach ulcer or gastrointestinal bleeding, talk with your doctor before using these medicines.)  Follow up  Follow up with your healthcare provider or our staff as advised. Call if you are not improving within 1 to 2 weeks.  When to seek medical advice   Call your healthcare provider right away if you have any of the following:    Increased pain or swelling    Arm becomes cold, blue, numb or tingly    Signs of infection: Warmth, drainage, or increased redness or pain around the bruise    Inability to move the injured hand     Frequent bruising for unknown reasons  Date Last Reviewed: 2/1/2017 2000-2017 [employer],  [employer]. [address] [place] [id number]. All rights reserved. This information is not intended as a substitute for professional medical care. Always follow your healthcare professional's instructions.        Hand Sprain  A sprain is a stretching or tearing of the ligaments that hold a joint together. There are no broken bones. Sprains take 3 to 6 weeks to heal. A sprained hand may be treated with a splint or elastic wrap for support.  Home care    Keep your arm elevated to reduce pain and swelling. This is most important during the first 48 hours.    Apply an ice pack over the injured area for 15 to 20 minutes every 3 to 6 hours. You should do this for the first 24 to 48 hours. You can make an ice pack by filling a plastic bag that seals at the top with ice cubes and then wrapping it with a thin towel. Continue the use of ice packs for relief of pain and swelling as needed. As the ice melts, be careful to avoid getting any wrap or splint wet. After 48 hours, apply heat (warm shower or warm bath) for 15 to 20 minutes several times a day, or alternate ice and heat.    You may use over-the-counter pain medicine to control pain, unless another pain medicine was prescribed. If you have chronic liver or kidney disease or ever had a stomach ulcer or GI bleeding, talk with your healthcare provider before using these medicines.    If you were given a splint or elastic wrap, wear it until your pain improves.  Follow-up care  Follow up with your healthcare provider as advised. Sometimes fractures don t show up on the first X-ray. Bruises and sprains can sometimes hurt as much as a fracture. These injuries can take time to heal completely. If your symptoms don t improve or they get worse, talk with your healthcare provider. You may need a repeat X-ray.  When to seek medical advice  Call your healthcare provider right away if any of these occur:    Pain or swelling increases    Fingers or hand becomes cold, blue, numb, or  bonnie  Date Last Reviewed: 11/20/2015 2000-2017 [employer]. [address] [facility], [place] [address]. All rights reserved. This information is not intended as a substitute for professional medical care. Always follow your healthcare professional's instructions.            [facility] SAME DAY PROVIDER  [facility]

## 2018-05-25 NOTE — PATIENT INSTRUCTIONS
Rest the affected painful area as much as possible.  Apply ice for 15-20 minutes intermittently as needed and especially after any offending activity.    Daily stretching.  As pain recedes, begin normal activities slowly as tolerated.      Avoid rapid or heavy exertion for now. Activity as tolerated     Gentle stretching two to three times daily just to keep from stiffening up.      Alternate ice and heat, 20 minutes each for 2-3 cycles.  Gel packs work best for cold. Uncooked rice is best for heat.  Fill an old, clean sock and tie or sew up.  Microwave for 30-45 seconds. Careful not to burn.    Gradually ease back into activity as it gets better.  Consider Physical Therapy if symptoms not better with symptomatic care. Will need to follow-up with your primary care provider for a referral if not improving,           Hand Contusion  You have a contusion. This is also called a bruise. There is swelling and some bleeding under the skin, but no broken bones. This injury generally takes a few days to a few weeks to heal.  During that time, the bruise will typically change in color from reddish, to purple-blue, to greenish-yellow, then to yellow-brown.  Home care    Elevate the hand to reduce pain and swelling. As much as possible, sit or lie down with the hand raised about the level of your heart. This is especially important during the first 48 hours.    Ice the hand to help reduce pain and swelling. Wrap a cold source (ice pack or ice cubes in a plastic bag) in a thin towel. Apply to the bruised area for 20 minutes every 1 to 2 hours the first day. Continue this 3 to 4 times a day until the pain and swelling goes away.    Unless another medicine was prescribed, you can take acetaminophen, ibuprofen, or naproxen to control pain. (If you have chronic liver or kidney disease or ever had a stomach ulcer or gastrointestinal bleeding, talk with your doctor before using these medicines.)  Follow up  Follow up with your  healthcare provider or our staff as advised. Call if you are not improving within 1 to 2 weeks.  When to seek medical advice   Call your healthcare provider right away if you have any of the following:    Increased pain or swelling    Arm becomes cold, blue, numb or tingly    Signs of infection: Warmth, drainage, or increased redness or pain around the bruise    Inability to move the injured hand     Frequent bruising for unknown reasons  Date Last Reviewed: 2/1/2017 2000-2017 [employer]. [address] [place], [place] [id number]. All rights reserved. This information is not intended as a substitute for professional medical care. Always follow your healthcare professional's instructions.        Hand Sprain  A sprain is a stretching or tearing of the ligaments that hold a joint together. There are no broken bones. Sprains take 3 to 6 weeks to heal. A sprained hand may be treated with a splint or elastic wrap for support.  Home care    Keep your arm elevated to reduce pain and swelling. This is most important during the first 48 hours.    Apply an ice pack over the injured area for 15 to 20 minutes every 3 to 6 hours. You should do this for the first 24 to 48 hours. You can make an ice pack by filling a plastic bag that seals at the top with ice cubes and then wrapping it with a thin towel. Continue the use of ice packs for relief of pain and swelling as needed. As the ice melts, be careful to avoid getting any wrap or splint wet. After 48 hours, apply heat (warm shower or warm bath) for 15 to 20 minutes several times a day, or alternate ice and heat.    You may use over-the-counter pain medicine to control pain, unless another pain medicine was prescribed. If you have chronic liver or kidney disease or ever had a stomach ulcer or GI bleeding, talk with your healthcare provider before using these medicines.    If you were given a splint or elastic wrap, wear it until your pain improves.  Follow-up  care  Follow up with your healthcare provider as advised. Sometimes fractures don t show up on the first X-ray. Bruises and sprains can sometimes hurt as much as a fracture. These injuries can take time to heal completely. If your symptoms don t improve or they get worse, talk with your healthcare provider. You may need a repeat X-ray.  When to seek medical advice  Call your healthcare provider right away if any of these occur:    Pain or swelling increases    Fingers or hand becomes cold, blue, numb, or tingly  Date Last Reviewed: 11/20/2015 2000-2017 [employer] [address] [place] [id number]. All rights reserved. This information is not intended as a substitute for professional medical care. Always follow your healthcare professional's instructions.

## 2018-05-25 NOTE — MR AVS SNAPSHOT
After Visit Summary   5/25/2018    Maris Wagner    MRN: [id number]           Patient Information     Date Of Birth          1975        Visit Information        Provider Department      5/25/2018 1:00 PM Judy Howard APRN [facility]        Today's Diagnoses     Injury of hand, right, initial encounter    -  1    Strain of right hand, initial encounter        Contusion of right hand, initial encounter          Care Instructions    Rest the affected painful area as much as possible.  Apply ice for 15-20 minutes intermittently as needed and especially after any offending activity.    Daily stretching.  As pain recedes, begin normal activities slowly as tolerated.      Avoid rapid or heavy exertion for now. Activity as tolerated     Gentle stretching two to three times daily just to keep from stiffening up.      Alternate ice and heat, 20 minutes each for 2-3 cycles.  Gel packs work best for cold. Uncooked rice is best for heat.  Fill an old, clean sock and tie or sew up.  Microwave for 30-45 seconds. Careful not to burn.    Gradually ease back into activity as it gets better.  Consider Physical Therapy if symptoms not better with symptomatic care. Will need to follow-up with your primary care provider for a referral if not improving,           Hand Contusion  You have a contusion. This is also called a bruise. There is swelling and some bleeding under the skin, but no broken bones. This injury generally takes a few days to a few weeks to heal.  During that time, the bruise will typically change in color from reddish, to purple-blue, to greenish-yellow, then to yellow-brown.  Home care    Elevate the hand to reduce pain and swelling. As much as possible, sit or lie down with the hand raised about the level of your heart. This is especially important during the first 48 hours.    Ice the hand to help reduce pain and swelling. Wrap a cold source (ice pack or ice cubes in a  plastic bag) in a thin towel. Apply to the bruised area for 20 minutes every 1 to 2 hours the first day. Continue this 3 to 4 times a day until the pain and swelling goes away.    Unless another medicine was prescribed, you can take acetaminophen, ibuprofen, or naproxen to control pain. (If you have chronic liver or kidney disease or ever had a stomach ulcer or gastrointestinal bleeding, talk with your doctor before using these medicines.)  Follow up  Follow up with your healthcare provider or our staff as advised. Call if you are not improving within 1 to 2 weeks.  When to seek medical advice   Call your healthcare provider right away if you have any of the following:    Increased pain or swelling    Arm becomes cold, blue, numb or tingly    Signs of infection: Warmth, drainage, or increased redness or pain around the bruise    Inability to move the injured hand     Frequent bruising for unknown reasons  Date Last Reviewed: 2/1/2017 2000-2017 [employer]. [address] [place] [address]. All rights reserved. This information is not intended as a substitute for professional medical care. Always follow your healthcare professional's instructions.        Hand Sprain  A sprain is a stretching or tearing of the ligaments that hold a joint together. There are no broken bones. Sprains take 3 to 6 weeks to heal. A sprained hand may be treated with a splint or elastic wrap for support.  Home care    Keep your arm elevated to reduce pain and swelling. This is most important during the first 48 hours.    Apply an ice pack over the injured area for 15 to 20 minutes every 3 to 6 hours. You should do this for the first 24 to 48 hours. You can make an ice pack by filling a plastic bag that seals at the top with ice cubes and then wrapping it with a thin towel. Continue the use of ice packs for relief of pain and swelling as needed. As the ice melts, be careful to avoid getting any wrap or splint wet.  After 48 hours, apply heat (warm shower or warm bath) for 15 to 20 minutes several times a day, or alternate ice and heat.    You may use over-the-counter pain medicine to control pain, unless another pain medicine was prescribed. If you have chronic liver or kidney disease or ever had a stomach ulcer or GI bleeding, talk with your healthcare provider before using these medicines.    If you were given a splint or elastic wrap, wear it until your pain improves.  Follow-up care  Follow up with your healthcare provider as advised. Sometimes fractures don t show up on the first X-ray. Bruises and sprains can sometimes hurt as much as a fracture. These injuries can take time to heal completely. If your symptoms don t improve or they get worse, talk with your healthcare provider. You may need a repeat X-ray.  When to seek medical advice  Call your healthcare provider right away if any of these occur:    Pain or swelling increases    Fingers or hand becomes cold, blue, numb, or tingly  Date Last Reviewed: 11/20/2015 2000-2017 [employer]. [address] [place] [address]. All rights reserved. This information is not intended as a substitute for professional medical care. Always follow your healthcare professional's instructions.                Follow-ups after your visit        Your next 10 appointments already scheduled     Aug 23, 2018  1:20 PM CDT   Return Visit with Fransisco Montana MD   [facility] ([facility])    [address] [place] [id number] [phone]              Who to contact     If you have questions or need follow up information about today's clinic visit or your schedule please contact [facility] directly at [phone].  Normal or non-critical lab and imaging results will be communicated to you by MyChart, letter or phone within 4 business days after the clinic has received the results. If you do not hear from us within  7 days, please contact the clinic through [employer] or phone. If you have a critical or abnormal lab result, we will notify you by phone as soon as possible.  Submit refill requests through [employer] or call your pharmacy and they will forward the refill request to us. Please allow 3 business days for your refill to be completed.          Additional Information About Your Visit        [employer] Information     [employer] gives you secure access to your electronic health record. If you see a primary care provider, you can also send messages to your care team and make appointments. If you have questions, please call your primary care clinic.  If you do not have a primary care provider, please call [phone] and they will assist you.        Care EveryWhere ID     This is your Care EveryWhere ID. This could be used by other organizations to access your [facility] medical records  [id number]        Your Vitals Were     Pulse Temperature Respirations BMI (Body Mass Index)          60 98.2  F (36.8  C) (Tympanic) 20 44.3 kg/m2         Blood Pressure from Last 3 Encounters:   05/25/18 136/78   02/08/18 (!) 146/97   01/25/18 118/82    Weight from Last 3 Encounters:   05/25/18 266 lb 3.2 oz (120.7 kg)   02/08/18 240 lb (108.9 kg)   10/26/17 258 lb 3.2 oz (117.1 kg)                 Today's Medication Changes          These changes are accurate as of 5/25/18  1:27 PM.  If you have any questions, ask your nurse or doctor.               Start taking these medicines.        Dose/Directions    HYDROcodone-acetaminophen 5-325 MG per tablet   Commonly known as:  NORCO   Used for:  Injury of hand, right, initial encounter   Started by:  Judy Howard APRN CNP        Dose:  1 tablet   Take 1 tablet by mouth every 6 hours as needed for pain   Quantity:  12 tablet   Refills:  0       order for DME   Used for:  Strain of right hand, initial encounter   Started by:  Judy Howard APRN CNP        Thumb spica   Quantity:  1 Units    Refills:  0            Where to get your medicines      Some of these will need a paper prescription and others can be bought over the counter.  Ask your nurse if you have questions.     Bring a paper prescription for each of these medications     HYDROcodone-acetaminophen 5-325 MG per tablet    order for DME               Information about OPIOIDS     PRESCRIPTION OPIOIDS: WHAT YOU NEED TO KNOW   You have a prescription for an opioid (narcotic) pain medicine. Opioids can cause addiction. If you have a history of chemical dependency of any type, you are at a higher risk of becoming addicted to opioids. Only take this medicine after all other options have been tried. Take it for as short a time and as few doses as possible.     Do not:    Drive. If you drive while taking these medicines, you could be arrested for driving under the influence (DUI).    Operate heavy machinery    Do any other dangerous activities while taking these medicines.     Drink any alcohol while taking these medicines.      Take with any other medicines that contain acetaminophen. Read all labels carefully. Look for the word  acetaminophen  or  Tylenol.  Ask your pharmacist if you have questions or are unsure.    Store your pills in a secure place, locked if possible. We will not replace any lost or stolen medicine. If you don t finish your medicine, please throw away (dispose) as directed by your pharmacist. The [place] [employer] has more information about safe disposal: https://www.pca.[facility].[place].us/living-green/managing-unwanted-medications    All opioids tend to cause constipation. Drink plenty of water and eat foods that have a lot of fiber, such as fruits, vegetables, prune juice, apple juice and high-fiber cereal. Take a laxative (Miralax, milk of magnesia, Colace, Senna) if you don t move your bowels at least every other day.          Primary Care Provider Office Phone # Fax #    Zay Madrid [phone]  "      [address] [address]        Equal Access to Services     JEFFERY CHIANG : Hadii aad ku hadraleigheliz Sodiane, wacharisda luqadaha, qaybta kaalmada rajansimón, waxay idiin hayrafiniesha gutierrezdiegoglen paula. So [facility] [phone].    ATENCIÓN: Si habla español, tiene a cabrales disposición servicios gratuitos de asistencia lingüística. Juan al [phone].    We comply with applicable federal civil rights laws and [place] laws. We do not discriminate on the basis of race, color, national origin, age, disability, sex, sexual orientation, or gender identity.            Thank you!     Thank you for choosing [facility]  for your care. Our goal is always to provide you with excellent care. Hearing back from our patients is one way we can continue to improve our services. Please take a few minutes to complete the written survey that you may receive in the mail after your visit with us. Thank you!             Your Updated Medication List - Protect others around you: Learn how to safely use, store and throw away your medicines at www.disposemymeds.org.          This list is accurate as of 5/25/18  1:27 PM.  Always use your most recent med list.                   Brand Name Dispense Instructions for use Diagnosis    albuterol 108 (90 Base) MCG/ACT Inhaler    PROAIR HFA/PROVENTIL HFA/VENTOLIN HFA     Inhale 2 puffs into the lungs every 6 hours        famotidine 40 MG tablet    PEPCID    90 tablet    Take 1 tablet (40 mg) by mouth At Bedtime    Gastroesophageal reflux disease without esophagitis       HYDROcodone-acetaminophen 5-325 MG per tablet    NORCO    12 tablet    Take 1 tablet by mouth every 6 hours as needed for pain    Injury of hand, right, initial encounter       Insulin Syringe-Needle U-100 27G X 1/2\" 1 ML Misc    BD insulin syringe    10 each    For once weekly methotrexate administration    Rheumatoid arthritis involving multiple sites with positive rheumatoid " factor (H)       LYRICA 75 MG capsule   Generic drug:  pregabalin      Take 75 mg by mouth 2 times daily        methotrexate sodium 50 MG/2ML Soln     1 vial    Inject 0.8 mLs (20 mg) as directed every 7 days Office visit for further refills.    Rheumatoid arthritis involving multiple sites with positive rheumatoid factor (H)       order for DME     1 Units    Thumb spica    Strain of right hand, initial encounter       oxyCODONE-acetaminophen 5-325 MG per tablet    PERCOCET     Take 1 tablet by mouth every 4 hours as needed for moderate to severe pain        vitamin D 59354 UNIT capsule    ERGOCALCIFEROL    12 capsule    Take 1 capsule (50,000 Units) by mouth every 7 days    Vitamin D deficiency

## 2018-06-26 ENCOUNTER — HOSPITAL ENCOUNTER (EMERGENCY)
Facility: CLINIC | Age: 43
Discharge: ANOTHER HOSPITAL | End: 2018-06-27
Attending: STUDENT IN AN ORGANIZED HEALTH CARE EDUCATION/TRAINING PROGRAM | Admitting: STUDENT IN AN ORGANIZED HEALTH CARE EDUCATION/TRAINING PROGRAM
Payer: MEDICARE

## 2018-06-26 DIAGNOSIS — R79.89 ELEVATED TROPONIN: ICD-10-CM

## 2018-06-26 DIAGNOSIS — R06.02 SHORTNESS OF BREATH: ICD-10-CM

## 2018-06-26 DIAGNOSIS — J18.9 PNEUMONIA OF BOTH LUNGS DUE TO INFECTIOUS ORGANISM, UNSPECIFIED PART OF LUNG: ICD-10-CM

## 2018-06-26 DIAGNOSIS — R09.02 HYPOXIA: ICD-10-CM

## 2018-06-26 PROCEDURE — 93010 ELECTROCARDIOGRAM REPORT: CPT | Mod: Z6 | Performed by: STUDENT IN AN ORGANIZED HEALTH CARE EDUCATION/TRAINING PROGRAM

## 2018-06-26 PROCEDURE — 96365 THER/PROPH/DIAG IV INF INIT: CPT | Performed by: STUDENT IN AN ORGANIZED HEALTH CARE EDUCATION/TRAINING PROGRAM

## 2018-06-26 PROCEDURE — 96375 TX/PRO/DX INJ NEW DRUG ADDON: CPT | Performed by: STUDENT IN AN ORGANIZED HEALTH CARE EDUCATION/TRAINING PROGRAM

## 2018-06-26 PROCEDURE — 93005 ELECTROCARDIOGRAM TRACING: CPT | Performed by: STUDENT IN AN ORGANIZED HEALTH CARE EDUCATION/TRAINING PROGRAM

## 2018-06-26 PROCEDURE — 99291 CRITICAL CARE FIRST HOUR: CPT | Mod: 25 | Performed by: STUDENT IN AN ORGANIZED HEALTH CARE EDUCATION/TRAINING PROGRAM

## 2018-06-26 PROCEDURE — 96361 HYDRATE IV INFUSION ADD-ON: CPT | Performed by: STUDENT IN AN ORGANIZED HEALTH CARE EDUCATION/TRAINING PROGRAM

## 2018-06-26 PROCEDURE — 99285 EMERGENCY DEPT VISIT HI MDM: CPT | Mod: 25 | Performed by: STUDENT IN AN ORGANIZED HEALTH CARE EDUCATION/TRAINING PROGRAM

## 2018-06-26 PROCEDURE — 96367 TX/PROPH/DG ADDL SEQ IV INF: CPT | Performed by: STUDENT IN AN ORGANIZED HEALTH CARE EDUCATION/TRAINING PROGRAM

## 2018-06-26 RX ORDER — IPRATROPIUM BROMIDE AND ALBUTEROL SULFATE 2.5; .5 MG/3ML; MG/3ML
3 SOLUTION RESPIRATORY (INHALATION) ONCE
Status: COMPLETED | OUTPATIENT
Start: 2018-06-26 | End: 2018-06-27

## 2018-06-26 RX ORDER — LORATADINE 10 MG/1
10 TABLET ORAL ONCE
Status: COMPLETED | OUTPATIENT
Start: 2018-06-26 | End: 2018-06-27

## 2018-06-26 RX ORDER — METHYLPREDNISOLONE SODIUM SUCCINATE 125 MG/2ML
125 INJECTION, POWDER, LYOPHILIZED, FOR SOLUTION INTRAMUSCULAR; INTRAVENOUS ONCE
Status: COMPLETED | OUTPATIENT
Start: 2018-06-26 | End: 2018-06-27

## 2018-06-27 ENCOUNTER — APPOINTMENT (OUTPATIENT)
Dept: CT IMAGING | Facility: CLINIC | Age: 43
End: 2018-06-27
Attending: STUDENT IN AN ORGANIZED HEALTH CARE EDUCATION/TRAINING PROGRAM
Payer: MEDICARE

## 2018-06-27 VITALS
OXYGEN SATURATION: 99 % | RESPIRATION RATE: 13 BRPM | TEMPERATURE: 99.3 F | WEIGHT: 260 LBS | DIASTOLIC BLOOD PRESSURE: 84 MMHG | SYSTOLIC BLOOD PRESSURE: 115 MMHG | BODY MASS INDEX: 43.27 KG/M2

## 2018-06-27 LAB
ALBUMIN SERPL-MCNC: 2.8 G/DL (ref 3.4–5)
ALP SERPL-CCNC: 74 U/L (ref 40–150)
ALT SERPL W P-5'-P-CCNC: 14 U/L (ref 0–50)
ANION GAP SERPL CALCULATED.3IONS-SCNC: 9 MMOL/L (ref 3–14)
AST SERPL W P-5'-P-CCNC: 13 U/L (ref 0–45)
BASE DEFICIT BLDV-SCNC: 0.9 MMOL/L
BASOPHILS # BLD AUTO: 0 10E9/L (ref 0–0.2)
BASOPHILS NFR BLD AUTO: 0.2 %
BILIRUB SERPL-MCNC: 0.5 MG/DL (ref 0.2–1.3)
BUN SERPL-MCNC: 13 MG/DL (ref 7–30)
CALCIUM SERPL-MCNC: 7.6 MG/DL (ref 8.5–10.1)
CHLORIDE SERPL-SCNC: 105 MMOL/L (ref 94–109)
CO2 SERPL-SCNC: 24 MMOL/L (ref 20–32)
CREAT SERPL-MCNC: 0.88 MG/DL (ref 0.52–1.04)
DIFFERENTIAL METHOD BLD: ABNORMAL
EOSINOPHIL # BLD AUTO: 0.1 10E9/L (ref 0–0.7)
EOSINOPHIL NFR BLD AUTO: 1.1 %
ERYTHROCYTE [DISTWIDTH] IN BLOOD BY AUTOMATED COUNT: 15.6 % (ref 10–15)
GFR SERPL CREATININE-BSD FRML MDRD: 70 ML/MIN/1.7M2
GLUCOSE SERPL-MCNC: 129 MG/DL (ref 70–99)
HCO3 BLDV-SCNC: 25 MMOL/L (ref 21–28)
HCT VFR BLD AUTO: 35.9 % (ref 35–47)
HGB BLD-MCNC: 10.9 G/DL (ref 11.7–15.7)
IMM GRANULOCYTES # BLD: 0 10E9/L (ref 0–0.4)
IMM GRANULOCYTES NFR BLD: 0.2 %
LYMPHOCYTES # BLD AUTO: 1.1 10E9/L (ref 0.8–5.3)
LYMPHOCYTES NFR BLD AUTO: 20.8 %
MCH RBC QN AUTO: 27 PG (ref 26.5–33)
MCHC RBC AUTO-ENTMCNC: 30.4 G/DL (ref 31.5–36.5)
MCV RBC AUTO: 89 FL (ref 78–100)
MONOCYTES # BLD AUTO: 0.4 10E9/L (ref 0–1.3)
MONOCYTES NFR BLD AUTO: 8.2 %
NEUTROPHILS # BLD AUTO: 3.7 10E9/L (ref 1.6–8.3)
NEUTROPHILS NFR BLD AUTO: 69.5 %
NRBC # BLD AUTO: 0 10*3/UL
NRBC BLD AUTO-RTO: 0 /100
NT-PROBNP SERPL-MCNC: 1488 PG/ML (ref 0–450)
PCO2 BLDV: 45 MM HG (ref 40–50)
PH BLDV: 7.35 PH (ref 7.32–7.43)
PLATELET # BLD AUTO: 187 10E9/L (ref 150–450)
PO2 BLDV: 28 MM HG (ref 25–47)
POTASSIUM SERPL-SCNC: 4 MMOL/L (ref 3.4–5.3)
PROT SERPL-MCNC: 6.9 G/DL (ref 6.8–8.8)
RBC # BLD AUTO: 4.03 10E12/L (ref 3.8–5.2)
SODIUM SERPL-SCNC: 138 MMOL/L (ref 133–144)
TROPONIN I SERPL-MCNC: 0.29 UG/L (ref 0–0.04)
TROPONIN I SERPL-MCNC: 0.32 UG/L (ref 0–0.04)
WBC # BLD AUTO: 5.4 10E9/L (ref 4–11)

## 2018-06-27 PROCEDURE — 85025 COMPLETE CBC W/AUTO DIFF WBC: CPT | Performed by: STUDENT IN AN ORGANIZED HEALTH CARE EDUCATION/TRAINING PROGRAM

## 2018-06-27 PROCEDURE — 25000125 ZZHC RX 250: Performed by: STUDENT IN AN ORGANIZED HEALTH CARE EDUCATION/TRAINING PROGRAM

## 2018-06-27 PROCEDURE — A9270 NON-COVERED ITEM OR SERVICE: HCPCS | Mod: GY | Performed by: STUDENT IN AN ORGANIZED HEALTH CARE EDUCATION/TRAINING PROGRAM

## 2018-06-27 PROCEDURE — 71260 CT THORAX DX C+: CPT

## 2018-06-27 PROCEDURE — 94640 AIRWAY INHALATION TREATMENT: CPT

## 2018-06-27 PROCEDURE — 96367 TX/PROPH/DG ADDL SEQ IV INF: CPT | Performed by: STUDENT IN AN ORGANIZED HEALTH CARE EDUCATION/TRAINING PROGRAM

## 2018-06-27 PROCEDURE — 82803 BLOOD GASES ANY COMBINATION: CPT | Performed by: STUDENT IN AN ORGANIZED HEALTH CARE EDUCATION/TRAINING PROGRAM

## 2018-06-27 PROCEDURE — 25000128 H RX IP 250 OP 636: Performed by: STUDENT IN AN ORGANIZED HEALTH CARE EDUCATION/TRAINING PROGRAM

## 2018-06-27 PROCEDURE — 25000132 ZZH RX MED GY IP 250 OP 250 PS 637: Mod: GY | Performed by: STUDENT IN AN ORGANIZED HEALTH CARE EDUCATION/TRAINING PROGRAM

## 2018-06-27 PROCEDURE — 83880 ASSAY OF NATRIURETIC PEPTIDE: CPT | Performed by: STUDENT IN AN ORGANIZED HEALTH CARE EDUCATION/TRAINING PROGRAM

## 2018-06-27 PROCEDURE — 96365 THER/PROPH/DIAG IV INF INIT: CPT | Mod: 59 | Performed by: STUDENT IN AN ORGANIZED HEALTH CARE EDUCATION/TRAINING PROGRAM

## 2018-06-27 PROCEDURE — 80053 COMPREHEN METABOLIC PANEL: CPT | Performed by: STUDENT IN AN ORGANIZED HEALTH CARE EDUCATION/TRAINING PROGRAM

## 2018-06-27 PROCEDURE — 96375 TX/PRO/DX INJ NEW DRUG ADDON: CPT | Mod: 59 | Performed by: STUDENT IN AN ORGANIZED HEALTH CARE EDUCATION/TRAINING PROGRAM

## 2018-06-27 PROCEDURE — 96361 HYDRATE IV INFUSION ADD-ON: CPT | Performed by: STUDENT IN AN ORGANIZED HEALTH CARE EDUCATION/TRAINING PROGRAM

## 2018-06-27 PROCEDURE — 84484 ASSAY OF TROPONIN QUANT: CPT | Performed by: STUDENT IN AN ORGANIZED HEALTH CARE EDUCATION/TRAINING PROGRAM

## 2018-06-27 PROCEDURE — 87040 BLOOD CULTURE FOR BACTERIA: CPT | Mod: 91 | Performed by: STUDENT IN AN ORGANIZED HEALTH CARE EDUCATION/TRAINING PROGRAM

## 2018-06-27 RX ORDER — CEFTRIAXONE SODIUM 2 G/50ML
2 INJECTION, SOLUTION INTRAVENOUS EVERY 24 HOURS
Status: DISCONTINUED | OUTPATIENT
Start: 2018-06-27 | End: 2018-06-27 | Stop reason: HOSPADM

## 2018-06-27 RX ORDER — ALBUTEROL SULFATE 0.83 MG/ML
2.5 SOLUTION RESPIRATORY (INHALATION)
Status: DISCONTINUED | OUTPATIENT
Start: 2018-06-27 | End: 2018-06-27 | Stop reason: HOSPADM

## 2018-06-27 RX ORDER — ASPIRIN 81 MG/1
324 TABLET, CHEWABLE ORAL ONCE
Status: COMPLETED | OUTPATIENT
Start: 2018-06-27 | End: 2018-06-27

## 2018-06-27 RX ORDER — ACETAMINOPHEN 325 MG/1
650 TABLET ORAL ONCE
Status: COMPLETED | OUTPATIENT
Start: 2018-06-27 | End: 2018-06-27

## 2018-06-27 RX ORDER — OXYCODONE HYDROCHLORIDE 5 MG/1
10 TABLET ORAL ONCE
Status: COMPLETED | OUTPATIENT
Start: 2018-06-27 | End: 2018-06-27

## 2018-06-27 RX ORDER — IOPAMIDOL 755 MG/ML
82 INJECTION, SOLUTION INTRAVASCULAR ONCE
Status: COMPLETED | OUTPATIENT
Start: 2018-06-27 | End: 2018-06-27

## 2018-06-27 RX ORDER — CLOPIDOGREL BISULFATE 75 MG/1
300 TABLET ORAL ONCE
Status: COMPLETED | OUTPATIENT
Start: 2018-06-27 | End: 2018-06-27

## 2018-06-27 RX ORDER — HYDROXYZINE HYDROCHLORIDE 25 MG/1
25 TABLET, FILM COATED ORAL ONCE
Status: COMPLETED | OUTPATIENT
Start: 2018-06-27 | End: 2018-06-27

## 2018-06-27 RX ADMIN — METHYLPREDNISOLONE SODIUM SUCCINATE 125 MG: 125 INJECTION, POWDER, FOR SOLUTION INTRAMUSCULAR; INTRAVENOUS at 00:25

## 2018-06-27 RX ADMIN — HYDROXYZINE HYDROCHLORIDE 25 MG: 25 TABLET ORAL at 02:58

## 2018-06-27 RX ADMIN — RANITIDINE 75 MG: 75 TABLET, FILM COATED ORAL at 00:26

## 2018-06-27 RX ADMIN — CEFTRIAXONE SODIUM 2 G: 2 INJECTION, SOLUTION INTRAVENOUS at 02:02

## 2018-06-27 RX ADMIN — ACETAMINOPHEN 650 MG: 325 TABLET, FILM COATED ORAL at 02:48

## 2018-06-27 RX ADMIN — LORATADINE 10 MG: 10 TABLET ORAL at 00:26

## 2018-06-27 RX ADMIN — ASPIRIN 81 MG 324 MG: 81 TABLET ORAL at 01:35

## 2018-06-27 RX ADMIN — ALBUTEROL SULFATE 2.5 MG: 2.5 SOLUTION RESPIRATORY (INHALATION) at 03:35

## 2018-06-27 RX ADMIN — IPRATROPIUM BROMIDE AND ALBUTEROL SULFATE 3 ML: .5; 3 SOLUTION RESPIRATORY (INHALATION) at 00:09

## 2018-06-27 RX ADMIN — CLOPIDOGREL 300 MG: 75 TABLET, FILM COATED ORAL at 02:51

## 2018-06-27 RX ADMIN — AZITHROMYCIN MONOHYDRATE 500 MG: 500 INJECTION, POWDER, LYOPHILIZED, FOR SOLUTION INTRAVENOUS at 02:41

## 2018-06-27 RX ADMIN — OXYCODONE HYDROCHLORIDE 10 MG: 5 TABLET ORAL at 02:57

## 2018-06-27 RX ADMIN — IOPAMIDOL 82 ML: 755 INJECTION, SOLUTION INTRAVENOUS at 00:56

## 2018-06-27 RX ADMIN — SODIUM CHLORIDE, POTASSIUM CHLORIDE, SODIUM LACTATE AND CALCIUM CHLORIDE 1000 ML: 600; 310; 30; 20 INJECTION, SOLUTION INTRAVENOUS at 00:22

## 2018-06-27 RX ADMIN — SODIUM CHLORIDE 100 ML: 9 INJECTION, SOLUTION INTRAVENOUS at 00:56

## 2018-06-27 RX ADMIN — IPRATROPIUM BROMIDE AND ALBUTEROL SULFATE 3 ML: .5; 3 SOLUTION RESPIRATORY (INHALATION) at 00:11

## 2018-06-27 NOTE — ED PROVIDER NOTES
History     Chief Complaint   Patient presents with     Shortness of Breath     Chest Pain     HPI  Maris Wagner is a 43 year old female with past medical history which includes GERD, IBS, obesity, chronic low back pain, matured arthritis, and report of left ankle ORIF performed yesterday at outside facility by [facility] podiatrist Dr. Shalonda hanna for increasing shortness of breath with chest discomfort and cough.  She explains that she felt relatively well yesterday but throughout today suffered from increasing shortness of breath and productive cough.  She also has nasal congestion which she attributes to seasonal allergies.  She describes mild chest tightness diffusely which is similar to history of pneumonia.  She has felt chilled but unaware of fever.  Patient denies headache, sore throat, back pain, abdominal pain, nausea/vomiting, or other associated symptoms.  Left lower extremity is casted but she denies calf pain or extremity swelling.  No known exacerbating or alleviating factors.    Problem List:    Patient Active Problem List    Diagnosis Date Noted     Rheumatoid arthritis involving multiple sites with positive rheumatoid factor (H) 09/25/2017     Priority: Medium     Chronic back pain 04/12/2017     Priority: Medium     Recurrent herniation of lumbar disc 12/28/2016     Priority: Medium     S/P lumbar discectomy 07/18/2016     Priority: Medium     Anemia 02/02/2015     Priority: Medium     Irritable bowel syndrome 02/02/2015     Priority: Medium     Obesity 02/02/2015     Priority: Medium     Gastroesophageal reflux disease      Priority: Medium     Diagnosis updated by automated process. Provider to review and confirm.       Ileus, postoperative (H) 01/31/2015     Priority: Medium     Spinal stenosis of lumbar region 01/29/2015     Priority: Medium     Herniation of lumbar intervertebral disc without myelopathy 01/29/2015     Priority: Medium     CARDIOVASCULAR SCREENING; LDL GOAL LESS THAN  "130 12/10/2012     Priority: Medium     Seasonal allergies 02/02/2015     Priority: Low        Past Medical History:    No past medical history on file.    Past Surgical History:    Past Surgical History:   Procedure Laterality Date     ARTHROSCOPY ANKLE, OPEN REPAIR LIGAMENT, COMBINED Right 8/3/2017    Procedure: COMBINED ARTHROSCOPY ANKLE, OPEN REPAIR LIGAMENT;  Right Ankle Arthroscopic Evaluation and Debridement,Lateral Ligament Repair,Fracture Evaluation, Open Reduction Internal Fixation ;  Surgeon: Clint Simon DPM;  Location: [facility] OR     OPEN REDUCTION INTERNAL FIXATION ANKLE Right 8/3/2017    Procedure: OPEN REDUCTION INTERNAL FIXATION ANKLE;;  Surgeon: Clint Simon DPM;  Location: [facility] OR     REMOVE FOREIGN BODY FINGER Left 3/28/2017    Procedure: REMOVE FOREIGN BODY FINGER;  Surgeon: Tabby Chan MD;  Location: [facility] OR     TUBAL LIGATION         Family History:    Family History   Problem Relation Age of Onset     Diabetes Mother      pre diabetes     Arthritis Mother      Hypertension Father      Cancer Maternal Grandmother      Cancer Maternal Grandfather      Cancer Paternal Grandmother      Cancer Paternal Grandfather      mesothelioma       Social History:  Marital Status:  Single [1]  Social History   Substance Use Topics     Smoking status: Current Every Day Smoker     Packs/day: 0.50     Years: 25.00     Types: Cigarettes     Smokeless tobacco: Never Used     Alcohol use Yes      Comment: rare        Medications:      albuterol (PROAIR HFA/PROVENTIL HFA/VENTOLIN HFA) 108 (90 BASE) MCG/ACT Inhaler   famotidine (PEPCID) 40 MG tablet   HYDROcodone-acetaminophen (NORCO) 5-325 MG per tablet   Insulin Syringe-Needle U-100 (BD INSULIN SYRINGE) 27G X 1/2\" 1 ML MISC   methotrexate sodium 50 MG/2ML SOLN   order for DME   oxyCODONE-acetaminophen (PERCOCET) 5-325 MG per tablet   pregabalin (LYRICA) 75 MG capsule   vitamin D (ERGOCALCIFEROL) 17736 UNIT capsule         Review of " Systems  Constitutional: Positive for chills.  HENT:  Negative oral or throat pain.   Cardiovascular: Positive for generalized chest tightness.  Negative for radiating or pleuritic pain.  Respiratory: Positive for cough with shortness of breath.  Negative for hemoptysis.  Gastrointestinal:  Negative for abdominal pain, nausea or vomiting.  Musculoskeletal:  Negative for back pain or recent injuries.    All others reviewed and are negative.      Physical Exam   BP: 114/74  Heart Rate: 104  Temp: 99.3  F (37.4  C)  Resp: 24  Weight: 117.9 kg (260 lb)  SpO2: (!) 88 %      Physical Exam  Constitutional:  Well developed, well nourished.  Appears nontoxic and in no acute distress.   HENT:  Normocephalic and atraumatic.  Symmetric in appearance.  Eyes:  Conjunctivae are normal.  Neck:  Neck supple.  Cardiovascular:  No cyanosis.  Mild tachycardia with regular rhythm.  No audible murmurs noted.   Respiratory:  Effort normal without sign of respiratory distress.  Mild bibasilar wheezing without rhonchi or crackles.  Gastrointestinal:  Soft, nondistended abdomen.  Nontender and without guarding.  No rigidity or rebound tenderness.  Negative Florentino's sign.  Negative McBurney's point.    Genitourinary:  Noncontributory.   Musculoskeletal:  Moves extremities spontaneously.  Neurological:  Patient is alert.  Skin:  Skin is warm and dry.  Psychiatric:  Normal mood and affect.      ED Course     ED Course     Procedures                 EKG Interpretation:      Interpreted by: Toney Lam  Time reviewed: Upon arrival     Symptoms at time of EKG: SOB  Rhythm: Sinus  Rate: Normal with rate 99 bpm  Axis: Normal    Conduction: None atypical   ST Segments/ T Waves: No pathologic ST-elevations, subtle T-wave inversions of V1 and V2  Q Waves: None  Comparison to prior: None readily available    Clinical Impression: No sign of ischemia         Critical Care time:  35 minutes  Critical Care Addendum    My initial assessment, based on my  review of nursing observations, review of vital signs, focused history, physical exam and 12 lead ECG analysis, established that Maris Wagner has respiratory insufficiency, which requires immediate intervention, and therefore She is critically ill.     After the initial assessment, the care team initiated multiple lab tests, initiated IV fluid administration and initiated medication therapy with oxygen and antibiotics to provide stabilization care. Due to the critical nature of this patient, I reassessed nursing observations, vital signs, physical exam, review of cardiac rhythm monitor, 12 lead ECG analysis, mental status and respiratory status multiple times prior to She disposition.     Time also spent performing documentation, discussion with family to obtain medical information for decision making, reviewing test results, discussion with consultants and coordination of care.     Critical care time (excluding teaching time and procedures): 35 minutes.               Results for orders placed or performed during the hospital encounter of 06/26/18 (from the past 24 hour(s))   CBC with platelets differential   Result Value Ref Range    WBC 5.4 4.0 - 11.0 10e9/L    RBC Count 4.03 3.8 - 5.2 10e12/L    Hemoglobin 10.9 (L) 11.7 - 15.7 g/dL    Hematocrit 35.9 35.0 - 47.0 %    MCV 89 78 - 100 fl    MCH 27.0 26.5 - 33.0 pg    MCHC 30.4 (L) 31.5 - 36.5 g/dL    RDW 15.6 (H) 10.0 - 15.0 %    Platelet Count 187 150 - 450 10e9/L    Diff Method Automated Method     % Neutrophils 69.5 %    % Lymphocytes 20.8 %    % Monocytes 8.2 %    % Eosinophils 1.1 %    % Basophils 0.2 %    % Immature Granulocytes 0.2 %    Nucleated RBCs 0 0 /100    Absolute Neutrophil 3.7 1.6 - 8.3 10e9/L    Absolute Lymphocytes 1.1 0.8 - 5.3 10e9/L    Absolute Monocytes 0.4 0.0 - 1.3 10e9/L    Absolute Eosinophils 0.1 0.0 - 0.7 10e9/L    Absolute Basophils 0.0 0.0 - 0.2 10e9/L    Abs Immature Granulocytes 0.0 0 - 0.4 10e9/L    Absolute Nucleated RBC 0.0     Comprehensive metabolic panel   Result Value Ref Range    Sodium 138 133 - 144 mmol/L    Potassium 4.0 3.4 - 5.3 mmol/L    Chloride 105 94 - 109 mmol/L    Carbon Dioxide 24 20 - 32 mmol/L    Anion Gap 9 3 - 14 mmol/L    Glucose 129 (H) 70 - 99 mg/dL    Urea Nitrogen 13 7 - 30 mg/dL    Creatinine 0.88 0.52 - 1.04 mg/dL    GFR Estimate 70 >60 mL/min/1.7m2    GFR Estimate If Black 85 >60 mL/min/1.7m2    Calcium 7.6 (L) 8.5 - 10.1 mg/dL    Bilirubin Total 0.5 0.2 - 1.3 mg/dL    Albumin 2.8 (L) 3.4 - 5.0 g/dL    Protein Total 6.9 6.8 - 8.8 g/dL    Alkaline Phosphatase 74 40 - 150 U/L    ALT 14 0 - 50 U/L    AST 13 0 - 45 U/L   Troponin I   Result Value Ref Range    Troponin I ES 0.287 (HH) 0.000 - 0.045 ug/L   NT pro BNP   Result Value Ref Range    N-Terminal Pro BNP Inpatient 1488 (H) 0 - 450 pg/mL   Blood gas venous   Result Value Ref Range    Ph Venous 7.35 7.32 - 7.43 pH    PCO2 Venous 45 40 - 50 mm Hg    PO2 Venous 28 25 - 47 mm Hg    Bicarbonate Venous 25 21 - 28 mmol/L    Base Deficit Venous 0.9 mmol/L   CT Chest Pulmonary Embolism w Contrast    Narrative    CT CHEST PULMONARY EMBOLISM W CONTRAST  6/27/2018 1:12 AM     HISTORY: Chest pain with shortness of breath, recent lower extremity  surgery.    TECHNIQUE: Volumetric acquisition of the chest after the  administration of 82 mL Isovue-370 IV contrast. Radiation dose for  this scan was reduced using automated exposure control, adjustment of  the mA and/or kV according to patient size, or iterative  reconstruction technique.     COMPARISON: None.    FINDINGS: No visualized pulmonary embolism. Exam is slightly limited  by suboptimal opacification of some of the peripheral pulmonary  arteries. No large central emboli. Moderate patchy bilateral  infiltrates, most prominent in the mid and upper lungs. Mild  mediastinal adenopathy. Normal heart size.      Impression    IMPRESSION:  1. No visualized pulmonary embolism. Pulmonary artery opacification,  slightly  limited.  2. Patchy bilateral infiltrates probably due to pneumonia. Recommend  short-term follow-up to ensure resolution.  3. Mild mediastinal adenopathy.    POPEYE DE MD   Troponin I (second draw)   Result Value Ref Range    Troponin I ES 0.323 (HH) 0.000 - 0.045 ug/L       Medications   cefTRIAXone IN D5W (ROCEPHIN) intermittent infusion 2 g (0 g Intravenous Stopped 6/27/18 0238)   azithromycin (ZITHROMAX) 500 mg in sodium chloride 0.9 % 250 mL intermittent infusion (500 mg Intravenous New Bag 6/27/18 0241)   azithromycin (ZITHROMAX) 250 mg in sodium chloride 0.9 % 250 mL intermittent infusion (not administered)   ipratropium - albuterol 0.5 mg/2.5 mg/3 mL (DUONEB) neb solution 3 mL (3 mLs Nebulization Given 6/27/18 0011)   ipratropium - albuterol 0.5 mg/2.5 mg/3 mL (DUONEB) neb solution 3 mL (3 mLs Nebulization Given 6/27/18 0009)   methylPREDNISolone sodium succinate (solu-MEDROL) injection 125 mg (125 mg Intravenous Given 6/27/18 0025)   lactated ringers BOLUS 1,000 mL (0 mLs Intravenous Stopped 6/27/18 0156)   loratadine (CLARITIN) tablet 10 mg (10 mg Oral Given 6/27/18 0026)   ranitidine (ZANTAC) tablet 75 mg (75 mg Oral Given 6/27/18 0026)   iopamidol (ISOVUE-370) solution 82 mL (82 mLs Intravenous Given 6/27/18 0056)   sodium chloride 0.9 % bag 500mL for CT scan flush use (100 mLs Intravenous Given 6/27/18 0056)   aspirin chewable tablet 324 mg (324 mg Oral Given 6/27/18 0135)   acetaminophen (TYLENOL) tablet 650 mg (650 mg Oral Given 6/27/18 0248)   clopidogrel (PLAVIX) tablet 300 mg (300 mg Oral Given 6/27/18 0251)   oxyCODONE IR (ROXICODONE) tablet 10 mg (10 mg Oral Given 6/27/18 0257)   hydrOXYzine (ATARAX) tablet 25 mg (25 mg Oral Given 6/27/18 0258)       Assessments & Plan (with Medical Decision Making)   Maris Wagner is a 43 year old female who presented to the department for evaluation of 24 hours of progressive shortness of breath with cough.  Patient also had some achy anterior chest  tightness but resolved prior to arrival to the department.  She was noted to be hypoxic with oxygen saturation 87-88% on room air and with productive cough.  Given her report of chest discomfort with hypoxia and recent orthopedic surgical procedure, CT pulmonary angiogram was ordered.  The imaging study was independently reviewed, radiologist notes bilateral infiltrates without sign of pulmonary embolism.  Patient has had no recent inpatient hospital stays, simple same-day surgery recently performed, so IV antibiotic ceftriaxone and azithromycin were given for treatment of community-acquired pneumonia.  EKG morphology without obvious signs of ischemia, but patient's troponin is initially elevated at 0.28.  She had taken her daily 325 mg aspirin but was given 324 mg dose in the department as well.  Subsequent EKG morphology unchanged.  Repeat troponin level at 90 minutes reveals an increase to 0.32 which is a bit more than expected due to demand ischemia.  [facility] cardiologist Dr. Rosario was consulted, agrees with concern and recommends initial loading dose of 300 mg Plavix but withhold heparin as she has no active chest discomfort.  Patient was subsequently accepted by [facility] hospitalist Dr. Mcpherson without further recommendation.    The patient and her mother have been informed of her results and the recommendation for transfer, patient requested [facility] as she has stayed there in the past.  They have verbalized an understanding, all questions answered, and they are in agreement with the plan at this time.      Disclaimer:  This note consists of symbols derived from keyboarding, dictation, and/or voice recognition software.  As a result, there may be errors in the script that have gone undetected.  Please consider this when interpreting information found in the chart.          I have reviewed the nursing notes.    I have reviewed the findings, diagnosis, plan and need for follow up with the patient.        Current Discharge Medication List          Final diagnoses:   Shortness of breath   Hypoxia   Pneumonia of both lungs due to infectious organism, unspecified part of lung   Elevated troponin       6/26/2018   [facility] EMERGENCY DEPARTMENT     Toney Lam DO  06/27/18 0258

## 2018-06-27 NOTE — ED NOTES
DATE:  6/27/2018   TIME OF RECEIPT FROM LAB:  0226  LAB TEST:  troponin  LAB VALUE:  0.323  RESULTS GIVEN WITH READ-BACK TO (PROVIDER):  Dr. Lam  TIME LAB VALUE REPORTED TO PROVIDER:   0229

## 2018-06-27 NOTE — ED TRIAGE NOTES
Patient developed SOB and chest pain tonight after stating it had been getting harder to breath over last 2 days, patient had foot surgery last Monday and is prone to developing pneumonia.  Patient mother in room at bedside.

## 2018-06-27 NOTE — ED NOTES
DATE:  6/27/2018   TIME OF RECEIPT FROM LAB:  0128  LAB TEST:  troponin  LAB VALUE:  0.287  RESULTS GIVEN WITH READ-BACK TO (PROVIDER):  Dr. Lam  TIME LAB VALUE REPORTED TO PROVIDER:   0129

## 2018-07-03 LAB
BACTERIA SPEC CULT: NO GROWTH
BACTERIA SPEC CULT: NO GROWTH
Lab: NORMAL
Lab: NORMAL
SPECIMEN SOURCE: NORMAL
SPECIMEN SOURCE: NORMAL

## 2018-07-28 ENCOUNTER — HOSPITAL ENCOUNTER (EMERGENCY)
Facility: CLINIC | Age: 43
Discharge: HOME | End: 2018-07-28
Attending: FAMILY MEDICINE | Admitting: FAMILY MEDICINE
Payer: MEDICARE

## 2018-07-28 VITALS
TEMPERATURE: 98.3 F | RESPIRATION RATE: 20 BRPM | OXYGEN SATURATION: 99 % | SYSTOLIC BLOOD PRESSURE: 126 MMHG | HEART RATE: 86 BPM | DIASTOLIC BLOOD PRESSURE: 84 MMHG

## 2018-07-28 DIAGNOSIS — B37.2 YEAST DERMATITIS: ICD-10-CM

## 2018-07-28 PROCEDURE — 99283 EMERGENCY DEPT VISIT LOW MDM: CPT | Performed by: FAMILY MEDICINE

## 2018-07-28 PROCEDURE — 99284 EMERGENCY DEPT VISIT MOD MDM: CPT | Mod: Z6 | Performed by: FAMILY MEDICINE

## 2018-07-28 RX ORDER — FLUCONAZOLE 150 MG/1
150 TABLET ORAL
Qty: 4 TABLET | Refills: 0 | Status: SHIPPED | OUTPATIENT
Start: 2018-07-28 | End: 2018-12-04

## 2018-07-28 NOTE — ED AVS SNAPSHOT
[facility] Emergency Department    [address]    [place] [address]    Phone:  [phone]    Fax:  [phone]                                       Maris Wagner   MRN: [id number]    Department:  [facility] Emergency Department   Date of Visit:  7/28/2018           Patient Information     Date Of Birth          1975        Your diagnoses for this visit were:     Yeast dermatitis        You were seen by Juan San MD.        Discharge Instructions       Return to the Emergency Room if the following occurs:     Fever >101, or for any concern at anytime.    Or, follow-up with the following provider as we discussed:     Return to your primary doctor as scheduled.    Medications discussed:    Fluconazole.  Consider hydrocortisone cream applied to the areas that itch, twice a day over the next 7 days.    If you received pain-relieving or sedating medication during your time in the ER, avoid alcohol, driving automobiles, or working with machinery.  Also, a responsible adult must stay with you.        Call the Nurse Advice Line at [phone] or [phone] for any concern at anytime.      Your next 10 appointments already scheduled     Aug 23, 2018  1:20 PM CDT   Return Visit with Fransisco Montana MD   [facility] ([place] [id number] [facility] [address] [phone]              24 Hour Appointment Hotline       To make an appointment at any [facility], call [phone]). If you don't have a family doctor or clinic, we will help you find one. [facility] are conveniently located to serve the needs of you and your family.             Review of your medicines      START taking        Dose / Directions Last dose taken    fluconazole 150 MG tablet   Commonly known as:  DIFLUCAN   Dose:  150 mg   Quantity:  4 tablet        Take 1 tablet (150 mg) by mouth every 3 days   Refills:  0          Our records show that  "you are taking the medicines listed below. If these are incorrect, please call your family doctor or clinic.        Dose / Directions Last dose taken    albuterol 108 (90 Base) MCG/ACT Inhaler   Commonly known as:  PROAIR HFA/PROVENTIL HFA/VENTOLIN HFA   Dose:  2 puff        Inhale 2 puffs into the lungs every 6 hours   Refills:  0        famotidine 40 MG tablet   Commonly known as:  PEPCID   Dose:  40 mg   Quantity:  90 tablet        Take 1 tablet (40 mg) by mouth At Bedtime   Refills:  0        HYDROcodone-acetaminophen 5-325 MG per tablet   Commonly known as:  NORCO   Dose:  1 tablet   Quantity:  12 tablet        Take 1 tablet by mouth every 6 hours as needed for pain   Refills:  0        Insulin Syringe-Needle U-100 27G X 1/2\" 1 ML Misc   Commonly known as:  BD insulin syringe   Quantity:  10 each        For once weekly methotrexate administration   Refills:  3        LYRICA 75 MG capsule   Dose:  75 mg   Generic drug:  pregabalin        Take 75 mg by mouth 2 times daily   Refills:  0        methotrexate sodium 50 MG/2ML Soln   Dose:  20 mg   Quantity:  1 vial        Inject 0.8 mLs (20 mg) as directed every 7 days Office visit for further refills.   Refills:  0        order for DME   Quantity:  1 Units        Thumb spica   Refills:  0        oxyCODONE-acetaminophen 5-325 MG per tablet   Commonly known as:  PERCOCET   Dose:  1 tablet        Take 1 tablet by mouth every 4 hours as needed for moderate to severe pain   Refills:  0        vitamin D 32546 UNIT capsule   Commonly known as:  ERGOCALCIFEROL   Dose:  04010 Units   Quantity:  12 capsule        Take 1 capsule (50,000 Units) by mouth every 7 days   Refills:  1                Prescriptions were sent or printed at these locations (1 Prescription)                   Other Prescriptions                Printed at Department/Unit printer (1 of 1)         fluconazole (DIFLUCAN) 150 MG tablet                Orders Needing Specimen Collection     None      Pending " Results     No orders found from 7/26/2018 to 7/29/2018.            Pending Culture Results     No orders found from 7/26/2018 to 7/29/2018.            Pending Results Instructions     If you had any lab results that were not finalized at the time of your Discharge, you can call the ED Lab Result RN at [phone]. You will be contacted by this team for any positive Lab results or changes in treatment. The nurses are available 7 days a week from 10A to 6:30P.  You can leave a message 24 hours per day and they will return your call.        Test Results From Your Hospital Stay               Thank you for choosing [facility]       Thank you for choosing [facility] for your care. Our goal is always to provide you with excellent care. Hearing back from our patients is one way we can continue to improve our services. Please take a few minutes to complete the written survey that you may receive in the mail after you visit with us. Thank you!        [employer] Information     [employer] gives you secure access to your electronic health record. If you see a primary care provider, you can also send messages to your care team and make appointments. If you have questions, please call your primary care clinic.  If you do not have a primary care provider, please call [phone] and they will assist you.        Care EveryWhere ID     This is your Care EveryWhere ID. This could be used by other organizations to access your [facility] medical records  [id number]        Equal Access to Services     JEFFERY CHIANG : Omayra moralez Sodiane, waaxda luqadaha, qaybta kaalpaula self, reginald paula. So [facility] [phone].    ATENCIÓN: Si habla español, tiene a cabrales disposición servicios gratuitos de asistencia lingüística. Llame al [phone].    We comply with applicable federal civil rights laws and [place] laws. We do not discriminate on the basis of race, color, national origin, age, disability, sex,  sexual orientation, or gender identity.            After Visit Summary       This is your record. Keep this with you and show to your community pharmacist(s) and doctor(s) at your next visit.

## 2018-07-28 NOTE — ED AVS SNAPSHOT
[facility] Emergency Department    [address] [address]    Phone:  [phone]    Fax:  [phone]                                       Maris Wagner   MRN: [id number]    Department:  [facility] Emergency Department   Date of Visit:  7/28/2018           After Visit Summary Signature Page     I have received my discharge instructions, and my questions have been answered. I have discussed any challenges I see with this plan with the nurse or doctor.    ..........................................................................................................................................  Patient/Patient Representative Signature      ..........................................................................................................................................  Patient Representative Print Name and Relationship to Patient    ..................................................               ................................................  Date                                            Time    ..........................................................................................................................................  Reviewed by Signature/Title    ...................................................              ..............................................  Date                                                            Time

## 2018-07-29 NOTE — DISCHARGE INSTRUCTIONS
Return to the Emergency Room if the following occurs:     Fever >101, or for any concern at anytime.    Or, follow-up with the following provider as we discussed:     Return to your primary doctor as scheduled.    Medications discussed:    Fluconazole.  Consider hydrocortisone cream applied to the areas that itch, twice a day over the next 7 days.    If you received pain-relieving or sedating medication during your time in the ER, avoid alcohol, driving automobiles, or working with machinery.  Also, a responsible adult must stay with you.        Call the Nurse Advice Line at [phone] or [phone] for any concern at anytime.

## 2018-07-29 NOTE — ED PROVIDER NOTES
HPI  Patient is a 43-year-old female presenting with concern for a rash on her left ankle and foot.  She recently had surgery on her ankle.  She has been wearing a walking boot on a regular basis since that time.  She has been wrapping the ankle when the walking boot is not on.  She recognized a rash present about 1 week ago.  It is become worse with time.  It is pruritic.  She denies pain.  She denies fever or illness.    ROS: All other review of systems are negative other than that noted above.      History reviewed. No pertinent past medical history.  Past Surgical History:   Procedure Laterality Date     ARTHROSCOPY ANKLE, OPEN REPAIR LIGAMENT, COMBINED Right 8/3/2017    Procedure: COMBINED ARTHROSCOPY ANKLE, OPEN REPAIR LIGAMENT;  Right Ankle Arthroscopic Evaluation and Debridement,Lateral Ligament Repair,Fracture Evaluation, Open Reduction Internal Fixation ;  Surgeon: Clint Simon DPM;  Location: [facility]     OPEN REDUCTION INTERNAL FIXATION ANKLE Right 8/3/2017    Procedure: OPEN REDUCTION INTERNAL FIXATION ANKLE;;  Surgeon: Clint Simon DPM;  Location: [facility]     REMOVE FOREIGN BODY FINGER Left 3/28/2017    Procedure: REMOVE FOREIGN BODY FINGER;  Surgeon: Tabby Chan MD;  Location: [facility]     TUBAL LIGATION       Family History   Problem Relation Age of Onset     Diabetes Mother      pre diabetes     Arthritis Mother      Hypertension Father      Cancer Maternal Grandmother      Cancer Maternal Grandfather      Cancer Paternal Grandmother      Cancer Paternal Grandfather      mesothelioma     Social History   Substance Use Topics     Smoking status: Current Every Day Smoker     Packs/day: 0.50     Years: 25.00     Types: Cigarettes     Smokeless tobacco: Never Used     Alcohol use Yes      Comment: rare         PHYSICAL  /84  Pulse 86  Temp 98.3  F (36.8  C) (Oral)  Resp 20  SpO2 99%  General: Patient is alert and in no distress.  Neurological: Alert.  Moving upper and lower  extremities equally, bilaterally.  Head / Neck: Atraumatic.  Ears: Not done.  Eyes: Pupils are equal, round, and reactive.  Normal conjunctiva.  Nose: Midline.  No epistaxis.  Mouth / Throat:  Moist. Respiratory: No respiratory distress.  Cardiovascular: Regular rhythm.  Peripheral extremities are warm.  Abdomen / Pelvis: Not done.  Genitalia: Not done.  Musculoskeletal: Not done.  Skin: There are small red, raised papules on the left foot, ankle, and distal leg.  There are also larger dry patches that are annular.  No diffuse redness or tenderness.      PHYSICIAN  I suspect the patient has yeast dermatitis.  I am going to provide her fluconazole and then recommend hydrocortisone twice a day over the next 7 days.  She should avoid using her wrap and walking boot as much as possible unless needed.      IMPRESSION    ICD-10-CM    1. Yeast dermatitis B37.2                 Juan San MD  07/28/18 2149

## 2018-07-31 ENCOUNTER — MEDICAL CORRESPONDENCE (OUTPATIENT)
Dept: HEALTH INFORMATION MANAGEMENT | Facility: CLINIC | Age: 43
End: 2018-07-31

## 2018-07-31 ENCOUNTER — TRANSFERRED RECORDS (OUTPATIENT)
Dept: HEALTH INFORMATION MANAGEMENT | Facility: CLINIC | Age: 43
End: 2018-07-31

## 2018-08-02 ENCOUNTER — TELEPHONE (OUTPATIENT)
Dept: SLEEP MEDICINE | Facility: CLINIC | Age: 43
End: 2018-08-02

## 2018-08-02 NOTE — TELEPHONE ENCOUNTER
I received a call from Leandra at [facility]. She is requesting a a consult from Dr. Olvera for a consult and sleep study for his patient. Records were received with the referral and doctor notes. I will contact the patient and get her set up with an appt. ASAP. Copy of records sent to scanning and one in chart prep .

## 2018-08-23 ENCOUNTER — OFFICE VISIT (OUTPATIENT)
Dept: RHEUMATOLOGY | Facility: CLINIC | Age: 43
End: 2018-08-23
Payer: MEDICARE

## 2018-08-23 VITALS
WEIGHT: 246 LBS | SYSTOLIC BLOOD PRESSURE: 113 MMHG | DIASTOLIC BLOOD PRESSURE: 75 MMHG | HEIGHT: 65 IN | TEMPERATURE: 100.5 F | OXYGEN SATURATION: 95 % | HEART RATE: 90 BPM | BODY MASS INDEX: 40.98 KG/M2

## 2018-08-23 DIAGNOSIS — M05.79 RHEUMATOID ARTHRITIS INVOLVING MULTIPLE SITES WITH POSITIVE RHEUMATOID FACTOR (H): Primary | ICD-10-CM

## 2018-08-23 DIAGNOSIS — E55.9 VITAMIN D DEFICIENCY: ICD-10-CM

## 2018-08-23 PROBLEM — E66.01 MORBID OBESITY (H): Status: ACTIVE | Noted: 2018-08-23

## 2018-08-23 LAB
ALBUMIN SERPL-MCNC: 3.5 G/DL (ref 3.4–5)
ALP SERPL-CCNC: 72 U/L (ref 40–150)
ALT SERPL W P-5'-P-CCNC: 33 U/L (ref 0–50)
AST SERPL W P-5'-P-CCNC: 15 U/L (ref 0–45)
BASOPHILS # BLD AUTO: 0 10E9/L (ref 0–0.2)
BASOPHILS NFR BLD AUTO: 0.2 %
BILIRUB DIRECT SERPL-MCNC: <0.1 MG/DL (ref 0–0.2)
BILIRUB SERPL-MCNC: 0.2 MG/DL (ref 0.2–1.3)
CREAT SERPL-MCNC: 0.78 MG/DL (ref 0.52–1.04)
CRP SERPL-MCNC: 4.2 MG/L (ref 0–8)
DIFFERENTIAL METHOD BLD: ABNORMAL
EOSINOPHIL # BLD AUTO: 0.1 10E9/L (ref 0–0.7)
EOSINOPHIL NFR BLD AUTO: 2.2 %
ERYTHROCYTE [DISTWIDTH] IN BLOOD BY AUTOMATED COUNT: 17 % (ref 10–15)
ERYTHROCYTE [SEDIMENTATION RATE] IN BLOOD BY WESTERGREN METHOD: 29 MM/H (ref 0–20)
GFR SERPL CREATININE-BSD FRML MDRD: 81 ML/MIN/1.7M2
HCT VFR BLD AUTO: 37 % (ref 35–47)
HGB BLD-MCNC: 12 G/DL (ref 11.7–15.7)
LYMPHOCYTES # BLD AUTO: 2.2 10E9/L (ref 0.8–5.3)
LYMPHOCYTES NFR BLD AUTO: 49.3 %
MCH RBC QN AUTO: 26.3 PG (ref 26.5–33)
MCHC RBC AUTO-ENTMCNC: 32.4 G/DL (ref 31.5–36.5)
MCV RBC AUTO: 81 FL (ref 78–100)
MONOCYTES # BLD AUTO: 0.6 10E9/L (ref 0–1.3)
MONOCYTES NFR BLD AUTO: 12.8 %
NEUTROPHILS # BLD AUTO: 1.6 10E9/L (ref 1.6–8.3)
NEUTROPHILS NFR BLD AUTO: 35.5 %
PLATELET # BLD AUTO: 233 10E9/L (ref 150–450)
PROT SERPL-MCNC: 8 G/DL (ref 6.8–8.8)
RBC # BLD AUTO: 4.57 10E12/L (ref 3.8–5.2)
WBC # BLD AUTO: 4.5 10E9/L (ref 4–11)

## 2018-08-23 PROCEDURE — 85652 RBC SED RATE AUTOMATED: CPT | Performed by: INTERNAL MEDICINE

## 2018-08-23 PROCEDURE — 36415 COLL VENOUS BLD VENIPUNCTURE: CPT | Performed by: INTERNAL MEDICINE

## 2018-08-23 PROCEDURE — 85025 COMPLETE CBC W/AUTO DIFF WBC: CPT | Performed by: INTERNAL MEDICINE

## 2018-08-23 PROCEDURE — 86140 C-REACTIVE PROTEIN: CPT | Performed by: INTERNAL MEDICINE

## 2018-08-23 PROCEDURE — 80076 HEPATIC FUNCTION PANEL: CPT | Performed by: INTERNAL MEDICINE

## 2018-08-23 PROCEDURE — 99213 OFFICE O/P EST LOW 20 MIN: CPT | Performed by: INTERNAL MEDICINE

## 2018-08-23 PROCEDURE — 82565 ASSAY OF CREATININE: CPT | Performed by: INTERNAL MEDICINE

## 2018-08-23 RX ORDER — SYRINGE-NEEDLE,INSULIN,0.5 ML 27GX1/2"
SYRINGE, EMPTY DISPOSABLE MISCELLANEOUS
Qty: 10 EACH | Refills: 3 | Status: SHIPPED | OUTPATIENT
Start: 2018-08-23 | End: 2018-12-04

## 2018-08-23 RX ORDER — ERGOCALCIFEROL 1.25 MG/1
50000 CAPSULE, LIQUID FILLED ORAL
Qty: 12 CAPSULE | Refills: 1 | Status: SHIPPED | OUTPATIENT
Start: 2018-08-23 | End: 2018-12-04

## 2018-08-23 RX ORDER — FOLIC ACID 1 MG/1
1 TABLET ORAL DAILY
Qty: 100 TABLET | Refills: 3 | Status: SHIPPED | OUTPATIENT
Start: 2018-08-23 | End: 2018-12-04

## 2018-08-23 NOTE — PROGRESS NOTES
Rheumatology Clinic Visit      Maris Wagner MRN# [id number]   YOB: 1975 Age: 43 year old      Date of visit: 8/23/18   PCP: Dr. Arielle Madrid at [facility]    Chief Complaint   Patient presents with:  RECHECK: pain and swelling in fingers, wrists, and knees. Been off of MTX since May     Assessment and Plan     1. Rheumatoid Arthritis (, ): Dx'd 2017.  Initially presented with symmetric synovitis of the MCPs and PIPs; morning stiffness all day.  She was then started on MTX 20 mg SQ once weekly with significant improvement - described by the patient as a miracle drug.  She was then lost to f/u and toxicity monitoring labs were not up to date. She ran out of MTX 3 months ago with worsening symptoms since then.  Active synovitis on exam today. Restart MTX. Had significant benefit by 3 months last time with the plan to see back for re-eval after 6 mo of MTX therapy so will do that now.    - Restart methotrexate 20mg SQ once every 7 days  - Restart folic acid 1mg daily  - Labs today: CBC, Creatinine, Hepatic Panel, ESR, CRP  - Labs in 3 months: CBC, Creatinine, Hepatic Panel  - Labs in 6 months: CBC, Creatinine, Hepatic Panel, ESR, CRP     2. KIERAN 1:1280 nucleolar: Additional labs not suggestive of an KIERAN-associated rheumatologic disorder.  Other than inflammatory arthritis that is better explained by rheumatoid arthritis, she does not have symptoms to suggest an KIERAN-associated rheumatologic disorder.      3.  Vitamin D deficiency: Has been on ergocalciferol 50,000 units once weekly with some improvement but then was stopped when lost to f/u.  Restart ergocalciferol  - Restart ergocalciferol 50,000 units once weekly   - Lab in 3 mo: vitamin D    Ms. Wagner verbalized agreement with and understanding of the rational for the diagnosis and treatment plan.  All questions were answered to best of my ability and the patient's satisfaction. Ms. Wagner was advised to contact the clinic  with any questions that may arise after the clinic visit.      Thank you for involving me in the care of the patient    Return to clinic: 6 months      HPI   Maris Wagner is a 43 year old female with a past medical history significant for postoperative ileus, spinal stenosis of lumbar spine status-post discectomy, GERD, irritable bowel syndrome, and rheumatoid arthritis who is seen in consultation at the request of Dr. Arsh Alvarez for evaluation of rheumatoid arthritis.    Today, Ms. Wagner reports that methotrexate was very good.  She previously referred to it is a miracle drug.  She was doing well until she ran out of it.  She has not followed up in clinic for several months and toxicity monitoring labs were not up-to-date.  Since stopping methotrexate she has felt worse with more morning stiffness, lasting for most of the day.  Joint pain in her hands, wrists, elbows, shoulders, knees, and feet.  Had left ankle fracture status-post surgical correction.  Left ankle is in a brace.  Left ankle surgery was complicated by aspiration pneumonia and MI, per patient.    Denies fevers, chills, nausea, vomiting, constipation, diarrhea. No abdominal pain. No chest pain/pressure, palpitations, or shortness of breath. No LE swelling. No neck pain. No oral or nasal sores.  No rash. Dry eyes only with allergies.  No dry mouth.  No photosensitivity or photophobia. No eye pain or redness. No history of inflammatory eye disease.  No history of DVT, pulmonary embolism, or miscarriage.   No history of serositis.  No raynaud's phenomenon.  No seizure history. No known renal disorder.      Father: RA    Tobacco: 0.5 ppd  EtOH: no more than 1 drink per month  Drugs: none    ROS   GEN: No fevers, chills, night sweats, or weight change  SKIN: No itching, rashes, sores  HEENT: No oral or nasal ulcers.  CV: No chest pain, pressure, palpitations, or dyspnea on exertion.  PULM: No SOB, wheeze, cough.  GI: No nausea, vomiting, constipation,  diarrhea. No blood in stool. No abdominal pain.  : No blood in urine.  MSK: See HPI.  NEURO: No numbness, tingling, or weakness.  EXT: No LE swelling  PSYCH: Negative    Active Problem List     Patient Active Problem List   Diagnosis     CARDIOVASCULAR SCREENING; LDL GOAL LESS THAN 130     Ileus, postoperative (H)     Spinal stenosis of lumbar region     Anemia     Gastroesophageal reflux disease     Irritable bowel syndrome     Seasonal allergies     Obesity     S/P lumbar discectomy     Chronic back pain     Recurrent herniation of lumbar disc     Herniation of lumbar intervertebral disc without myelopathy     Rheumatoid arthritis involving multiple sites with positive rheumatoid factor (H)     Past Medical History   No past medical history on file.  Past Surgical History     Past Surgical History:   Procedure Laterality Date     ARTHROSCOPY ANKLE, OPEN REPAIR LIGAMENT, COMBINED Right 8/3/2017    Procedure: COMBINED ARTHROSCOPY ANKLE, OPEN REPAIR LIGAMENT;  Right Ankle Arthroscopic Evaluation and Debridement,Lateral Ligament Repair,Fracture Evaluation, Open Reduction Internal Fixation ;  Surgeon: Clint Simon DPM;  Location: [facility]     OPEN REDUCTION INTERNAL FIXATION ANKLE Right 8/3/2017    Procedure: OPEN REDUCTION INTERNAL FIXATION ANKLE;;  Surgeon: Clint Simon DPM;  Location: [facility]     REMOVE FOREIGN BODY FINGER Left 3/28/2017    Procedure: REMOVE FOREIGN BODY FINGER;  Surgeon: Tabby Chan MD;  Location: [facility]     TUBAL LIGATION       Allergy     Allergies   Allergen Reactions     Nka [No Known Allergies]      Current Medication List     Current Outpatient Prescriptions   Medication Sig     albuterol (PROAIR HFA/PROVENTIL HFA/VENTOLIN HFA) 108 (90 BASE) MCG/ACT Inhaler Inhale 2 puffs into the lungs every 6 hours     famotidine (PEPCID) 40 MG tablet Take 1 tablet (40 mg) by mouth At Bedtime     pregabalin (LYRICA) 75 MG capsule Take 75 mg by mouth 2 times daily     vitamin D  "(ERGOCALCIFEROL) 49288 UNIT capsule Take 1 capsule (50,000 Units) by mouth every 7 days     fluconazole (DIFLUCAN) 150 MG tablet Take 1 tablet (150 mg) by mouth every 3 days (Patient not taking: Reported on 8/23/2018)     HYDROcodone-acetaminophen (NORCO) 5-325 MG per tablet Take 1 tablet by mouth every 6 hours as needed for pain (Patient not taking: Reported on 8/23/2018)     Insulin Syringe-Needle U-100 (BD INSULIN SYRINGE) 27G X 1/2\" 1 ML MISC For once weekly methotrexate administration (Patient not taking: Reported on 5/25/2018)     methotrexate sodium 50 MG/2ML SOLN Inject 0.8 mLs (20 mg) as directed every 7 days Office visit for further refills. (Patient not taking: Reported on 8/23/2018)     order for DME Thumb spica (Patient not taking: Reported on 8/23/2018)     oxyCODONE-acetaminophen (PERCOCET) 5-325 MG per tablet Take 1 tablet by mouth every 4 hours as needed for moderate to severe pain     No current facility-administered medications for this visit.          Social History   See HPI    Family History     Family History   Problem Relation Age of Onset     Diabetes Mother      pre diabetes     Arthritis Mother      Hypertension Father      Cancer Maternal Grandmother      Cancer Maternal Grandfather      Cancer Paternal Grandmother      Cancer Paternal Grandfather      mesothelioma     Father: RA    Physical Exam     Temp Readings from Last 3 Encounters:   08/23/18 100.5  F (38.1  C) (Oral)   07/28/18 98.3  F (36.8  C) (Oral)   06/26/18 99.3  F (37.4  C) (Oral)     BP Readings from Last 5 Encounters:   08/23/18 113/75   07/28/18 126/84   06/27/18 115/84   05/25/18 136/78   02/08/18 (!) 146/97     Pulse Readings from Last 1 Encounters:   08/23/18 90     Resp Readings from Last 1 Encounters:   07/28/18 20     Estimated body mass index is 40.94 kg/(m^2) as calculated from the following:    Height as of this encounter: 1.651 m (5' 5\").    Weight as of this encounter: 111.6 kg (246 lb).    GEN: NAD  HEENT: " MMM. No oral lesions. Anicteric, noninjected sclera  CV: S1, S2. RRR. No m/r/g.  PULM: CTA bilaterally. No w/c.  MSK: Left wrist with synovial swelling and tenderness palpation.  Bilateral second-third MCPs with swelling and tenderness to palpation.  PIPs tender to palpation diffusely but without swelling.  Elbows and shoulders without swelling or tenderness to palpation.  Hips nontender to palpation.  Bilateral knees with mild medial joint line tenderness but no effusion or increased warmth.  Left ankle is in a brace.  Right ankle nontender to palpation.  Right MTP squeeze positive.  Left MTPs not examined at patient's request because of ankle pain but by visual inspection did not look swollen     NEURO: UE and LE strengths 5/5 and equal bilaterally.   SKIN: No rash  EXT: No LE edema  PSYCH: Alert. Appropriate.    Labs / Imaging (select studies)   RF/CCP  Recent Labs   Lab Test  10/26/17   0919  09/18/17   1855   CCPIGG  167*   --    RHF   --   360*     CBC  Recent Labs   Lab Test  06/27/18   0020  01/25/18   0908  12/19/17   1317   WBC  5.4  6.9  7.3   RBC  4.03  4.20  4.06   HGB  10.9*  12.7  12.0   HCT  35.9  39.0  37.1   MCV  89  93  91   RDW  15.6*  19.2*  19.3*   PLT  187  164  259   MCH  27.0  30.2  29.6   MCHC  30.4*  32.6  32.3   NEUTROPHIL  69.5  52.2  50.2   LYMPH  20.8  33.4  37.7   MONOCYTE  8.2  11.1  8.4   EOSINOPHIL  1.1  3.2  3.4   BASOPHIL  0.2  0.1  0.3   ANEU  3.7  3.6  3.6   ALYM  1.1  2.3  2.7   RAINA  0.4  0.8  0.6   AEOS  0.1  0.2  0.3   ABAS  0.0  0.0  0.0     CMP  Recent Labs   Lab Test  06/27/18   0020  01/25/18   0908  12/19/17   1317   10/26/17   0919  09/18/17   1855   NA  138   --    --    --   140  139   POTASSIUM  4.0   --    --    --   4.2  3.8   CHLORIDE  105   --    --    --   109  108   CO2  24   --    --    --   25  23   ANIONGAP  9   --    --    --   6  8   GLC  129*   --    --    --   94  93   BUN  13   --    --    --   12  16   CR  0.88  0.85  0.87   < >  0.70  0.78    GFRESTIMATED  70  73  72   < >  >90  81   GFRESTBLACK  85  89  87   < >  >90  >90   DEDE  7.6*   --    --    --   8.2*  8.0*   BILITOTAL  0.5  0.3  0.5   < >  0.3   --    ALBUMIN  2.8*  3.5  3.3*   < >  2.9*   --    PROTTOTAL  6.9  7.3  7.1   < >  6.6*   --    ALKPHOS  74  84  79   < >  69   --    AST  13  12  10   < >  7   --    ALT  14  19  28   < >  15   --     < > = values in this interval not displayed.     Calcium/VitaminD  Recent Labs   Lab Test  06/27/18   0020  01/24/18   1002  10/26/17   0919  09/18/17   1855   DEDE  7.6*   --   8.2*  8.0*   VITDT   --   21  17*   --      ESR/CRP  Recent Labs   Lab Test  01/25/18   0908  10/26/17   0919  09/18/17   1855   SED  16  53*  30*   CRP  4.2  <2.9  6.4     Hepatitis B  Recent Labs   Lab Test  10/26/17   0919   HBCAB  Nonreactive   HEPBANG  Nonreactive     Hepatitis C  Recent Labs   Lab Test  10/26/17   0919   HCVAB  Nonreactive     Lyme ab screening  Recent Labs   Lab Test  10/26/17   0919   LYMEGM  0.14     Immunization History     Immunization History   Administered Date(s) Administered     Pneumococcal 23 valent 12/10/2012     TDAP Vaccine (Adacel) 12/10/2012          Chart documentation done in part with Dragon Voice recognition Software. Although reviewed after completion, some word and grammatical error may remain.    Fransisco Montana MD

## 2018-08-23 NOTE — PATIENT INSTRUCTIONS
Rheumatology    Dr. Fransisco Montana         [facility]   (Monday)  [address]  [place] [address]       [facility]   (Tuesday)  [address]  [place] [address]    [facility]   (Wed., Thurs., and Friday)  [address] [place] [address]    Phone number: [phone]  Thank you for choosing [facility].  Angelic Bucio CMA

## 2018-08-23 NOTE — MR AVS SNAPSHOT
After Visit Summary   8/23/2018    Maris Wagner    MRN: [id number]           Patient Information     Date Of Birth          1975        Visit Information        Provider Department      8/23/2018 1:20 PM Fransisco Montana MD [facility]        Today's Diagnoses     Rheumatoid arthritis involving multiple sites with positive rheumatoid factor (H)    -  1    Vitamin D deficiency          Care Instructions    Rheumatology    Dr. Fransisco Montana         [facility]   (Monday)  [address]  [place] [address]       [facility]   (Tuesday)  [address] [place] [address]    [facility]   (Wed., Thurs., and Friday)  [address] [place] [address]    Phone number: [phone]  Thank you for choosing [facility].  Angelic Bucio CMA            Follow-ups after your visit        Your next 10 appointments already scheduled     Nov 29, 2018  8:00 AM CST   LAB with NB LAB   [facility]    [address] [place] [address]   [phone]           Please do not eat 10-12 hours before your appointment if you are coming in fasting for labs on lipids, cholesterol, or glucose (sugar). This does not apply to pregnant women. Water, hot tea and black coffee (with nothing added) are okay. Do not drink other fluids, diet soda or chew gum.            Feb 04, 2019  8:00 AM CST   LAB with NB LAB   [facility]    [address] [place] [address]   [phone]           Please do not eat 10-12 hours before your appointment if you are coming in fasting for labs on lipids, cholesterol, or glucose (sugar). This does not apply to pregnant women. Water, hot tea and black coffee (with nothing added) are okay. Do not drink other fluids, diet soda or chew gum.            Feb 07, 2019 10:20 AM CST   Return Visit with Fransisco Montana MD   [facility]  "[place] ([facility] [place])    [address] [facility]  [place] [address]   [phone]              Future tests that were ordered for you today     Open Future Orders        Priority Expected Expires Ordered    CBC with platelets differential Routine 11/17/2018 12/21/2018 8/23/2018    Creatinine Routine 11/17/2018 12/21/2018 8/23/2018    Hepatic panel Routine 11/17/2018 12/21/2018 8/23/2018    Hepatic panel Routine 2/14/2019 3/6/2019 8/23/2018    CRP inflammation Routine 2/14/2019 3/6/2019 8/23/2018    Erythrocyte sedimentation rate auto Routine 2/14/2019 3/6/2019 8/23/2018    Creatinine Routine 2/14/2019 3/6/2019 8/23/2018    CBC with platelets differential Routine 2/14/2019 3/6/2019 8/23/2018    Vitamin D Deficiency Routine 11/17/2018 12/21/2018 8/23/2018            Who to contact     If you have questions or need follow up information about today's clinic visit or your schedule please contact [facility] directly at [phone].  Normal or non-critical lab and imaging results will be communicated to you by MyChart, letter or phone within 4 business days after the clinic has received the results. If you do not hear from us within 7 days, please contact the clinic through [employer] or phone. If you have a critical or abnormal lab result, we will notify you by phone as soon as possible.  Submit refill requests through [employer] or call your pharmacy and they will forward the refill request to us. Please allow 3 business days for your refill to be completed.          Additional Information About Your Visit        [employer] Information     [employer] lets you send messages to your doctor, view your test results, renew your prescriptions, schedule appointments and more. To sign up, go to [email or web address] . Click on \"Log in\" on the left side of the screen, which will take you to the Welcome page. Then click on \"Sign up Now\" on the right side of the page.     You will be asked to enter the access " "code listed below, as well as some personal information. Please follow the directions to create your username and password.     Your access code is: [email or web address]  Expires: 2018 12:57 PM     Your access code will  in 90 days. If you need help or a new code, please call your [facility] clinic or [phone].        Care EveryWhere ID     This is your Care EveryWhere ID. This could be used by other organizations to access your [facility] medical records  [id number]        Your Vitals Were     Pulse Temperature Height Pulse Oximetry BMI (Body Mass Index)       90 100.5  F (38.1  C) (Oral) 1.651 m (5' 5\") 95% 40.94 kg/m2        Blood Pressure from Last 3 Encounters:   18 113/75   18 126/84   18 115/84    Weight from Last 3 Encounters:   18 111.6 kg (246 lb)   18 117.9 kg (260 lb)   18 120.7 kg (266 lb 3.2 oz)              We Performed the Following     CBC with platelets differential     Creatinine     CRP inflammation     Erythrocyte sedimentation rate auto     Hepatic panel          Where to get your medicines      These medications were sent to [place] - - [place], [place] - [address] [address]    Hours:  [facility] Phone:  [phone]     Insulin Syringe-Needle U-100 27G X 1/2\" 1 ML Misc    methotrexate sodium 50 MG/2ML Soln    vitamin D 03319 UNIT capsule          Primary Care Provider Office Phone # Fax #    Zay ROBIN CarpioJulius [phone]       [address] [address]        Equal Access to Services     JEFFERY CHIANG : Omayra Penny, renny aldrich, cierra kaalmareginald aden. So [facility] [phone].    ATENCIÓN: Si habla español, tiene a cabrales disposición servicios gratuitos de asistencia lingüística. Llame al [phone].    We comply with applicable federal civil rights laws and " "[place] laws. We do not discriminate on the basis of race, color, national origin, age, disability, sex, sexual orientation, or gender identity.            Thank you!     Thank you for choosing [facility]  for your care. Our goal is always to provide you with excellent care. Hearing back from our patients is one way we can continue to improve our services. Please take a few minutes to complete the written survey that you may receive in the mail after your visit with us. Thank you!             Your Updated Medication List - Protect others around you: Learn how to safely use, store and throw away your medicines at www.disposemymeds.org.          This list is accurate as of 8/23/18  1:43 PM.  Always use your most recent med list.                   Brand Name Dispense Instructions for use Diagnosis    albuterol 108 (90 Base) MCG/ACT inhaler    PROAIR HFA/PROVENTIL HFA/VENTOLIN HFA     Inhale 2 puffs into the lungs every 6 hours        famotidine 40 MG tablet    PEPCID    90 tablet    Take 1 tablet (40 mg) by mouth At Bedtime    Gastroesophageal reflux disease without esophagitis       fluconazole 150 MG tablet    DIFLUCAN    4 tablet    Take 1 tablet (150 mg) by mouth every 3 days        HYDROcodone-acetaminophen 5-325 MG per tablet    NORCO    12 tablet    Take 1 tablet by mouth every 6 hours as needed for pain    Injury of hand, right, initial encounter       Insulin Syringe-Needle U-100 27G X 1/2\" 1 ML Misc    BD insulin syringe    10 each    For once weekly methotrexate administration    Rheumatoid arthritis involving multiple sites with positive rheumatoid factor (H)       LYRICA 75 MG capsule   Generic drug:  pregabalin      Take 75 mg by mouth 2 times daily        methotrexate sodium 50 MG/2ML Soln     4 vial    Inject 0.8 mLs (20 mg) as directed every 7 days Office visit for further refills.    Rheumatoid arthritis involving multiple sites with positive rheumatoid factor (H)       order for DME "     1 Units    Thumb spica    Strain of right hand, initial encounter       oxyCODONE-acetaminophen 5-325 MG per tablet    PERCOCET     Take 1 tablet by mouth every 4 hours as needed for moderate to severe pain        vitamin D 63852 UNIT capsule    ERGOCALCIFEROL    12 capsule    Take 1 capsule (50,000 Units) by mouth every 7 days    Vitamin D deficiency

## 2018-08-24 ENCOUNTER — TELEPHONE (OUTPATIENT)
Dept: RHEUMATOLOGY | Facility: CLINIC | Age: 43
End: 2018-08-24

## 2018-08-24 NOTE — TELEPHONE ENCOUNTER
PA Initiation    Medication: methotrexate  Insurance Company: [employer] - Phone [phone] Fax [phone]  Pharmacy Filling the Rx: [facility] - - [place] - [id number] [facility]  Filling Pharmacy Phone: [phone]  Filling Pharmacy Fax:    Start Date: 8/24/2018    Central Prior Authorization Team   Phone: [phone]

## 2018-08-24 NOTE — TELEPHONE ENCOUNTER
Prior authorization started on covermymeds.    Key:  [id number]  Patients last name:  Kristy  :  1975      Angelic Bucio CMA Rheumatology  2018 8:11 AM

## 2018-08-27 NOTE — TELEPHONE ENCOUNTER
Called [employer] at [phone] to check status of this prior authorization, per representative this has been approved and reference number for this call was [id number]. Approval dates are 08/23/18 until further notice. Will document approval letter once received.     Prior Authorization Approval    Authorization Effective Date: 8/23/2018  Authorization Expiration Date: 12/31/2039  Medication: methotrexate  Approved Dose/Quantity: 8 per 70 days  Reference #: [id number]   Insurance Company: [employer] - Phone [phone] Fax [phone]  Which Pharmacy is filling the prescription (Not needed for infusion/clinic administered): [facility] - - [place] - [id number] [facility]  Pharmacy Notified: Yes they already had it ready for patient and will contacted them  Patient Notified: Yes

## 2018-10-04 ENCOUNTER — APPOINTMENT (OUTPATIENT)
Dept: GENERAL RADIOLOGY | Facility: CLINIC | Age: 43
End: 2018-10-04
Attending: PHYSICIAN ASSISTANT
Payer: MEDICARE

## 2018-10-04 ENCOUNTER — HOSPITAL ENCOUNTER (EMERGENCY)
Facility: CLINIC | Age: 43
Discharge: HOME | End: 2018-10-04
Attending: PHYSICIAN ASSISTANT | Admitting: PHYSICIAN ASSISTANT
Payer: MEDICARE

## 2018-10-04 VITALS
TEMPERATURE: 98 F | SYSTOLIC BLOOD PRESSURE: 156 MMHG | OXYGEN SATURATION: 98 % | DIASTOLIC BLOOD PRESSURE: 98 MMHG | RESPIRATION RATE: 20 BRPM | HEART RATE: 98 BPM

## 2018-10-04 DIAGNOSIS — M79.671 RIGHT FOOT PAIN: Primary | ICD-10-CM

## 2018-10-04 PROCEDURE — 99213 OFFICE O/P EST LOW 20 MIN: CPT | Performed by: PHYSICIAN ASSISTANT

## 2018-10-04 PROCEDURE — G0463 HOSPITAL OUTPT CLINIC VISIT: HCPCS

## 2018-10-04 PROCEDURE — 73630 X-RAY EXAM OF FOOT: CPT | Mod: RT

## 2018-10-04 ASSESSMENT — ENCOUNTER SYMPTOMS: NEUROLOGICAL NEGATIVE: 1

## 2018-10-04 NOTE — ED AVS SNAPSHOT
[facility] Emergency Department    [address] [facility] [address]    Phone:  [phone]    Fax:  [phone]                                       Maris Wagner   MRN: [id number]    Department:  [facility] Emergency Department   Date of Visit:  10/4/2018           After Visit Summary Signature Page     I have received my discharge instructions, and my questions have been answered. I have discussed any challenges I see with this plan with the nurse or doctor.    ..........................................................................................................................................  Patient/Patient Representative Signature      ..........................................................................................................................................  Patient Representative Print Name and Relationship to Patient    ..................................................               ................................................  Date                                   Time    ..........................................................................................................................................  Reviewed by Signature/Title    ...................................................              ..............................................  Date                                               Time          22EPIC Rev 08/18

## 2018-10-04 NOTE — ED AVS SNAPSHOT
[facility] Emergency Department    [address] [facility] [address]    Phone:  [phone]    Fax:  [phone]                                       Maris Wagner   MRN: [id number]    Department:  [facility] Emergency Department   Date of Visit:  10/4/2018           Patient Information     Date Of Birth          1975        Your diagnoses for this visit were:     Right foot pain        You were seen by Arielle Mills PA-C.      Follow-up Information     Follow up with [facility] in 1 week.    Specialty:  Family Practice    Why:  As needed, For persistent symptoms    Contact information:    [address] [address] [phone]          Follow up with [facility] Emergency Department.    Specialty:  EMERGENCY MEDICINE    Why:  As needed, If symptoms worsen    Contact information:    [address] [place] [id number]  [phone]    Additional information:    The medical center is located at   [address] [place] [address]. (between [facility] and   [facility] [place], [place] miles north   of [place]).      Discharge References/Attachments     FOOT SPRAIN (ENGLISH)      Your next 10 appointments already scheduled     Nov 29, 2018  8:00 AM CST   LAB with NB LAB   [facility] ([facility])    [address] [place] [id number] [phone]           Please do not eat 10-12 hours before your appointment if you are coming in fasting for labs on lipids, cholesterol, or glucose (sugar). This does not apply to pregnant women. Water, hot tea and black coffee (with nothing added) are okay. Do not drink other fluids, diet soda or chew gum.            Feb 04, 2019  8:00 AM CST   LAB with NB LAB   [facility] ([facility])    [address] [place] [id number] [phone]           Please do not eat 10-12 hours before your appointment if you  "are coming in fasting for labs on lipids, cholesterol, or glucose (sugar). This does not apply to pregnant women. Water, hot tea and black coffee (with nothing added) are okay. Do not drink other fluids, diet soda or chew gum.            Feb 07, 2019 10:20 AM CST   Return Visit with Fransisco Montana MD   [facility] ([facility])    [address] [facility] [address] [phone]              24 Hour Appointment Hotline       To make an appointment at any [facility], call [phone] ([phone]). If you don't have a family doctor or clinic, we will help you find one. [facility] (formerly [facility]) are conveniently located to serve the needs of you and your family.             Review of your medicines      Our records show that you are taking the medicines listed below. If these are incorrect, please call your family doctor or clinic.        Dose / Directions Last dose taken    albuterol 108 (90 Base) MCG/ACT inhaler   Commonly known as:  PROAIR HFA/PROVENTIL HFA/VENTOLIN HFA   Dose:  2 puff        Inhale 2 puffs into the lungs every 6 hours   Refills:  0        famotidine 40 MG tablet   Commonly known as:  PEPCID   Dose:  40 mg   Quantity:  90 tablet        Take 1 tablet (40 mg) by mouth At Bedtime   Refills:  0        fluconazole 150 MG tablet   Commonly known as:  DIFLUCAN   Dose:  150 mg   Quantity:  4 tablet        Take 1 tablet (150 mg) by mouth every 3 days   Refills:  0        folic acid 1 MG tablet   Commonly known as:  FOLVITE   Dose:  1 mg   Quantity:  100 tablet        Take 1 tablet (1 mg) by mouth daily   Refills:  3        HYDROcodone-acetaminophen 5-325 MG per tablet   Commonly known as:  NORCO   Dose:  1 tablet   Quantity:  12 tablet        Take 1 tablet by mouth every 6 hours as needed for pain   Refills:  0        Insulin Syringe-Needle U-100 27G X 1/2\" 1 ML Misc   Commonly known as:  BD insulin syringe   Quantity:  10 each        For once weekly methotrexate " administration   Refills:  3        LYRICA 75 MG capsule   Dose:  75 mg   Generic drug:  pregabalin        Take 75 mg by mouth 2 times daily   Refills:  0        methotrexate sodium 50 MG/2ML Soln   Dose:  20 mg   Quantity:  4 vial        Inject 0.8 mLs (20 mg) as directed every 7 days Office visit for further refills.   Refills:  5        order for DME   Quantity:  1 Units        Thumb spica   Refills:  0        oxyCODONE-acetaminophen 5-325 MG per tablet   Commonly known as:  PERCOCET   Dose:  1 tablet        Take 1 tablet by mouth every 4 hours as needed for moderate to severe pain   Refills:  0        vitamin D 60346 UNIT capsule   Commonly known as:  ERGOCALCIFEROL   Dose:  46465 Units   Quantity:  12 capsule        Take 1 capsule (50,000 Units) by mouth every 7 days   Refills:  1                Procedures and tests performed during your visit     Foot  XR, G/E 3 views, right      Orders Needing Specimen Collection     None      Pending Results     Date and Time Order Name Status Description    10/4/2018 1211 Foot  XR, G/E 3 views, right In process             Pending Culture Results     No orders found from 10/2/2018 to 10/5/2018.            Pending Results Instructions     If you had any lab results that were not finalized at the time of your Discharge, you can call the ED Lab Result RN at [phone]. You will be contacted by this team for any positive Lab results or changes in treatment. The nurses are available 7 days a week from 10A to 6:30P.  You can leave a message 24 hours per day and they will return your call.        Test Results From Your Hospital Stay        10/4/2018 12:29 PM      Result not yet available     Exam Ended                Thank you for choosing [facility]       Thank you for choosing [facility] for your care. Our goal is always to provide you with excellent care. Hearing back from our patients is one way we can continue to improve our services. Please take a few minutes to complete the  written survey that you may receive in the mail after you visit with us. Thank you!        [facility] Information     [facility] gives you secure access to your electronic health record. If you see a primary care provider, you can also send messages to your care team and make appointments. If you have questions, please call your primary care clinic.  If you do not have a primary care provider, please call [phone] and they will assist you.        Care EveryWhere ID     This is your Care EveryWhere ID. This could be used by other organizations to access your [place] medical records  [id number]        Equal Access to Services     [facility] Center[facility]ATUMA : Omayra Penny, renny aldrich, cierra self, reginald lee . So [facility] [phone].    ATENCIÓN: Si habla español, tiene a cabrales disposición servicios gratuitos de asistencia lingüística. Beckame al [phone].    We comply with applicable federal civil rights laws and [place] laws. We do not discriminate on the basis of race, color, national origin, age, disability, sex, sexual orientation, or gender identity.            After Visit Summary       This is your record. Keep this with you and show to your community pharmacist(s) and doctor(s) at your next visit.

## 2018-10-04 NOTE — ED PROVIDER NOTES
History     Chief Complaint   Patient presents with     Foot Pain     hit right foot on the side of the bathtub, yesterday, redness and swelling along with pain.     HPI  Maris Wagner is a 43 year old female who presents with complaints of right foot pain since injuring it yesterday.  Patient states she was stepping into the shower when she accidentally struck her right foot.  She has had pain across the bottom of her foot since that time that is worse with weightbearing.  Patient also complains of noticing some redness and swelling to the top of this foot.  She has attempted to take naproxen at home without much improvement.  Patient has history of surgery to both ankles in the past.  Denies fevers or chills.  She does report having history of neuropathy in her feet.        Problem List:    Patient Active Problem List    Diagnosis Date Noted     Morbid obesity (H) 08/23/2018     Priority: Medium     Rheumatoid arthritis involving multiple sites with positive rheumatoid factor (H) 09/25/2017     Priority: Medium     Chronic back pain 04/12/2017     Priority: Medium     Recurrent herniation of lumbar disc 12/28/2016     Priority: Medium     S/P lumbar discectomy 07/18/2016     Priority: Medium     Anemia 02/02/2015     Priority: Medium     Irritable bowel syndrome 02/02/2015     Priority: Medium     Obesity 02/02/2015     Priority: Medium     Gastroesophageal reflux disease      Priority: Medium     Diagnosis updated by automated process. Provider to review and confirm.       Ileus, postoperative (H) 01/31/2015     Priority: Medium     Spinal stenosis of lumbar region 01/29/2015     Priority: Medium     Herniation of lumbar intervertebral disc without myelopathy 01/29/2015     Priority: Medium     CARDIOVASCULAR SCREENING; LDL GOAL LESS THAN 130 12/10/2012     Priority: Medium     Seasonal allergies 02/02/2015     Priority: Low        Past Medical History:    No past medical history on file.    Past Surgical  "History:    Past Surgical History:   Procedure Laterality Date     ARTHROSCOPY ANKLE, OPEN REPAIR LIGAMENT, COMBINED Right 8/3/2017    Procedure: COMBINED ARTHROSCOPY ANKLE, OPEN REPAIR LIGAMENT;  Right Ankle Arthroscopic Evaluation and Debridement,Lateral Ligament Repair,Fracture Evaluation, Open Reduction Internal Fixation ;  Surgeon: Clint Simon DPM;  Location: [facility] OR     OPEN REDUCTION INTERNAL FIXATION ANKLE Right 8/3/2017    Procedure: OPEN REDUCTION INTERNAL FIXATION ANKLE;;  Surgeon: Clint Simon DPM;  Location: [facility] OR     REMOVE FOREIGN BODY FINGER Left 3/28/2017    Procedure: REMOVE FOREIGN BODY FINGER;  Surgeon: Tabby Chan MD;  Location: [facility] OR     TUBAL LIGATION         Family History:    Family History   Problem Relation Age of Onset     Diabetes Mother      pre diabetes     Arthritis Mother      Hypertension Father      Cancer Maternal Grandmother      Cancer Maternal Grandfather      Cancer Paternal Grandmother      Cancer Paternal Grandfather      mesothelioma       Social History:  Marital Status:  Single [1]  Social History   Substance Use Topics     Smoking status: Current Every Day Smoker     Packs/day: 0.50     Years: 25.00     Types: Cigarettes     Smokeless tobacco: Never Used     Alcohol use Yes      Comment: rare        Medications:      albuterol (PROAIR HFA/PROVENTIL HFA/VENTOLIN HFA) 108 (90 BASE) MCG/ACT Inhaler   famotidine (PEPCID) 40 MG tablet   fluconazole (DIFLUCAN) 150 MG tablet   folic acid (FOLVITE) 1 MG tablet   HYDROcodone-acetaminophen (NORCO) 5-325 MG per tablet   Insulin Syringe-Needle U-100 (BD INSULIN SYRINGE) 27G X 1/2\" 1 ML MISC   methotrexate sodium 50 MG/2ML SOLN   order for DME   oxyCODONE-acetaminophen (PERCOCET) 5-325 MG per tablet   pregabalin (LYRICA) 75 MG capsule   vitamin D (ERGOCALCIFEROL) 40926 UNIT capsule         Review of Systems   Musculoskeletal:        Right foot pain and swelling   Skin:        Redness to top of right foot "   Neurological: Negative.    All other systems reviewed and are negative.      Physical Exam   BP: (!) 156/98  Pulse: 98  Temp: 98  F (36.7  C)  Resp: 20  SpO2: 98 %      Physical Exam   Constitutional: She appears well-developed and well-nourished. No distress.   HENT:   Head: Normocephalic and atraumatic.   Cardiovascular: Intact distal pulses.    Pulmonary/Chest: Effort normal.   Musculoskeletal:        Right foot: There is decreased range of motion, tenderness and bony tenderness. There is no swelling, normal capillary refill, no crepitus, no deformity and no laceration.        Feet:    Tenderness to plantar aspect of right foot.  There is very slight erythema to the dorsum of this foot along the bases of the second through fourth toes.  No associated warmth or streaking erythema.  There is no breaks in the skin noted.  No ecchymosis or swelling noted.   Neurological: She is alert. She has normal strength. No sensory deficit.   Skin: Skin is warm and dry.       ED Course     ED Course     Procedures    Results for orders placed or performed during the hospital encounter of 10/04/18 (from the past 24 hour(s))   Foot  XR, G/E 3 views, right    Narrative    RIGHT FOOT THREE OR MORE VIEWS  10/4/2018 12:33 PM     HISTORY: Injury to right foot, tenderness to distal 2nd-3rd  metatarsals.       Impression    IMPRESSION: No evidence of acute fracture or dislocation. Fibular  plate and screws are noted on the edge of the radiographs.       Medications - No data to display    Assessments & Plan (with Medical Decision Making)     Pt is a 43 year old female who presents with complaints of right foot pain since injuring it yesterday.  Patient states she was stepping into the shower when she accidentally struck her right foot.  She has had pain across the bottom of her foot since that time that is worse with weightbearing.  Patient also complains of noticing some redness and swelling to the top of this foot.  Pt is afebrile on  arrival.  Exam as above.  X-rays of right foot were negative for fracture or acute pathology.  Discussed results with patient.  Encouraged symptomatic treatments at home.  Return precautions were reviewed.  Hand-outs were provided.    Patient was instructed to follow-up with PCP if no improvement in 5-7 days for continued care and management or sooner if new or worsening symptoms.  She is to return to the ED for persistent and/or worsening symptoms.  Patient expressed understanding of the diagnosis and plan and was discharged home in good condition.    I have reviewed the nursing notes.    I have reviewed the findings, diagnosis, plan and need for follow up with the patient.    Discharge Medication List as of 10/4/2018  1:17 PM          Final diagnoses:   Right foot pain       10/4/2018   [facility] EMERGENCY DEPARTMENT     Arielle Mills PA-C  10/04/18 1426

## 2018-12-04 ENCOUNTER — RADIANT APPOINTMENT (OUTPATIENT)
Dept: GENERAL RADIOLOGY | Facility: CLINIC | Age: 43
End: 2018-12-04
Attending: NURSE PRACTITIONER
Payer: MEDICARE

## 2018-12-04 ENCOUNTER — OFFICE VISIT (OUTPATIENT)
Dept: FAMILY MEDICINE | Facility: CLINIC | Age: 43
End: 2018-12-04
Payer: MEDICARE

## 2018-12-04 VITALS
HEART RATE: 84 BPM | HEIGHT: 65 IN | TEMPERATURE: 98.1 F | WEIGHT: 262 LBS | DIASTOLIC BLOOD PRESSURE: 70 MMHG | SYSTOLIC BLOOD PRESSURE: 132 MMHG | RESPIRATION RATE: 18 BRPM | BODY MASS INDEX: 43.65 KG/M2

## 2018-12-04 DIAGNOSIS — S99.922A INJURY OF LEFT FOOT, INITIAL ENCOUNTER: ICD-10-CM

## 2018-12-04 DIAGNOSIS — S99.912A ANKLE INJURY, LEFT, INITIAL ENCOUNTER: ICD-10-CM

## 2018-12-04 DIAGNOSIS — S93.402A SPRAIN OF LEFT ANKLE, UNSPECIFIED LIGAMENT, INITIAL ENCOUNTER: Primary | ICD-10-CM

## 2018-12-04 PROCEDURE — 99214 OFFICE O/P EST MOD 30 MIN: CPT | Performed by: NURSE PRACTITIONER

## 2018-12-04 PROCEDURE — 73610 X-RAY EXAM OF ANKLE: CPT | Mod: LT

## 2018-12-04 PROCEDURE — 73630 X-RAY EXAM OF FOOT: CPT | Mod: LT

## 2018-12-04 RX ORDER — HYDROCODONE BITARTRATE AND ACETAMINOPHEN 5; 325 MG/1; MG/1
1 TABLET ORAL EVERY 12 HOURS PRN
Qty: 30 TABLET | Refills: 0 | Status: SHIPPED | OUTPATIENT
Start: 2018-12-04 | End: 2018-12-26

## 2018-12-04 ASSESSMENT — ANXIETY QUESTIONNAIRES
7. FEELING AFRAID AS IF SOMETHING AWFUL MIGHT HAPPEN: SEVERAL DAYS
1. FEELING NERVOUS, ANXIOUS, OR ON EDGE: SEVERAL DAYS
GAD7 TOTAL SCORE: 7
5. BEING SO RESTLESS THAT IT IS HARD TO SIT STILL: SEVERAL DAYS
GAD7 TOTAL SCORE: 7
4. TROUBLE RELAXING: SEVERAL DAYS
2. NOT BEING ABLE TO STOP OR CONTROL WORRYING: SEVERAL DAYS
3. WORRYING TOO MUCH ABOUT DIFFERENT THINGS: SEVERAL DAYS
7. FEELING AFRAID AS IF SOMETHING AWFUL MIGHT HAPPEN: SEVERAL DAYS
GAD7 TOTAL SCORE: 7
6. BECOMING EASILY ANNOYED OR IRRITABLE: SEVERAL DAYS

## 2018-12-04 ASSESSMENT — PATIENT HEALTH QUESTIONNAIRE - PHQ9
SUM OF ALL RESPONSES TO PHQ QUESTIONS 1-9: 5
SUM OF ALL RESPONSES TO PHQ QUESTIONS 1-9: 5

## 2018-12-04 NOTE — PATIENT INSTRUCTIONS
Continue to wear cam boot.  We will also be doing a scooter.  Follow-up with orthopedics as scheduled.      Understanding Ankle Sprain    The ankle is the joint where the leg and foot meet. Bones are held in place by connective tissue called ligaments. When ankle ligaments are stretched to the point of pain and injury, it is called an ankle sprain. A sprain can tear the ligaments. These tears can be very small but still cause pain. Ankle sprains can be mild or severe.  What causes an ankle sprain?  A sprain may occur when you twist your ankle or bend it too far. This can happen when you stumble or fall. Things that can make an ankle sprain more likely include:    Having had an ankle sprain before    Playing sports that involve running and jumping. Or playing contact sports such as football or hockey.    Wearing shoes that don t support your feet and ankles well    Having ankles with poor strength and flexibility  Symptoms of an ankle sprain  Symptoms may include:    Pain or soreness in the ankle    Swelling    Redness or bruising    Not being able to walk or put weight on the affected foot    Reduced range of motion in the ankle    A popping or tearing feeling at the time the sprain occurs    An abnormal or dislocated look to the ankle    Instability or too much range of motion in the ankle  Treatment for an ankle sprain  Treatment focuses on reducing pain and swelling, and avoiding further injury. Treatments may include:    Resting the ankle. Avoid putting weight on it. This may mean using crutches until the sprain heals.    Prescription or over-the-counter pain medicines. These help reduce swelling and pain.    Cold packs. These help reduce pain and swelling.    Raising your ankle above your heart. This helps reduce swelling.    Wrapping the ankle with an elastic bandage or ankle brace. This helps reduce swelling and gives some support to the ankle. In rare cases, you may need a cast or boot.    Stretching and  other exercises. These improve flexibility and strength.    Heat packs. These may be recommended before doing ankle exercises.  Possible complications of an ankle sprain  An ankle that has been weakened by a sprain can be more likely to have repeated sprains afterward. Doing exercises to strengthen your ankle and improve balance can reduce your risk for repeated sprains. Other possible complications are long-term (chronic) pain or an ankle that remains unstable.  When to call your healthcare provider  Call your healthcare provider right away if you have any of these:    Fever of 100.4 F (38 C) or higher, or as directed    Pain, numbness, discoloration, or coldness in the foot or toes    Pain that gets worse    Symptoms that don t get better, or get worse    New symptoms   Date Last Reviewed: 3/10/2016    9306-6667 [employer]. [address] [place] [address], [place] [address]. All rights reserved. This information is not intended as a substitute for professional medical care. Always follow your healthcare professional's instructions.

## 2018-12-04 NOTE — MR AVS SNAPSHOT
After Visit Summary   12/4/2018    Maris Wagner    MRN: [id number]           Patient Information     Date Of Birth          1975        Visit Information        Provider Department      12/4/2018 2:20 PM Judy Howard APRN CNP [facility]        Today's Diagnoses     Ankle injury, left, initial encounter    -  1    Injury of left foot, initial encounter        Sprain of left ankle, unspecified ligament, initial encounter          Care Instructions    Continue to wear cam boot.  We will also be doing a scooter.  Follow-up with orthopedics as scheduled.      Understanding Ankle Sprain    The ankle is the joint where the leg and foot meet. Bones are held in place by connective tissue called ligaments. When ankle ligaments are stretched to the point of pain and injury, it is called an ankle sprain. A sprain can tear the ligaments. These tears can be very small but still cause pain. Ankle sprains can be mild or severe.  What causes an ankle sprain?  A sprain may occur when you twist your ankle or bend it too far. This can happen when you stumble or fall. Things that can make an ankle sprain more likely include:    Having had an ankle sprain before    Playing sports that involve running and jumping. Or playing contact sports such as football or hockey.    Wearing shoes that don t support your feet and ankles well    Having ankles with poor strength and flexibility  Symptoms of an ankle sprain  Symptoms may include:    Pain or soreness in the ankle    Swelling    Redness or bruising    Not being able to walk or put weight on the affected foot    Reduced range of motion in the ankle    A popping or tearing feeling at the time the sprain occurs    An abnormal or dislocated look to the ankle    Instability or too much range of motion in the ankle  Treatment for an ankle sprain  Treatment focuses on reducing pain and swelling, and avoiding further injury. Treatments may  include:    Resting the ankle. Avoid putting weight on it. This may mean using crutches until the sprain heals.    Prescription or over-the-counter pain medicines. These help reduce swelling and pain.    Cold packs. These help reduce pain and swelling.    Raising your ankle above your heart. This helps reduce swelling.    Wrapping the ankle with an elastic bandage or ankle brace. This helps reduce swelling and gives some support to the ankle. In rare cases, you may need a cast or boot.    Stretching and other exercises. These improve flexibility and strength.    Heat packs. These may be recommended before doing ankle exercises.  Possible complications of an ankle sprain  An ankle that has been weakened by a sprain can be more likely to have repeated sprains afterward. Doing exercises to strengthen your ankle and improve balance can reduce your risk for repeated sprains. Other possible complications are long-term (chronic) pain or an ankle that remains unstable.  When to call your healthcare provider  Call your healthcare provider right away if you have any of these:    Fever of 100.4 F (38 C) or higher, or as directed    Pain, numbness, discoloration, or coldness in the foot or toes    Pain that gets worse    Symptoms that don t get better, or get worse    New symptoms   Date Last Reviewed: 3/10/2016    3677-0209 [employer]. [address] [place] [address]. All rights reserved. This information is not intended as a substitute for professional medical care. Always follow your healthcare professional's instructions.                Follow-ups after your visit        Your next 10 appointments already scheduled     Feb 04, 2019  8:00 AM CST   [facility]    [address] [place] [address] [phone]           Please do not eat 10-12 hours before your appointment if you are coming in fasting for labs on lipids,  "cholesterol, or glucose (sugar). This does not apply to pregnant women. Water, hot tea and black coffee (with nothing added) are okay. Do not drink other fluids, diet soda or chew gum.            Feb 07, 2019 10:20 AM CST   Return Visit with Fransisco Montana MD   [facility]    [address]  [place] [id number] [phone]              Who to contact     If you have questions or need follow up information about today's clinic visit or your schedule please contact [facility] directly at [phone].  Normal or non-critical lab and imaging results will be communicated to you by Unirisxhart, letter or phone within 4 business days after the clinic has received the results. If you do not hear from us within 7 days, please contact the clinic through Paxfiret or phone. If you have a critical or abnormal lab result, we will notify you by phone as soon as possible.  Submit refill requests through Miyaobabei or call your pharmacy and they will forward the refill request to us. Please allow 3 business days for your refill to be completed.          Additional Information About Your Visit        UnirisxharLearnhive Information     [employer] gives you secure access to your electronic health record. If you see a primary care provider, you can also send messages to your care team and make appointments. If you have questions, please call your primary care clinic.  If you do not have a primary care provider, please call [phone] and they will assist you.        Care EveryWhere ID     This is your Care EveryWhere ID. This could be used by other organizations to access your [place] medical records  [id number]        Your Vitals Were     Pulse Temperature Respirations Height BMI (Body Mass Index)       84 98.1  F (36.7  C) (Tympanic) 18 5' 5\" (1.651 m) 43.6 kg/m2        Blood Pressure from Last 3 Encounters:   12/04/18 132/70   10/04/18 (!) 156/98   08/23/18 113/75    Weight from " Last 3 Encounters:   12/04/18 262 lb (118.8 kg)   08/23/18 246 lb (111.6 kg)   06/26/18 260 lb (117.9 kg)                 Today's Medication Changes          These changes are accurate as of 12/4/18  3:19 PM.  If you have any questions, ask your nurse or doctor.               Start taking these medicines.        Dose/Directions    HYDROcodone-acetaminophen 5-325 MG tablet   Commonly known as:  NORCO   Used for:  Sprain of left ankle, unspecified ligament, initial encounter   Started by:  Judy Howard APRN CNP        Dose:  1 tablet   Take 1 tablet by mouth every 12 hours as needed for pain   Quantity:  30 tablet   Refills:  0       order for DME   Used for:  Sprain of left ankle, unspecified ligament, initial encounter   Started by:  Judy Howard APRN CNP        Knee scooter   Quantity:  1 Units   Refills:  0         Stop taking these medicines if you haven't already. Please contact your care team if you have questions.     vitamin D2 68343 units (1250 mcg) capsule   Commonly known as:  ERGOCALCIFEROL   Stopped by:  Judy Howard APRN CNP                Where to get your medicines      Some of these will need a paper prescription and others can be bought over the counter.  Ask your nurse if you have questions.     Bring a paper prescription for each of these medications     HYDROcodone-acetaminophen 5-325 MG tablet    order for DME               Information about OPIOIDS     PRESCRIPTION OPIOIDS: WHAT YOU NEED TO KNOW   We gave you an opioid (narcotic) pain medicine. It is important to manage your pain, but opioids are not always the best choice. You should first try all the other options your care team gave you. Take this medicine for as short a time (and as few doses) as possible.    Some activities can increase your pain, such as bandage changes or therapy sessions. It may help to take your pain medicine 30 to 60 minutes before these activities. Reduce your stress by getting enough sleep, working on  hobbies you enjoy and practicing relaxation or meditation. Talk to your care team about ways to manage your pain beyond prescription opioids.    These medicines have risks:    DO NOT drive when on new or higher doses of pain medicine. These medicines can affect your alertness and reaction times, and you could be arrested for driving under the influence (DUI). If you need to use opioids long-term, talk to your care team about driving.    DO NOT operate heavy machinery    DO NOT do any other dangerous activities while taking these medicines.    DO NOT drink any alcohol while taking these medicines.     If the opioid prescribed includes acetaminophen, DO NOT take with any other medicines that contain acetaminophen. Read all labels carefully. Look for the word  acetaminophen  or  Tylenol.  Ask your pharmacist if you have questions or are unsure.    You can get addicted to pain medicines, especially if you have a history of addiction (chemical, alcohol or substance dependence). Talk to your care team about ways to reduce this risk.    All opioids tend to cause constipation. Drink plenty of water and eat foods that have a lot of fiber, such as fruits, vegetables, prune juice, apple juice and high-fiber cereal. Take a laxative (Miralax, milk of magnesia, Colace, Senna) if you don t move your bowels at least every other day. Other side effects include upset stomach, sleepiness, dizziness, throwing up, tolerance (needing more of the medicine to have the same effect), physical dependence and slowed breathing.    Store your pills in a secure place, locked if possible. We will not replace any lost or stolen medicine. If you don t finish your medicine, please throw away (dispose) as directed by your pharmacist. The [place] Pollution Control Agency has more information about safe disposal: https://www.pca.[facility].[place].us/living-green/managing-unwanted-medications         Primary Care Provider Office Phone # Fax #    Zay Madrid  [phone]       [address] [address]        Equal Access to Services     JEFFERY CHIANG : Hadii aad ku hadraleigheliz Penny, lexymichael perezambarha, dominicknick casperjoanmichael tolentinosimón, waxjoseph sidneyin hayaaniesha tolentinosolomon zamora lacornellniesha paula. So [facility] [phone].    ATENCIÓN: Si habla español, tiene a cabrales disposición servicios gratuitos de asistencia lingüística. Llame al [phone].    We comply with applicable federal civil rights laws and [place] laws. We do not discriminate on the basis of race, color, national origin, age, disability, sex, sexual orientation, or gender identity.            Thank you!     Thank you for choosing [facility]  for your care. Our goal is always to provide you with excellent care. Hearing back from our patients is one way we can continue to improve our services. Please take a few minutes to complete the written survey that you may receive in the mail after your visit with us. Thank you!             Your Updated Medication List - Protect others around you: Learn how to safely use, store and throw away your medicines at www.disposemymeds.org.          This list is accurate as of 12/4/18  3:19 PM.  Always use your most recent med list.                   Brand Name Dispense Instructions for use Diagnosis    albuterol 108 (90 Base) MCG/ACT inhaler    PROAIR HFA/PROVENTIL HFA/VENTOLIN HFA     Inhale 2 puffs into the lungs every 6 hours        HYDROcodone-acetaminophen 5-325 MG tablet    NORCO    30 tablet    Take 1 tablet by mouth every 12 hours as needed for pain    Sprain of left ankle, unspecified ligament, initial encounter       LYRICA 75 MG capsule   Generic drug:  pregabalin      Take 75 mg by mouth 2 times daily        methotrexate sodium 50 MG/2ML Soln     4 vial    Inject 0.8 mLs (20 mg) as directed every 7 days Office visit for further refills.    Rheumatoid arthritis involving multiple sites with positive rheumatoid factor (H)        order for DME     1 Units    Knee scooter    Sprain of left ankle, unspecified ligament, initial encounter       PROTONIX PO

## 2018-12-04 NOTE — PROGRESS NOTES
SUBJECTIVE:   Maris Wagner is a 43 year old female who presents to clinic today for the following health issues:    FootPain    Onset: one week    Description:   Location: left foot  Character: Stabbing    Intensity: moderate    Progression of Symptoms: same    Accompanying Signs & Symptoms:  Other symptoms: swelling, bruising    History:   Previous similar pain: no       Precipitating factors:   Trauma or overuse: YES    Alleviating factors:  Improved by: nothing    Therapies Tried and outcome: naproxen, elevation, rest        Problem list and histories reviewed & adjusted, as indicated.  Additional history: as documented    Patient Active Problem List   Diagnosis     CARDIOVASCULAR SCREENING; LDL GOAL LESS THAN 130     Ileus, postoperative (H)     Spinal stenosis of lumbar region     Anemia     Gastroesophageal reflux disease     Irritable bowel syndrome     Seasonal allergies     Obesity     S/P lumbar discectomy     Chronic back pain     Recurrent herniation of lumbar disc     Herniation of lumbar intervertebral disc without myelopathy     Rheumatoid arthritis involving multiple sites with positive rheumatoid factor (H)     Morbid obesity (H)     Past Surgical History:   Procedure Laterality Date     ARTHROSCOPY ANKLE, OPEN REPAIR LIGAMENT, COMBINED Right 8/3/2017    Procedure: COMBINED ARTHROSCOPY ANKLE, OPEN REPAIR LIGAMENT;  Right Ankle Arthroscopic Evaluation and Debridement,Lateral Ligament Repair,Fracture Evaluation, Open Reduction Internal Fixation ;  Surgeon: Clint Simon DPM;  Location: [facility]     OPEN REDUCTION INTERNAL FIXATION ANKLE Right 8/3/2017    Procedure: OPEN REDUCTION INTERNAL FIXATION ANKLE;;  Surgeon: Clint Simon DPM;  Location: [facility]     REMOVE FOREIGN BODY FINGER Left 3/28/2017    Procedure: REMOVE FOREIGN BODY FINGER;  Surgeon: Tabby Chan MD;  Location: [facility]     TUBAL LIGATION         Social History   Substance Use Topics     Smoking status: Current Every  Day Smoker     Packs/day: 0.50     Years: 25.00     Types: Cigarettes     Smokeless tobacco: Never Used     Alcohol use Yes      Comment: rare     Family History   Problem Relation Age of Onset     Diabetes Mother      pre diabetes     Arthritis Mother      Hypertension Father      Cancer Maternal Grandmother      Cancer Maternal Grandfather      Cancer Paternal Grandmother      Cancer Paternal Grandfather      mesothelioma         Current Outpatient Prescriptions   Medication Sig Dispense Refill     albuterol (PROAIR HFA/PROVENTIL HFA/VENTOLIN HFA) 108 (90 BASE) MCG/ACT Inhaler Inhale 2 puffs into the lungs every 6 hours       methotrexate sodium 50 MG/2ML SOLN Inject 0.8 mLs (20 mg) as directed every 7 days Office visit for further refills. 4 vial 5     Pantoprazole Sodium (PROTONIX PO)        pregabalin (LYRICA) 75 MG capsule Take 75 mg by mouth 2 times daily       Allergies   Allergen Reactions     Nka [No Known Allergies]      Recent Labs   Lab Test  08/23/18   1347  06/27/18   0020  01/25/18   0908   10/26/17   0919   02/02/15   0635   ALT  33  14  19   < >  15   < >   --    CR  0.78  0.88  0.85   < >  0.70   < >   --    GFRESTIMATED  81  70  73   < >  >90   < >   --    GFRESTBLACK  >90  85  89   < >  >90   < >   --    POTASSIUM   --   4.0   --    --   4.2   < >   --    TSH   --    --    --    --    --    --   2.78    < > = values in this interval not displayed.      BP Readings from Last 3 Encounters:   12/04/18 132/70   10/04/18 (!) 156/98   08/23/18 113/75    Wt Readings from Last 3 Encounters:   12/04/18 262 lb (118.8 kg)   08/23/18 246 lb (111.6 kg)   06/26/18 260 lb (117.9 kg)                    Reviewed and updated as needed this visit by clinical staff       Reviewed and updated as needed this visit by Provider         ROS:  Constitutional, HEENT, cardiovascular, pulmonary, gi and gu systems are negative, except as otherwise noted.    OBJECTIVE:     /70 (BP Location: Right arm, Cuff Size: Adult  "Large)  Pulse 84  Temp 98.1  F (36.7  C) (Tympanic)  Resp 18  Ht 5' 5\" (1.651 m)  Wt 262 lb (118.8 kg)  BMI 43.6 kg/m2  Body mass index is 43.6 kg/(m^2).  GENERAL: healthy, alert and no distress  EYES: Eyes grossly normal to inspection, PERRL and conjunctivae and sclerae normal  NECK: no adenopathy, no asymmetry, masses, or scars and thyroid normal to palpation  RESP: lungs clear to auscultation - no rales, rhonchi or wheezes  CV: regular rate and rhythm, normal S1 S2, no S3 or S4, no murmur, click or rub, no peripheral edema and peripheral pulses strong  MS: no gross musculoskeletal defects noted, no edema  SKIN: no suspicious lesions or rashes  NEURO: Normal strength and tone, mentation intact and speech normal  PSYCH: mentation appears normal, affect normal/bright      ANKLE  Inspection:Swelling:diffuse   Tender:lateral malleolus, medial malleolus, 5th metatarsal base  Non-tender:ATFL, CFL, PTFL, deltoid ligament, anterior tib-fib ligament, mid-fibula shaft, proximal fibula, distal tibia  Range of Motion:dorsiflexion:  painful, plantarflexion:  painful, inversion:  painful, eversion:  painful  Strength:dorsiflexion:  painful, plantarflexion:  painful, inversion: painful, eversion:painful  Special tests:positive anterior drawer, positive talar tilt      FOOT  foot exam : Inspection Palpation:   Swelling: dorsum swelling  Tender::peroneal tendon:  at proximal 5th metatarsal  Non-tender:calcaneous , cuboid, navicular, cuneiform lateral, cuneiform middle, cuneiform medial , metatarsal heads, posterior tibial tendon at medial malleolus, posterior tibial tendon at navicular, plantar fascia  Range of Motion:flexion of toes:  full, extension of toes  full    XR ANKLE LT G/E 3 VW, XR FOOT LT G/E 3 VW 12/4/2018 3:03 PM      HISTORY: Ankle injury post surgery june 2018; Ankle injury, left,  initial encounter         IMPRESSION: No apparent fracture. The ankle mortise appears congruent.  The foot appears within normal " limits.     FANNY DAWN MD    ASSESSMENT/PLAN:   (S93.885A) Sprain of left ankle, unspecified ligament, initial encounter  (primary encounter diagnosis)  Comment: Xray negative for fracture.  Symptoms representative for sprain.  Patient to wear cam boot and knee scooter.  Is to follow-up with her orthopedic surgeon    Plan: HYDROcodone-acetaminophen (NORCO) 5-325 MG         tablet, order for DME        (B72.532Q) Ankle injury, left, initial encounter  Comment:   Plan: XR Ankle Left G/E 3 Views           (E33.594G) Injury of left foot, initial encounter  Comment:   Plan: XR Foot Left G/E 3 Views            CORINA [facility]

## 2018-12-05 ASSESSMENT — ANXIETY QUESTIONNAIRES: GAD7 TOTAL SCORE: 7

## 2018-12-26 ENCOUNTER — HOSPITAL ENCOUNTER (EMERGENCY)
Facility: CLINIC | Age: 43
Discharge: HOME | End: 2018-12-26
Attending: EMERGENCY MEDICINE | Admitting: EMERGENCY MEDICINE
Payer: MEDICARE

## 2018-12-26 VITALS
HEART RATE: 84 BPM | SYSTOLIC BLOOD PRESSURE: 129 MMHG | TEMPERATURE: 98.9 F | RESPIRATION RATE: 20 BRPM | DIASTOLIC BLOOD PRESSURE: 87 MMHG | OXYGEN SATURATION: 98 %

## 2018-12-26 DIAGNOSIS — Z92.25 HISTORY OF IMMUNOSUPPRESSIVE THERAPY: ICD-10-CM

## 2018-12-26 DIAGNOSIS — J20.9 ACUTE BRONCHITIS, UNSPECIFIED ORGANISM: ICD-10-CM

## 2018-12-26 LAB
ANION GAP SERPL CALCULATED.3IONS-SCNC: 5 MMOL/L (ref 3–14)
BASOPHILS # BLD AUTO: 0 10E9/L (ref 0–0.2)
BASOPHILS NFR BLD AUTO: 0.2 %
BUN SERPL-MCNC: 14 MG/DL (ref 7–30)
CALCIUM SERPL-MCNC: 7.9 MG/DL (ref 8.5–10.1)
CHLORIDE SERPL-SCNC: 106 MMOL/L (ref 94–109)
CO2 SERPL-SCNC: 28 MMOL/L (ref 20–32)
CREAT SERPL-MCNC: 0.81 MG/DL (ref 0.52–1.04)
DIFFERENTIAL METHOD BLD: ABNORMAL
EOSINOPHIL # BLD AUTO: 0.2 10E9/L (ref 0–0.7)
EOSINOPHIL NFR BLD AUTO: 2.5 %
ERYTHROCYTE [DISTWIDTH] IN BLOOD BY AUTOMATED COUNT: 19.2 % (ref 10–15)
GFR SERPL CREATININE-BSD FRML MDRD: 89 ML/MIN/{1.73_M2}
GLUCOSE SERPL-MCNC: 103 MG/DL (ref 70–99)
HCT VFR BLD AUTO: 37.3 % (ref 35–47)
HGB BLD-MCNC: 11.7 G/DL (ref 11.7–15.7)
IMM GRANULOCYTES # BLD: 0.1 10E9/L (ref 0–0.4)
IMM GRANULOCYTES NFR BLD: 0.6 %
LYMPHOCYTES # BLD AUTO: 1.6 10E9/L (ref 0.8–5.3)
LYMPHOCYTES NFR BLD AUTO: 18.1 %
MCH RBC QN AUTO: 29 PG (ref 26.5–33)
MCHC RBC AUTO-ENTMCNC: 31.4 G/DL (ref 31.5–36.5)
MCV RBC AUTO: 93 FL (ref 78–100)
MONOCYTES # BLD AUTO: 0.8 10E9/L (ref 0–1.3)
MONOCYTES NFR BLD AUTO: 8.3 %
NEUTROPHILS # BLD AUTO: 6.4 10E9/L (ref 1.6–8.3)
NEUTROPHILS NFR BLD AUTO: 70.3 %
NRBC # BLD AUTO: 0 10*3/UL
NRBC BLD AUTO-RTO: 0 /100
PLATELET # BLD AUTO: 242 10E9/L (ref 150–450)
POTASSIUM SERPL-SCNC: 4 MMOL/L (ref 3.4–5.3)
RBC # BLD AUTO: 4.03 10E12/L (ref 3.8–5.2)
SODIUM SERPL-SCNC: 139 MMOL/L (ref 133–144)
TROPONIN I SERPL-MCNC: <0.015 UG/L (ref 0–0.04)
WBC # BLD AUTO: 9.1 10E9/L (ref 4–11)

## 2018-12-26 PROCEDURE — 84484 ASSAY OF TROPONIN QUANT: CPT | Performed by: EMERGENCY MEDICINE

## 2018-12-26 PROCEDURE — 93005 ELECTROCARDIOGRAM TRACING: CPT | Performed by: EMERGENCY MEDICINE

## 2018-12-26 PROCEDURE — 99284 EMERGENCY DEPT VISIT MOD MDM: CPT | Performed by: EMERGENCY MEDICINE

## 2018-12-26 PROCEDURE — 85025 COMPLETE CBC W/AUTO DIFF WBC: CPT | Performed by: EMERGENCY MEDICINE

## 2018-12-26 PROCEDURE — 80048 BASIC METABOLIC PNL TOTAL CA: CPT | Performed by: EMERGENCY MEDICINE

## 2018-12-26 PROCEDURE — 93010 ELECTROCARDIOGRAM REPORT: CPT | Mod: Z6 | Performed by: EMERGENCY MEDICINE

## 2018-12-26 PROCEDURE — 99284 EMERGENCY DEPT VISIT MOD MDM: CPT | Mod: 25 | Performed by: EMERGENCY MEDICINE

## 2018-12-26 RX ORDER — AZITHROMYCIN 250 MG/1
TABLET, FILM COATED ORAL
Qty: 6 TABLET | Refills: 0 | Status: SHIPPED | OUTPATIENT
Start: 2018-12-26 | End: 2018-12-31

## 2018-12-26 NOTE — ED NOTES
"Patient here for cough and fever. Patient thinks \"I have pneumonia,\" last diagnosis was in June.   Patient has a history of heart attack. Dry cough, is a smoker. Feverish and clammy, has not taken tempurature. Has not taken anything for discomfort. Chest \"hurts.\" Back \"hurts.\" Normal appetite, normal bowel and bladder.   "

## 2018-12-26 NOTE — ED AVS SNAPSHOT
[facility] Emergency Department  [address] [address]  Phone:  [phone]  Fax:  [phone]                                    Maris Wagner   MRN: [id number]    Department:  [facility] Emergency Department   Date of Visit:  12/26/2018           After Visit Summary Signature Page    I have received my discharge instructions, and my questions have been answered. I have discussed any challenges I see with this plan with the nurse or doctor.    ..........................................................................................................................................  Patient/Patient Representative Signature      ..........................................................................................................................................  Patient Representative Print Name and Relationship to Patient    ..................................................               ................................................  Date                                   Time    ..........................................................................................................................................  Reviewed by Signature/Title    ...................................................              ..............................................  Date                                               Time          22EPIC Rev 08/18

## 2018-12-26 NOTE — ED PROVIDER NOTES
History     Chief Complaint   Patient presents with     Cough     HPI  Maris Wagner is a 43 year old female with 5-6 days of URI sx with non-productive, but foul tasting sputum, sinus drainage and congestion, wheezing and voice hoarseness. Several days of chest and back pain exac by coughing. No F/C/V/D. No hemoptysis or leg pain or swelling.     Problem List:    Patient Active Problem List    Diagnosis Date Noted     Morbid obesity (H) 08/23/2018     Priority: Medium     Rheumatoid arthritis involving multiple sites with positive rheumatoid factor (H) 09/25/2017     Priority: Medium     Chronic back pain 04/12/2017     Priority: Medium     Recurrent herniation of lumbar disc 12/28/2016     Priority: Medium     S/P lumbar discectomy 07/18/2016     Priority: Medium     Anemia 02/02/2015     Priority: Medium     Irritable bowel syndrome 02/02/2015     Priority: Medium     Obesity 02/02/2015     Priority: Medium     Gastroesophageal reflux disease      Priority: Medium     Diagnosis updated by automated process. Provider to review and confirm.       Ileus, postoperative (H) 01/31/2015     Priority: Medium     Spinal stenosis of lumbar region 01/29/2015     Priority: Medium     Herniation of lumbar intervertebral disc without myelopathy 01/29/2015     Priority: Medium     CARDIOVASCULAR SCREENING; LDL GOAL LESS THAN 130 12/10/2012     Priority: Medium     Seasonal allergies 02/02/2015     Priority: Low        Past Medical History:    History reviewed. No pertinent past medical history.    Past Surgical History:    Past Surgical History:   Procedure Laterality Date     ARTHROSCOPY ANKLE, OPEN REPAIR LIGAMENT, COMBINED Right 8/3/2017    Procedure: COMBINED ARTHROSCOPY ANKLE, OPEN REPAIR LIGAMENT;  Right Ankle Arthroscopic Evaluation and Debridement,Lateral Ligament Repair,Fracture Evaluation, Open Reduction Internal Fixation ;  Surgeon: Clint Simon DPM;  Location: [facility]     OPEN REDUCTION INTERNAL FIXATION  ANKLE Right 8/3/2017    Procedure: OPEN REDUCTION INTERNAL FIXATION ANKLE;;  Surgeon: Clint Simon DPM;  Location: [facility]     REMOVE FOREIGN BODY FINGER Left 3/28/2017    Procedure: REMOVE FOREIGN BODY FINGER;  Surgeon: Tabby Chan MD;  Location: [facility] OR     TUBAL LIGATION         Family History:    Family History   Problem Relation Age of Onset     Diabetes Mother         pre diabetes     Arthritis Mother      Hypertension Father      Cancer Maternal Grandmother      Cancer Maternal Grandfather      Cancer Paternal Grandmother      Cancer Paternal Grandfather         mesothelioma       Social History:  Marital Status:  Single [1]  Social History     Tobacco Use     Smoking status: Current Every Day Smoker     Packs/day: 0.50     Years: 25.00     Pack years: 12.50     Types: Cigarettes     Smokeless tobacco: Never Used   Substance Use Topics     Alcohol use: Yes     Comment: rare     Drug use: No        Medications:      albuterol (PROAIR HFA/PROVENTIL HFA/VENTOLIN HFA) 108 (90 BASE) MCG/ACT Inhaler   azithromycin (ZITHROMAX Z-RIA) 250 MG tablet   methotrexate sodium 50 MG/2ML SOLN   Pantoprazole Sodium (PROTONIX PO)   pregabalin (LYRICA) 75 MG capsule   order for DME       Review of Systems  As mentioned above in the history present illness.  All other systems were reviewed and are negative.    Physical Exam   BP: 129/87  Pulse: 84  Temp: 98.9  F (37.2  C)  Resp: 20  SpO2: 98 %      Physical Exam   Constitutional: She is oriented to person, place, and time. She appears well-developed and well-nourished. No distress.   HENT:   Head: Normocephalic and atraumatic.   Mouth/Throat: Oropharynx is clear and moist.   Eyes: Conjunctivae and EOM are normal. No scleral icterus.   Neck: Normal range of motion. Neck supple. No tracheal deviation present.   Cardiovascular: Normal rate, regular rhythm and normal heart sounds. Exam reveals no gallop and no friction rub.   No murmur heard.  Pulmonary/Chest: Effort  normal and breath sounds normal. No respiratory distress. She has no wheezes. She has no rales.   Abdominal: Soft. She exhibits no distension. There is no tenderness.   Musculoskeletal: Normal range of motion. She exhibits no edema or tenderness.   Neurological: She is alert and oriented to person, place, and time. Coordination normal.   Skin: Skin is warm and dry. No rash noted. She is not diaphoretic. No erythema. No pallor.   Psychiatric: She has a normal mood and affect. Her behavior is normal.   Nursing note and vitals reviewed.      ED Course        Procedures               EKG Interpretation:      Interpreted by Agustin Randhawa MD  Time reviewed: upon completion   Symptoms at time of EKG: Chest and back pain  Rhythm: Normal sinus   Rate: Normal  Axis: Normal  Ectopy: None  Conduction: Normal  ST Segments/ T Waves: No ST-T wave changes  Q Waves: None  Comparison to prior: Unchanged from 6/27/18  Clinical Impression: normal EKG         Results for orders placed or performed during the hospital encounter of 12/26/18 (from the past 24 hour(s))   CBC with platelets differential   Result Value Ref Range    WBC 9.1 4.0 - 11.0 10e9/L    RBC Count 4.03 3.8 - 5.2 10e12/L    Hemoglobin 11.7 11.7 - 15.7 g/dL    Hematocrit 37.3 35.0 - 47.0 %    MCV 93 78 - 100 fl    MCH 29.0 26.5 - 33.0 pg    MCHC 31.4 (L) 31.5 - 36.5 g/dL    RDW 19.2 (H) 10.0 - 15.0 %    Platelet Count 242 150 - 450 10e9/L    Diff Method Automated Method     % Neutrophils 70.3 %    % Lymphocytes 18.1 %    % Monocytes 8.3 %    % Eosinophils 2.5 %    % Basophils 0.2 %    % Immature Granulocytes 0.6 %    Nucleated RBCs 0 0 /100    Absolute Neutrophil 6.4 1.6 - 8.3 10e9/L    Absolute Lymphocytes 1.6 0.8 - 5.3 10e9/L    Absolute Monocytes 0.8 0.0 - 1.3 10e9/L    Absolute Eosinophils 0.2 0.0 - 0.7 10e9/L    Absolute Basophils 0.0 0.0 - 0.2 10e9/L    Abs Immature Granulocytes 0.1 0 - 0.4 10e9/L    Absolute Nucleated RBC 0.0    Basic metabolic panel   Result  Value Ref Range    Sodium 139 133 - 144 mmol/L    Potassium 4.0 3.4 - 5.3 mmol/L    Chloride 106 94 - 109 mmol/L    Carbon Dioxide 28 20 - 32 mmol/L    Anion Gap 5 3 - 14 mmol/L    Glucose 103 (H) 70 - 99 mg/dL    Urea Nitrogen 14 7 - 30 mg/dL    Creatinine 0.81 0.52 - 1.04 mg/dL    GFR Estimate 89 >60 mL/min/[1.73_m2]    GFR Estimate If Black >90 >60 mL/min/[1.73_m2]    Calcium 7.9 (L) 8.5 - 10.1 mg/dL   Troponin I   Result Value Ref Range    Troponin I ES <0.015 0.000 - 0.045 ug/L       Medications - No data to display    Assessments & Plan (with Medical Decision Making)   Acute bronchitis with musculoskeletal chest and back pain. No hypoxia or respiratory distress. Doubt PE/DVT, pneumonia, CHF. CXR deferred as it will not , she concurs. Rx Azithromycin as she is on Methotrexate immunosuppressant therapy for RA. Patient was provided instructions for supportive care and will return as needed for worsened condition or worsening symptoms, or new problems or concerns.      I have reviewed the nursing notes.    I have reviewed the findings, diagnosis, plan and need for follow up with the patient.       Medication List      Started    azithromycin 250 MG tablet  Commonly known as:  ZITHROMAX Z-RIA  Two tablets on the first day, then one tablet daily for the next 4 days            Final diagnoses:   Acute bronchitis, unspecified organism       12/26/2018   [facility] EMERGENCY DEPARTMENT     Agustin Randhawa MD  12/26/18 1029       Agustin Randhawa MD  01/01/19 1129

## 2018-12-27 ENCOUNTER — PATIENT OUTREACH (OUTPATIENT)
Dept: CARE COORDINATION | Facility: CLINIC | Age: 43
End: 2018-12-27

## 2018-12-27 NOTE — PROGRESS NOTES
Clinic Care Coordination Contact  [facility]/Voicemail    Referral Source: ED Follow-Up    Clinical Data: Care Coordinator Outreach, ED 12/26/18 for bronchitis.    Outreach attempted x 1.  Left message on voicemail with call back information and requested return call.    Plan: Care Coordinator will mail out care coordination introduction letter with care coordinator contact information and explanation of care coordination services. Care Coordinator will try to reach patient again in 1-2 business days.    Nadiya Weiss RN, [facility]  RN Care Coordinator  [facility], [facility]  Phone # [phone]  12/27/2018 9:40 AM

## 2018-12-27 NOTE — LETTER
[facility] CARE COORDINATION  [address] [place] [address]  [phone]    December 27, 2018    Maris Wagner  [address]  [facility] [address]      Dear Maris,    I am a clinic care coordinator who works with CORINA Grande CNP at [facility]. I have been trying to reach you recently to introduce Clinic Care Coordination and to see if there was anything I could assist you with.  I wanted to introduce myself and provide you with my contact information so that you can call me with questions or concerns about your health care. Below is a description of clinic care coordination and how I can further assist you.     The clinic care coordinator is a registered nurse and/or  who understand the health care system. The goal of clinic care coordination is to help you manage your health and improve access to the [facility] system in the most efficient manner. The registered nurse can assist you in meeting your health care goals by providing education, coordinating services, and strengthening the communication among your providers. The  can assist you with financial, behavioral, psychosocial, chemical dependency, counseling, and/or psychiatric resources.    Please feel free to contact me at [phone], with any questions or concerns. We at [facility] are focused on providing you with the highest-quality healthcare experience possible and that all starts with you.     Sincerely,     Nadiya Weiss        Enclosed: I have enclosed a copy of a 24 Hour Access Plan. This has helpful phone numbers for you to call when needed. Please keep this in an easy to access place to use as needed.   I have sent a Consent to Communicate form for you to complete (as you wish) to allow us to discuss/share your personal health information with whomever you choose on your behalf.   I have highlighted the areas for you to review/address.  Please complete all  "that apply.  Please check the box for medical information if you allow the clinic to share personal health information with whomever you choose.  This form must be signed and dated to be valid.  If you check all boxes, please be aware that checking \"nothing\" despite checking other boxes will allow no information to be shared.      If you wish not to have information regarding your mental health, chemical health, AIDS/HIV or sickle cell anemia, then please initial the second bullet point at the end of the form.  If you allow this information to be shared with those noted above on the form, then leave this area blank and do not initial.      This form does not , you can make a change to this document at any time.    "

## 2018-12-27 NOTE — LETTER
Health Care Home - Access Care Plan    About Me  Patient Name:  Maris Claudio    YOB: 1975  Age:                             43 year old   Rashad MRN:            [id number] Telephone Information:   Home Phone [phone]   Mobile [phone]       Address:    [address]  [place] [address] Email address:  [email or web address]      Emergency Contact(s)  Name Relationship Lgl Grd Work Phone Home Phone Mobile Phone   1. ZANE CLAUDIO  Mother   [phone]    2. JUAN MIGUEL LEARY Significant ot*   [phone]             Health Maintenance: Routine Health maintenance Reviewed: Due/Overdue   Health Maintenance Due   Topic Date Due     URINE DRUG SCREEN Q1 YR  06/08/1991     HIV SCREEN (SYSTEM ASSIGNED)  06/08/1994     INFLUENZA VACCINE (1) 09/01/2018     Pt to talk with provider about what is needed or can continue wait.       My Access Plan  Medical Emergency 911   Questions or concerns during clinic hours Primary Clinic Line, I will call the clinic directly:   - [phone]   24 Hour Appointment Line [phone] or  [phone]) (toll free)   24 Hour Nurse Line [phone] (toll free)   Questions or concerns outside clinic hours 24 Hour Appointment Line, I will call the after-hours on-call line:   [facility] [phone] or [phone]) (toll-free)   Preferred Urgent Care [facility] [phone]   Preferred Hospital [place], [place]  [phone]   Preferred Pharmacy [id number] - [address]     Behavioral Health Crisis Line The National Suicide Prevention Lifeline at 1-592.727.4812 or 917     My Care Team Members  Patient Care Team       Relationship Specialty Notifications Start End    Judy Howard APRN CNP PCP - General Nurse Practitioner - Family  12/7/18     Phone: [phone] Fax: [phone] [address] [place] [address]    Anita  CORINA Cedeño CNP PCP - Assigned PCP   9/23/18     Phone: [phone] Fax: [phone] [address] [place] [address]    Nadiya Weiss, RN Clinic Care Coordinator Primary Care - CC  12/27/18     Phone: [phone] Fax: [phone]               My Medical and Care Information  Problem List   Patient Active Problem List   Diagnosis     CARDIOVASCULAR SCREENING; LDL GOAL LESS THAN 130     Ileus, postoperative (H)     Spinal stenosis of lumbar region     Anemia     Gastroesophageal reflux disease     Irritable bowel syndrome     Seasonal allergies     Obesity     S/P lumbar discectomy     Chronic back pain     Recurrent herniation of lumbar disc     Herniation of lumbar intervertebral disc without myelopathy     Rheumatoid arthritis involving multiple sites with positive rheumatoid factor (H)     Morbid obesity (H)      Current Medications and Allergies:  See printed Medication Report

## 2018-12-28 NOTE — PROGRESS NOTES
Clinic Care Coordination Contact    Clinic Care Coordination Contact  OUTREACH    Referral Information:  Referral Source: ED Follow-Up    Primary Diagnosis: Respiratory Disorders - other    Chief Complaint   Patient presents with     ER F/U     Nurse: ER f/u 12/26/18 Bronchitis        Universal Utilization: No concerns at this time.  Clinic Utilization  Difficulty keeping appointments:: Yes  Compliance Concerns: Yes  No-Show Concerns: Yes  No PCP office visit in Past Year: No  Utilization    Last refreshed: 12/28/2018  9:57 AM:  Hospital Admissions 0           Last refreshed: 12/28/2018  9:57 AM:  ED Visits 5           Last refreshed: 12/28/2018  9:57 AM:  No Show Count (past year) 3              Current as of: 12/28/2018  9:57 AM              Clinical Concerns:  Patient called Care Coordinator RN back she states she wakes up coughing once that clears then is ok until she starts talking then has voice issues. She states she is doing little better but still not the best. She states she has a history of getting pneumonia very easy, we agreed it might be best for her to see PCP next week. Care Coordinator RN assisted her in making an appointment to see PCP on 1/2/19 @ 1pm. She wants to discuss how she can avoid getting pneumonia.    Current Medical Concerns:    Patient Active Problem List   Diagnosis     CARDIOVASCULAR SCREENING; LDL GOAL LESS THAN 130     Ileus, postoperative (H)     Spinal stenosis of lumbar region     Anemia     Gastroesophageal reflux disease     Irritable bowel syndrome     Seasonal allergies     Obesity     S/P lumbar discectomy     Chronic back pain     Recurrent herniation of lumbar disc     Herniation of lumbar intervertebral disc without myelopathy     Rheumatoid arthritis involving multiple sites with positive rheumatoid factor (H)     Morbid obesity (H)         Current Behavioral Concerns: No concerns at this time.      Education Provided to patient: RN CC educated about Care Coordination  Services, discharge instructions, and follow up.         Health Maintenance Reviewed: Due/Overdue   Health Maintenance Due   Topic Date Due     URINE DRUG SCREEN Q1 YR  06/08/1991     HIV SCREEN (SYSTEM ASSIGNED)  06/08/1994     INFLUENZA VACCINE (1) 09/01/2018       Clinical Pathway: None    Medication Management:  Patient independent in medication management and verbalizes adherence and understanding of medication regimen.          Functional Status:  Bed or wheelchair confined:: No    Living Situation:  Current living arrangement:: Not Assessed    Diet/Exercise/Sleep:  Diet:: Regular    Transportation:  Transportation concerns (GOAL):: No  Transportation means:: Regular car     Psychosocial:  Buddhism or spiritual beliefs that impact treatment:: No  Mental health DX:: No  Mental health management concern (GOAL):: No     Financial/Insurance:   No concerns at this time.     Resources and Interventions:  Current Resources: RN CC mailed out to patient intro letter, access care plan and care coordination services brochure yesterday 12/27/18.    Community Resources: None  Supplies used at home:: None  Equipment Currently Used at Home: other (see comments)(knee scooter)    Advance Care Plan/Directive  Advanced Care Plans/Directives on file:: No       Patient/Caregiver understanding: yes       Future Appointments              In 5 days Judy Howard APRN CNP [facility]    In 1 month NB LAB [facility]    In 1 month Fransisco [place] MD [facility]          Plan:   No further Care Coordination needs identified at this time. Patient may be referred to Care Coordination in the future if additional needs arise.  Pt encouraged to contact Care Coordinator through the clinic if situation changes and assistance is needed. No follow-up planned.    Nadiya Weiss RN, [facility]  RN Care Coordinator  [facility]  [facility]  Phone # [phone]  12/28/2018 3:03 PM

## 2018-12-28 NOTE — PROGRESS NOTES
Clinic Care Coordination Contact  [facility]/Voicemail    Referral Source: ED Follow-Up    Clinical Data: Care Coordinator Outreach, ED 12/26/18 for bronchitis    Outreach attempted x 3.  Left message on voicemail with call back information and requested return call.    Plan: Care Coordinator mailed out care coordination introduction letter on 12/27/18. Care Coordinator will do no further outreaches at this time.    Nadiya Weiss RN, [facility]  RN Care Coordinator  [facility], [facility]  Phone # [phone]  12/28/2018 2:38 PM

## 2018-12-28 NOTE — PROGRESS NOTES
Clinic Care Coordination Contact  Care Team Conversations    Patient left Care Coordinator RN barbara  stating she was returning her call.    Plan:  Care Coordinator RN to call patient back.    Nadiya Weiss RN, [facility]  RN Care Coordinator  [facility]  Phone # [phone]  12/28/2018 2:36 PM

## 2018-12-28 NOTE — PROGRESS NOTES
Clinic Care Coordination Contact  [facility]/Voicemail    Referral Source: ED Follow-Up    Clinical Data: Care Coordinator Outreach, ED 12/26/18 for bronchitis    Outreach attempted x 2.  Left message on voicemail with call back information and requested return call.    Plan: Care Coordinator mailed out care coordination introduction letter on 12/27/18. Care Coordinator will do no further outreaches at this time.    Nadiya Weiss RN, [facility]  RN Care Coordinator  [facility], [facility]  Phone # [phone]  12/28/2018 9:10 AM

## 2019-01-02 ENCOUNTER — TELEPHONE (OUTPATIENT)
Dept: FAMILY MEDICINE | Facility: CLINIC | Age: 44
End: 2019-01-02

## 2019-01-02 ENCOUNTER — OFFICE VISIT (OUTPATIENT)
Dept: FAMILY MEDICINE | Facility: CLINIC | Age: 44
End: 2019-01-02
Payer: MEDICARE

## 2019-01-02 VITALS
OXYGEN SATURATION: 98 % | WEIGHT: 266 LBS | SYSTOLIC BLOOD PRESSURE: 130 MMHG | BODY MASS INDEX: 44.32 KG/M2 | HEART RATE: 87 BPM | DIASTOLIC BLOOD PRESSURE: 70 MMHG | HEIGHT: 65 IN | TEMPERATURE: 98.6 F

## 2019-01-02 DIAGNOSIS — J01.90 ACUTE SINUSITIS WITH SYMPTOMS > 10 DAYS: Primary | ICD-10-CM

## 2019-01-02 PROCEDURE — 99213 OFFICE O/P EST LOW 20 MIN: CPT | Performed by: NURSE PRACTITIONER

## 2019-01-02 RX ORDER — MOMETASONE FUROATE MONOHYDRATE 50 UG/1
2 SPRAY, METERED NASAL DAILY
Qty: 1 G | Refills: 0 | Status: SHIPPED | OUTPATIENT
Start: 2019-01-02 | End: 2019-01-22

## 2019-01-02 RX ORDER — DOXYCYCLINE HYCLATE 100 MG
100 TABLET ORAL 2 TIMES DAILY
Qty: 20 TABLET | Refills: 0 | Status: SHIPPED | OUTPATIENT
Start: 2019-01-02 | End: 2019-07-01

## 2019-01-02 ASSESSMENT — MIFFLIN-ST. JEOR: SCORE: 1867.45

## 2019-01-02 NOTE — TELEPHONE ENCOUNTER
FLUTICASONE SPR 50MCG  TIER 2  AZELASTINE SPR 0.1%  TIER 3    Patient doesn't want anything with a floral scent.   This is not on her formulary.   She didn't seem to want Flonase, but I don't know what scent Azelastine has.    Do you want to send a new rx for something else?    Thank you,    Batool Gutiérrez [facility]  On behalf of [facility]

## 2019-01-02 NOTE — PATIENT INSTRUCTIONS
Patient Education     Sinusitis (Antibiotic Treatment)    The sinuses are air-filled spaces within the bones of the face. They connect to the inside of the nose. Sinusitis is an inflammation of the tissue that lines the sinuses. Sinusitis can occur during a cold. It can also happen due to allergies to pollens and other particles in the air. Sinusitis can cause symptoms of sinus congestion and a feeling of fullness. A sinus infection causes fever, headache, and facial pain. There is often green or yellow fluid draining from the nose or into the back of the throat (post-nasal drip). You have been given antibiotics to treat this condition.  Home care    Take the full course of antibiotics as instructed. Do not stop taking them, even when you feel better.    Drink plenty of water, hot tea, and other liquids. This may help thin nasal mucus. It also may help your sinuses drain fluids.    Heat may help soothe painful areas of your face. Use a towel soaked in hot water. Or,  the shower and direct the warm spray onto your face. Using a vaporizer along with a menthol rub at night may also help soothe symptoms.     An expectorant with guaifenesin may help thin nasal mucus and help your sinuses drain fluids.    You can use an over-the-counter decongestant, unless a similar medicine was prescribed to you. Nasal sprays work the fastest. Use one that contains phenylephrine or oxymetazoline. First blow your nose gently. Then use the spray. Do not use these medicines more often than directed on the label. If you do, your symptoms may get worse. You may also take pills that contain pseudoephedrine. Don t use products that combine multiple medicines. This is because side effects may be increased. Read labels. You can also ask the pharmacist for help. (People with high blood pressure should not use decongestants. They can raise blood pressure.)    Over-the-counter antihistamines may help if allergies contributed to your  sinusitis.      Do not use nasal rinses or irrigation during an acute sinus infection, unless your healthcare provider tells you to. Rinsing may spread the infection to other areas in your sinuses.    Use acetaminophen or ibuprofen to control pain, unless another pain medicine was prescribed to you. If you have chronic liver or kidney disease or ever had a stomach ulcer, talk with your healthcare provider before using these medicines. (Aspirin should never be taken by anyone under age 18 who is ill with a fever. It may cause severe liver damage.)    Don't smoke. This can make symptoms worse.  Follow-up care  Follow up with your healthcare provider or our staff if you are better in 1 week.  When to seek medical advice  Call your healthcare provider if any of these occur:    Facial pain or headache that gets worse    Stiff neck    Unusual drowsiness or confusion    Swelling of your forehead or eyelids    Vision problems, such as blurred or double vision    Fever of 100.4 F (38 C) or higher, or as directed by your healthcare provider    Seizure    Breathing problems    Symptoms don't go away in 10 days  Prevention  Here are steps you can take to help prevent an infection:    Keep good hand washing habits.    Don t have close contact with people who have sore throats, colds, or other upper respiratory infections.    Don t smoke, and stay away from secondhand smoke.    Stay up to date with of your vaccines.  Date Last Reviewed: 11/1/2017 2000-2018 [employer]. [address] [place] [address], [place] [address]. All rights reserved. This information is not intended as a substitute for professional medical care. Always follow your healthcare professional's instructions.

## 2019-01-02 NOTE — PROGRESS NOTES
SUBJECTIVE:   Maris Wagner is a 43 year old female who presents to clinic today for the following health issues:    ED/UC Followup:    Facility:  [facility]  Date of visit: 12/26/18  Reason for visit: bronchitis  Current Status: Patient has been using an albuterol inhaler. She is not coughing or having trouble breathing but she is having a lot of sinus pressure.          Problem list and histories reviewed & adjusted, as indicated.  Additional history: as documented    Patient Active Problem List   Diagnosis     CARDIOVASCULAR SCREENING; LDL GOAL LESS THAN 130     Ileus, postoperative (H)     Spinal stenosis of lumbar region     Anemia     Gastroesophageal reflux disease     Irritable bowel syndrome     Seasonal allergies     Obesity     S/P lumbar discectomy     Chronic back pain     Recurrent herniation of lumbar disc     Herniation of lumbar intervertebral disc without myelopathy     Rheumatoid arthritis involving multiple sites with positive rheumatoid factor (H)     Morbid obesity (H)     Past Surgical History:   Procedure Laterality Date     ARTHROSCOPY ANKLE, OPEN REPAIR LIGAMENT, COMBINED Right 8/3/2017    Procedure: COMBINED ARTHROSCOPY ANKLE, OPEN REPAIR LIGAMENT;  Right Ankle Arthroscopic Evaluation and Debridement,Lateral Ligament Repair,Fracture Evaluation, Open Reduction Internal Fixation ;  Surgeon: Clint Simon DPM;  Location: [facility]     OPEN REDUCTION INTERNAL FIXATION ANKLE Right 8/3/2017    Procedure: OPEN REDUCTION INTERNAL FIXATION ANKLE;;  Surgeon: Clint Simon DPM;  Location: [facility]     REMOVE FOREIGN BODY FINGER Left 3/28/2017    Procedure: REMOVE FOREIGN BODY FINGER;  Surgeon: Tabby Chan MD;  Location: [facility]     TUBAL LIGATION         Social History     Tobacco Use     Smoking status: Current Every Day Smoker     Packs/day: 0.50     Years: 25.00     Pack years: 12.50     Types: Cigarettes     Smokeless tobacco: Never Used   Substance Use  "Topics     Alcohol use: Yes     Comment: rare     Family History   Problem Relation Age of Onset     Diabetes Mother         pre diabetes     Arthritis Mother      Hypertension Father      Cancer Maternal Grandmother      Cancer Maternal Grandfather      Cancer Paternal Grandmother      Cancer Paternal Grandfather         mesothelioma         Current Outpatient Medications   Medication Sig Dispense Refill     albuterol (PROAIR HFA/PROVENTIL HFA/VENTOLIN HFA) 108 (90 BASE) MCG/ACT Inhaler Inhale 2 puffs into the lungs every 6 hours       methotrexate sodium 50 MG/2ML SOLN Inject 0.8 mLs (20 mg) as directed every 7 days Office visit for further refills. 4 vial 5     Pantoprazole Sodium (PROTONIX PO) Take 40 mg by mouth daily        pregabalin (LYRICA) 75 MG capsule Take 100 mg by mouth 2 times daily        Allergies   Allergen Reactions     Nka [No Known Allergies]      Recent Labs   Lab Test 12/26/18  0946 08/23/18  1347 06/27/18  0020 01/25/18  0908  02/02/15  0635   ALT  --  33 14 19   < >  --    CR 0.81 0.78 0.88 0.85   < >  --    GFRESTIMATED 89 81 70 73   < >  --    GFRESTBLACK >90 >90 85 89   < >  --    POTASSIUM 4.0  --  4.0  --    < >  --    TSH  --   --   --   --   --  2.78    < > = values in this interval not displayed.      BP Readings from Last 3 Encounters:   01/02/19 130/70   12/26/18 129/87   12/04/18 132/70    Wt Readings from Last 3 Encounters:   01/02/19 120.7 kg (266 lb)   12/04/18 118.8 kg (262 lb)   08/23/18 111.6 kg (246 lb)                    Reviewed and updated as needed this visit by clinical staff       Reviewed and updated as needed this visit by Provider         ROS:  Constitutional, HEENT, cardiovascular, pulmonary, gi and gu systems are negative, except as otherwise noted.    OBJECTIVE:     /70 (BP Location: Right arm, Cuff Size: Adult Large)   Pulse 87   Temp 98.6  F (37  C) (Tympanic)   Ht 1.651 m (5' 5\")   Wt 120.7 kg (266 lb)   SpO2 98%   BMI 44.26 kg/m    Body mass " index is 44.26 kg/m .  GENERAL: healthy, alert and no distress  EYES: Eyes grossly normal to inspection, PERRL and conjunctivae and sclerae normal  HENT: ear canals and TM's normal, nose and mouth without ulcers or lesions  HENT: Maxillary sinus tenderness, bilateral turbinates inflamed and edematous    NECK: no adenopathy, no asymmetry, masses, or scars and thyroid normal to palpation  RESP: lungs clear to auscultation - no rales, rhonchi or wheezes  CV: regular rate and rhythm, normal S1 S2, no S3 or S4, no murmur, click or rub, no peripheral edema and peripheral pulses strong  MS: no gross musculoskeletal defects noted, no edema  SKIN: no suspicious lesions or rashes  NEURO: Normal strength and tone, mentation intact and speech normal  PSYCH: mentation appears normal, affect normal/bright      ASSESSMENT/PLAN:   (J01.90) Acute sinusitis with symptoms > 10 days  (primary encounter diagnosis)  Comment: Patient has greatly improved since her ER visit but continues to have sinus infection will treat today for sinus infection.  Plan: mometasone (NASONEX) 50 MCG/ACT nasal spray,         doxycycline hyclate (VIBRA-TABS) 100 MG tablet         CORINA Grande CNP  [facility]

## 2019-01-09 ENCOUNTER — HOSPITAL ENCOUNTER (OUTPATIENT)
Dept: MRI IMAGING | Facility: CLINIC | Age: 44
Discharge: HOME | End: 2019-01-09
Attending: NURSE PRACTITIONER | Admitting: NURSE PRACTITIONER
Payer: MEDICARE

## 2019-01-09 DIAGNOSIS — M25.372 INSTABILITY OF LEFT ANKLE JOINT: ICD-10-CM

## 2019-01-09 PROCEDURE — 73721 MRI JNT OF LWR EXTRE W/O DYE: CPT | Mod: LT

## 2019-01-22 ENCOUNTER — OFFICE VISIT (OUTPATIENT)
Dept: FAMILY MEDICINE | Facility: CLINIC | Age: 44
End: 2019-01-22
Payer: MEDICARE

## 2019-01-22 VITALS
DIASTOLIC BLOOD PRESSURE: 80 MMHG | HEIGHT: 65 IN | HEART RATE: 64 BPM | WEIGHT: 262 LBS | TEMPERATURE: 98.3 F | SYSTOLIC BLOOD PRESSURE: 130 MMHG | BODY MASS INDEX: 43.65 KG/M2

## 2019-01-22 DIAGNOSIS — Z01.818 PREOP GENERAL PHYSICAL EXAM: Primary | ICD-10-CM

## 2019-01-22 DIAGNOSIS — K21.00 GASTROESOPHAGEAL REFLUX DISEASE WITH ESOPHAGITIS: ICD-10-CM

## 2019-01-22 LAB
ALBUMIN SERPL-MCNC: 3.8 G/DL (ref 3.4–5)
ALP SERPL-CCNC: 79 U/L (ref 40–150)
ALT SERPL W P-5'-P-CCNC: 56 U/L (ref 0–50)
ANION GAP SERPL CALCULATED.3IONS-SCNC: 4 MMOL/L (ref 3–14)
AST SERPL W P-5'-P-CCNC: 17 U/L (ref 0–45)
BASOPHILS # BLD AUTO: 0 10E9/L (ref 0–0.2)
BASOPHILS NFR BLD AUTO: 0.4 %
BILIRUB SERPL-MCNC: 0.5 MG/DL (ref 0.2–1.3)
BUN SERPL-MCNC: 18 MG/DL (ref 7–30)
CALCIUM SERPL-MCNC: 8.9 MG/DL (ref 8.5–10.1)
CHLORIDE SERPL-SCNC: 102 MMOL/L (ref 94–109)
CO2 SERPL-SCNC: 28 MMOL/L (ref 20–32)
CREAT SERPL-MCNC: 0.95 MG/DL (ref 0.52–1.04)
DIFFERENTIAL METHOD BLD: ABNORMAL
EOSINOPHIL # BLD AUTO: 0.2 10E9/L (ref 0–0.7)
EOSINOPHIL NFR BLD AUTO: 3.9 %
ERYTHROCYTE [DISTWIDTH] IN BLOOD BY AUTOMATED COUNT: 19.7 % (ref 10–15)
GFR SERPL CREATININE-BSD FRML MDRD: 74 ML/MIN/{1.73_M2}
GLUCOSE SERPL-MCNC: 113 MG/DL (ref 70–99)
HCT VFR BLD AUTO: 36.1 % (ref 35–47)
HGB BLD-MCNC: 11.4 G/DL (ref 11.7–15.7)
LYMPHOCYTES # BLD AUTO: 1.8 10E9/L (ref 0.8–5.3)
LYMPHOCYTES NFR BLD AUTO: 35 %
MCH RBC QN AUTO: 29.1 PG (ref 26.5–33)
MCHC RBC AUTO-ENTMCNC: 31.6 G/DL (ref 31.5–36.5)
MCV RBC AUTO: 92 FL (ref 78–100)
MONOCYTES # BLD AUTO: 0.5 10E9/L (ref 0–1.3)
MONOCYTES NFR BLD AUTO: 10.5 %
NEUTROPHILS # BLD AUTO: 2.6 10E9/L (ref 1.6–8.3)
NEUTROPHILS NFR BLD AUTO: 50.2 %
PLATELET # BLD AUTO: 256 10E9/L (ref 150–450)
POTASSIUM SERPL-SCNC: 4.2 MMOL/L (ref 3.4–5.3)
PROT SERPL-MCNC: 7.8 G/DL (ref 6.8–8.8)
RBC # BLD AUTO: 3.92 10E12/L (ref 3.8–5.2)
SODIUM SERPL-SCNC: 134 MMOL/L (ref 133–144)
WBC # BLD AUTO: 5.2 10E9/L (ref 4–11)

## 2019-01-22 PROCEDURE — 80053 COMPREHEN METABOLIC PANEL: CPT | Performed by: NURSE PRACTITIONER

## 2019-01-22 PROCEDURE — 85025 COMPLETE CBC W/AUTO DIFF WBC: CPT | Performed by: NURSE PRACTITIONER

## 2019-01-22 PROCEDURE — 36415 COLL VENOUS BLD VENIPUNCTURE: CPT | Performed by: NURSE PRACTITIONER

## 2019-01-22 PROCEDURE — 99214 OFFICE O/P EST MOD 30 MIN: CPT | Performed by: NURSE PRACTITIONER

## 2019-01-22 ASSESSMENT — MIFFLIN-ST. JEOR: SCORE: 1849.3

## 2019-01-22 NOTE — PROGRESS NOTES
[facility]  [address] [place] [id number]  [phone]  Dept: [phone]    PRE-OP EVALUATION:  Today's date: 2019    Maris Wagner (: 1975) presents for pre-operative evaluation assessment as requested by Dr. Simon.  She requires evaluation and anesthesia risk assessment prior to undergoing surgery/procedure for treatment of left ankle arthroscopy .    Primary Physician: Judy Howard  Type of Anesthesia Anticipated: general with popliteal block    Patient has a Health Care Directive or Living Will:  NO    Preop Questions 2019   Who is doing your surgery? Dr Clint Simon   What are you having done? left ankle ligament tendon and hardware   Date of Surgery/Procedure: 2019   Facility or Hospital where procedure/surgery will be performed: [facility]    1.  Do you have a history of Heart attack, stroke, stent, coronary bypass surgery, or other heart surgery? YES Last surgery had aspiration Pneumonia    2.  Do you ever have any pain or discomfort in your chest? No   3.  Do you have a history of  Heart Failure? No   4.   Are you troubled by shortness of breath when:  walking on a level surface, or up a slight hill, or at night? No   5.  Do you currently have a cold, bronchitis or other respiratory infection? No   6.  Do you have a cough, shortness of breath, or wheezing? No   7.  Do you sometimes get pains in the calves of your legs when you walk? YES    8. Do you or anyone in your family have previous history of blood clots? No   9.  Do you or does anyone in your family have a serious bleeding problem such as prolonged bleeding following surgeries or cuts? No   10. Have you ever had problems with anemia or been told to take iron pills? YES has a history of  Anemia  not currently on medications    11. Have you had any abnormal blood loss such as black, tarry or bloody stools, or abnormal vaginal bleeding? No   12. Have you ever had a blood  transfusion? No   13. Have you or any of your relatives ever had problems with anesthesia? No   14. Do you have sleep apnea, excessive snoring or daytime drowsiness? YES - has sleep apnea    15. Do you have any prosthetic heart valves? No   16. Do you have prosthetic joints? No   17. Is there any chance that you may be pregnant? No         HPI:     HPI related to upcoming procedure:       See problem list for active medical problems.  Problems all longstanding and stable, except as noted/documented.  See ROS for pertinent symptoms related to these conditions.                                                                                                                                                          .    MEDICAL HISTORY:     Patient Active Problem List    Diagnosis Date Noted     Morbid obesity (H) 08/23/2018     Priority: Medium     Rheumatoid arthritis involving multiple sites with positive rheumatoid factor (H) 09/25/2017     Priority: Medium     Chronic back pain 04/12/2017     Priority: Medium     Recurrent herniation of lumbar disc 12/28/2016     Priority: Medium     S/P lumbar discectomy 07/18/2016     Priority: Medium     Anemia 02/02/2015     Priority: Medium     Irritable bowel syndrome 02/02/2015     Priority: Medium     Obesity 02/02/2015     Priority: Medium     Gastroesophageal reflux disease      Priority: Medium     Diagnosis updated by automated process. Provider to review and confirm.       Ileus, postoperative (H) 01/31/2015     Priority: Medium     Spinal stenosis of lumbar region 01/29/2015     Priority: Medium     Herniation of lumbar intervertebral disc without myelopathy 01/29/2015     Priority: Medium     CARDIOVASCULAR SCREENING; LDL GOAL LESS THAN 130 12/10/2012     Priority: Medium     Seasonal allergies 02/02/2015     Priority: Low      No past medical history on file.  Past Surgical History:   Procedure Laterality Date     ARTHROSCOPY ANKLE, OPEN REPAIR LIGAMENT, COMBINED  Right 8/3/2017    Procedure: COMBINED ARTHROSCOPY ANKLE, OPEN REPAIR LIGAMENT;  Right Ankle Arthroscopic Evaluation and Debridement,Lateral Ligament Repair,Fracture Evaluation, Open Reduction Internal Fixation ;  Surgeon: Clint Simon DPM;  Location: [facility]     OPEN REDUCTION INTERNAL FIXATION ANKLE Right 8/3/2017    Procedure: OPEN REDUCTION INTERNAL FIXATION ANKLE;;  Surgeon: Clint Simon DPM;  Location: [facility]     REMOVE FOREIGN BODY FINGER Left 3/28/2017    Procedure: REMOVE FOREIGN BODY FINGER;  Surgeon: Tabby Chan MD;  Location: [facility]     TUBAL LIGATION       Current Outpatient Medications   Medication Sig Dispense Refill     albuterol (PROAIR HFA/PROVENTIL HFA/VENTOLIN HFA) 108 (90 BASE) MCG/ACT Inhaler Inhale 2 puffs into the lungs every 6 hours       methotrexate sodium 50 MG/2ML SOLN Inject 0.8 mLs (20 mg) as directed every 7 days Office visit for further refills. 4 vial 5     mometasone (NASONEX) 50 MCG/ACT nasal spray Spray 2 sprays into both nostrils daily 1 g 0     Pantoprazole Sodium (PROTONIX PO) Take 40 mg by mouth daily        pregabalin (LYRICA) 75 MG capsule Take 100 mg by mouth 2 times daily        OTC products: no recent use of OTC ASA, NSAIDS or Steroids    Allergies   Allergen Reactions     Nka [No Known Allergies]       Latex Allergy: NO    Social History     Tobacco Use     Smoking status: Current Every Day Smoker     Packs/day: 0.50     Years: 25.00     Pack years: 12.50     Types: Cigarettes     Smokeless tobacco: Never Used   Substance Use Topics     Alcohol use: Yes     Comment: rare     History   Drug Use No       REVIEW OF SYSTEMS:   Constitutional, neuro, ENT, endocrine, pulmonary, cardiac, gastrointestinal, genitourinary, musculoskeletal, integument and psychiatric systems are negative, except as otherwise noted.    EXAM:   There were no vitals taken for this visit.    GENERAL APPEARANCE: healthy, alert and no distress     EYES: EOMI, PERRL     HENT: ear  canals and TM's normal and nose and mouth without ulcers or lesions     NECK: no adenopathy, no asymmetry, masses, or scars and thyroid normal to palpation     RESP: lungs clear to auscultation - no rales, rhonchi or wheezes     CV: regular rates and rhythm, normal S1 S2, no S3 or S4 and no murmur, click or rub     ABDOMEN:  soft, nontender, no HSM or masses and bowel sounds normal     MS: extremities normal- no gross deformities noted, no evidence of inflammation in joints, FROM in all extremities.     SKIN: no suspicious lesions or rashes     NEURO: Normal strength and tone, sensory exam grossly normal, mentation intact and speech normal     PSYCH: mentation appears normal. and affect normal/bright     LYMPHATICS: No cervical adenopathy    DIAGNOSTICS:     Results for orders placed or performed in visit on 01/22/19   Comprehensive metabolic panel   Result Value Ref Range    Sodium 134 133 - 144 mmol/L    Potassium 4.2 3.4 - 5.3 mmol/L    Chloride 102 94 - 109 mmol/L    Carbon Dioxide 28 20 - 32 mmol/L    Anion Gap 4 3 - 14 mmol/L    Glucose 113 (H) 70 - 99 mg/dL    Urea Nitrogen 18 7 - 30 mg/dL    Creatinine 0.95 0.52 - 1.04 mg/dL    GFR Estimate 74 >60 mL/min/[1.73_m2]    GFR Estimate If Black 86 >60 mL/min/[1.73_m2]    Calcium 8.9 8.5 - 10.1 mg/dL    Bilirubin Total 0.5 0.2 - 1.3 mg/dL    Albumin 3.8 3.4 - 5.0 g/dL    Protein Total 7.8 6.8 - 8.8 g/dL    Alkaline Phosphatase 79 40 - 150 U/L    ALT 56 (H) 0 - 50 U/L    AST 17 0 - 45 U/L   CBC with platelets differential   Result Value Ref Range    WBC 5.2 4.0 - 11.0 10e9/L    RBC Count 3.92 3.8 - 5.2 10e12/L    Hemoglobin 11.4 (L) 11.7 - 15.7 g/dL    Hematocrit 36.1 35.0 - 47.0 %    MCV 92 78 - 100 fl    MCH 29.1 26.5 - 33.0 pg    MCHC 31.6 31.5 - 36.5 g/dL    RDW 19.7 (H) 10.0 - 15.0 %    Platelet Count 256 150 - 450 10e9/L    % Neutrophils 50.2 %    % Lymphocytes 35.0 %    % Monocytes 10.5 %    % Eosinophils 3.9 %    % Basophils 0.4 %    Absolute Neutrophil 2.6  1.6 - 8.3 10e9/L    Absolute Lymphocytes 1.8 0.8 - 5.3 10e9/L    Absolute Monocytes 0.5 0.0 - 1.3 10e9/L    Absolute Eosinophils 0.2 0.0 - 0.7 10e9/L    Absolute Basophils 0.0 0.0 - 0.2 10e9/L    Diff Method Automated Method          Recent Labs   Lab Test 12/26/18  0946 08/23/18  1347 06/27/18  0020   HGB 11.7 12.0 10.9*    233 187     --  138   POTASSIUM 4.0  --  4.0   CR 0.81 0.78 0.88        IMPRESSION:   Reason for surgery/procedure: Left ankle ligament tendon and hardware    The proposed surgical procedure is considered INTERMEDIATE risk.    REVISED CARDIAC RISK INDEX  The patient has the following serious cardiovascular risks for perioperative complications such as (MI, PE, VFib and 3  AV Block):  No serious cardiac risks  INTERPRETATION: 0 risks: Class I (very low risk - 0.4% complication rate)    The patient has the following additional risks for perioperative complications:  No identified additional risks  Mild low hemoglobin will follow-up post surgery       ICD-10-CM    1. Preop general physical exam Z01.818        RECOMMENDATIONS:     --Consult hospital rounder / IM to assist post-op medical management    --Patient is to take all scheduled medications on the day of surgery EXCEPT for modifications listed below.    APPROVAL GIVEN to proceed with proposed procedure, without further diagnostic evaluation       Signed Electronically by: CORINA Grande CNP    Copy of this evaluation report is provided to requesting physician.    [facility] Preop Guidelines    Revised Cardiac Risk Index

## 2019-01-29 ENCOUNTER — ANESTHESIA EVENT (OUTPATIENT)
Dept: SURGERY | Facility: CLINIC | Age: 44
End: 2019-01-29
Payer: MEDICARE

## 2019-01-29 ASSESSMENT — LIFESTYLE VARIABLES: TOBACCO_USE: 1

## 2019-01-30 NOTE — ANESTHESIA PREPROCEDURE EVALUATION
Anesthesia Pre-Procedure Evaluation    Patient: Maris Wagner   MRN: [id number] : 1975          Preoperative Diagnosis: left ankle impingement and instability    Procedure(s):  Left Ankle ARTHROSCOPY,Osteochondral Defect Repair,Lateral Ligament Repair,and Peroneal Tendon Repair    No past medical history on file.  Past Surgical History:   Procedure Laterality Date     ARTHROSCOPY ANKLE, OPEN REPAIR LIGAMENT, COMBINED Right 8/3/2017    Procedure: COMBINED ARTHROSCOPY ANKLE, OPEN REPAIR LIGAMENT;  Right Ankle Arthroscopic Evaluation and Debridement,Lateral Ligament Repair,Fracture Evaluation, Open Reduction Internal Fixation ;  Surgeon: Clint Simon DPM;  Location: [facility]     OPEN REDUCTION INTERNAL FIXATION ANKLE Right 8/3/2017    Procedure: OPEN REDUCTION INTERNAL FIXATION ANKLE;;  Surgeon: Clint Simon DPM;  Location: [facility]     REMOVE FOREIGN BODY FINGER Left 3/28/2017    Procedure: REMOVE FOREIGN BODY FINGER;  Surgeon: Tabby Chan MD;  Location: [facility]     TUBAL LIGATION         Anesthesia Evaluation     . Pt has had prior anesthetic. Type: General and Regional           ROS/MED HX    ENT/Pulmonary:     (+)tobacco use, Current use , . Other pulmonary disease aspiration after last procedure.    Neurologic:  - neg neurologic ROS   (+)other neuro left food numbness    Cardiovascular:     (+) Dyslipidemia, ----. : . . . :. . Previous cardiac testing date:results:date: results:ECG reviewed date:18 results:Sinus Rhythm   WITHIN NORMAL LIMITS   date: results:          METS/Exercise Tolerance:     Hematologic:     (+) Anemia, -      Musculoskeletal:   (+) arthritis, , , other musculoskeletal- Spinal stenosis; S/P lumbar discectomy      GI/Hepatic:     (+) GERD Inflammatory bowel disease,       Renal/Genitourinary:         Endo:     (+) Obesity (morbid), .      Psychiatric:         Infectious Disease:         Malignancy:         Other:                          Physical Exam  Normal  "systems: cardiovascular, pulmonary and dental    Airway   Mallampati: II  TM distance: >3 FB  Neck ROM: full    Dental     Cardiovascular       Pulmonary             Lab Results   Component Value Date    WBC 5.2 01/22/2019    HGB 11.4 (L) 01/22/2019    HCT 36.1 01/22/2019     01/22/2019    CRP 4.2 08/23/2018    SED 29 (H) 08/23/2018     01/22/2019    POTASSIUM 4.2 01/22/2019    CHLORIDE 102 01/22/2019    CO2 28 01/22/2019    BUN 18 01/22/2019    CR 0.95 01/22/2019     (H) 01/22/2019    DEDE 8.9 01/22/2019    ALBUMIN 3.8 01/22/2019    PROTTOTAL 7.8 01/22/2019    ALT 56 (H) 01/22/2019    AST 17 01/22/2019    ALKPHOS 79 01/22/2019    BILITOTAL 0.5 01/22/2019    LIPASE 116 03/14/2015    TSH 2.78 02/02/2015    HCG Negative 03/14/2015       Preop Vitals  BP Readings from Last 3 Encounters:   01/22/19 130/80   01/02/19 130/70   12/26/18 129/87    Pulse Readings from Last 3 Encounters:   01/22/19 64   01/02/19 87   12/26/18 84      Resp Readings from Last 3 Encounters:   12/26/18 20   12/04/18 18   10/04/18 20    SpO2 Readings from Last 3 Encounters:   01/02/19 98%   12/26/18 98%   10/04/18 98%      Temp Readings from Last 1 Encounters:   01/22/19 36.8  C (98.3  F) (Tympanic)    Ht Readings from Last 1 Encounters:   01/22/19 1.651 m (5' 5\")      Wt Readings from Last 1 Encounters:   01/22/19 118.8 kg (262 lb)    Estimated body mass index is 43.6 kg/m  as calculated from the following:    Height as of 1/22/19: 1.651 m (5' 5\").    Weight as of 1/22/19: 118.8 kg (262 lb).       Anesthesia Plan      History & Physical Review  History and physical reviewed and following examination; no interval change.    ASA Status:  3 .    NPO Status:  > 6 hours    Plan for General, Periph. Nerve Block for postop pain and ETT with Intravenous induction. Maintenance will be Balanced.    PONV prophylaxis:  Ondansetron (or other 5HT-3) and Dexamethasone or Solumedrol  Additional equipment: Videolaryngoscope      Postoperative " Care  Postoperative pain management:  IV analgesics, Peripheral nerve block (Single Shot) and Multi-modal analgesia.      Consents  Anesthetic plan, risks, benefits and alternatives discussed with:  Patient..                 CORINA Conley CRNA

## 2019-01-31 ENCOUNTER — ANESTHESIA (OUTPATIENT)
Dept: SURGERY | Facility: CLINIC | Age: 44
End: 2019-01-31
Payer: MEDICARE

## 2019-01-31 ENCOUNTER — HOSPITAL ENCOUNTER (OUTPATIENT)
Facility: CLINIC | Age: 44
Discharge: HOME | End: 2019-01-31
Attending: PODIATRIST | Admitting: PODIATRIST
Payer: MEDICARE

## 2019-01-31 VITALS
BODY MASS INDEX: 43.65 KG/M2 | WEIGHT: 262 LBS | OXYGEN SATURATION: 94 % | SYSTOLIC BLOOD PRESSURE: 127 MMHG | HEART RATE: 66 BPM | TEMPERATURE: 98.6 F | DIASTOLIC BLOOD PRESSURE: 86 MMHG | RESPIRATION RATE: 14 BRPM | HEIGHT: 65 IN

## 2019-01-31 DIAGNOSIS — G89.18 POST-OP PAIN: Primary | ICD-10-CM

## 2019-01-31 PROCEDURE — C1762 CONN TISS, HUMAN(INC FASCIA): HCPCS | Performed by: PODIATRIST

## 2019-01-31 PROCEDURE — 37000008 ZZH ANESTHESIA TECHNICAL FEE, 1ST 30 MIN: Performed by: PODIATRIST

## 2019-01-31 PROCEDURE — 25000128 H RX IP 250 OP 636: Performed by: PODIATRIST

## 2019-01-31 PROCEDURE — 27210995 ZZH RX 272: Performed by: PODIATRIST

## 2019-01-31 PROCEDURE — 71000027 ZZH RECOVERY PHASE 2 EACH 15 MINS: Performed by: PODIATRIST

## 2019-01-31 PROCEDURE — 25000125 ZZHC RX 250: Performed by: NURSE ANESTHETIST, CERTIFIED REGISTERED

## 2019-01-31 PROCEDURE — 36000093 ZZH SURGERY LEVEL 4 1ST 30 MIN: Performed by: PODIATRIST

## 2019-01-31 PROCEDURE — 25800025 ZZH RX 258: Performed by: PODIATRIST

## 2019-01-31 PROCEDURE — 40000306 ZZH STATISTIC PRE PROC ASSESS II: Performed by: PODIATRIST

## 2019-01-31 PROCEDURE — A9270 NON-COVERED ITEM OR SERVICE: HCPCS | Mod: GY | Performed by: PODIATRIST

## 2019-01-31 PROCEDURE — 36000063 ZZH SURGERY LEVEL 4 EA 15 ADDTL MIN: Performed by: PODIATRIST

## 2019-01-31 PROCEDURE — 37000011 ZZH ANESTHESIA WARD SERVICE: Performed by: NURSE ANESTHETIST, CERTIFIED REGISTERED

## 2019-01-31 PROCEDURE — 25000128 H RX IP 250 OP 636: Performed by: NURSE ANESTHETIST, CERTIFIED REGISTERED

## 2019-01-31 PROCEDURE — 25000132 ZZH RX MED GY IP 250 OP 250 PS 637: Mod: GY | Performed by: PODIATRIST

## 2019-01-31 PROCEDURE — 37000009 ZZH ANESTHESIA TECHNICAL FEE, EACH ADDTL 15 MIN: Performed by: PODIATRIST

## 2019-01-31 PROCEDURE — 27210794 ZZH OR GENERAL SUPPLY STERILE: Performed by: PODIATRIST

## 2019-01-31 PROCEDURE — 25000132 ZZH RX MED GY IP 250 OP 250 PS 637: Mod: GY | Performed by: NURSE ANESTHETIST, CERTIFIED REGISTERED

## 2019-01-31 PROCEDURE — C1713 ANCHOR/SCREW BN/BN,TIS/BN: HCPCS | Performed by: PODIATRIST

## 2019-01-31 PROCEDURE — A9270 NON-COVERED ITEM OR SERVICE: HCPCS | Mod: GY | Performed by: NURSE ANESTHETIST, CERTIFIED REGISTERED

## 2019-01-31 PROCEDURE — 71000012 ZZH RECOVERY PHASE 1 LEVEL 1 FIRST HR: Performed by: PODIATRIST

## 2019-01-31 PROCEDURE — 71000013 ZZH RECOVERY PHASE 1 LEVEL 1 EA ADDTL HR: Performed by: PODIATRIST

## 2019-01-31 DEVICE — IMP ANCHOR ARTHREX BIO-SUTURE TAK 3X14MM W/FW AR-8934BCNF: Type: IMPLANTABLE DEVICE | Site: ANKLE | Status: FUNCTIONAL

## 2019-01-31 DEVICE — IMPLANTABLE DEVICE: Type: IMPLANTABLE DEVICE | Site: ANKLE | Status: FUNCTIONAL

## 2019-01-31 DEVICE — IMP KIT REPAIR LIGAMENT AUGMENTATION INT BRACE AR-1688-CP: Type: IMPLANTABLE DEVICE | Site: ANKLE | Status: FUNCTIONAL

## 2019-01-31 DEVICE — IMP ANCHOR ARTHREX BIO-SWIVELOCK 5.5MM AR-2323BCC: Type: IMPLANTABLE DEVICE | Site: ANKLE | Status: FUNCTIONAL

## 2019-01-31 DEVICE — IMP ANCHOR ARTHREX BIO-SWIVELOCK 4.75X19.1MM AR-2324BCC: Type: IMPLANTABLE DEVICE | Site: ANKLE | Status: FUNCTIONAL

## 2019-01-31 RX ORDER — LIDOCAINE HYDROCHLORIDE AND EPINEPHRINE BITARTRATE 20; .01 MG/ML; MG/ML
INJECTION, SOLUTION SUBCUTANEOUS PRN
Status: DISCONTINUED | OUTPATIENT
Start: 2019-01-31 | End: 2019-01-31

## 2019-01-31 RX ORDER — LIDOCAINE HYDROCHLORIDE 10 MG/ML
INJECTION, SOLUTION EPIDURAL; INFILTRATION; INTRACAUDAL; PERINEURAL PRN
Status: DISCONTINUED | OUTPATIENT
Start: 2019-01-31 | End: 2019-01-31

## 2019-01-31 RX ORDER — DEXAMETHASONE SODIUM PHOSPHATE 4 MG/ML
INJECTION, SOLUTION INTRA-ARTICULAR; INTRALESIONAL; INTRAMUSCULAR; INTRAVENOUS; SOFT TISSUE PRN
Status: DISCONTINUED | OUTPATIENT
Start: 2019-01-31 | End: 2019-01-31

## 2019-01-31 RX ORDER — NALOXONE HYDROCHLORIDE 0.4 MG/ML
.1-.4 INJECTION, SOLUTION INTRAMUSCULAR; INTRAVENOUS; SUBCUTANEOUS
Status: DISCONTINUED | OUTPATIENT
Start: 2019-01-31 | End: 2019-01-31 | Stop reason: HOSPADM

## 2019-01-31 RX ORDER — FENTANYL CITRATE 50 UG/ML
25-50 INJECTION, SOLUTION INTRAMUSCULAR; INTRAVENOUS
Status: CANCELLED | OUTPATIENT
Start: 2019-01-31

## 2019-01-31 RX ORDER — ONDANSETRON 2 MG/ML
4 INJECTION INTRAMUSCULAR; INTRAVENOUS EVERY 30 MIN PRN
Status: DISCONTINUED | OUTPATIENT
Start: 2019-01-31 | End: 2019-01-31 | Stop reason: HOSPADM

## 2019-01-31 RX ORDER — SODIUM CHLORIDE, SODIUM LACTATE, POTASSIUM CHLORIDE, CALCIUM CHLORIDE 600; 310; 30; 20 MG/100ML; MG/100ML; MG/100ML; MG/100ML
INJECTION, SOLUTION INTRAVENOUS CONTINUOUS
Status: DISCONTINUED | OUTPATIENT
Start: 2019-01-31 | End: 2019-01-31 | Stop reason: HOSPADM

## 2019-01-31 RX ORDER — DEXAMETHASONE SODIUM PHOSPHATE 10 MG/ML
INJECTION, SOLUTION INTRAMUSCULAR; INTRAVENOUS PRN
Status: DISCONTINUED | OUTPATIENT
Start: 2019-01-31 | End: 2019-01-31

## 2019-01-31 RX ORDER — OXYCODONE AND ACETAMINOPHEN 5; 325 MG/1; MG/1
1-2 TABLET ORAL EVERY 4 HOURS PRN
Qty: 26 TABLET | Refills: 0 | Status: SHIPPED | OUTPATIENT
Start: 2019-01-31 | End: 2019-07-01

## 2019-01-31 RX ORDER — ACETAMINOPHEN 325 MG/1
975 TABLET ORAL ONCE
Status: COMPLETED | OUTPATIENT
Start: 2019-01-31 | End: 2019-01-31

## 2019-01-31 RX ORDER — KETOROLAC TROMETHAMINE 30 MG/ML
30 INJECTION, SOLUTION INTRAMUSCULAR; INTRAVENOUS EVERY 6 HOURS PRN
Status: DISCONTINUED | OUTPATIENT
Start: 2019-01-31 | End: 2019-01-31 | Stop reason: HOSPADM

## 2019-01-31 RX ORDER — ONDANSETRON 4 MG/1
4 TABLET, ORALLY DISINTEGRATING ORAL EVERY 30 MIN PRN
Status: DISCONTINUED | OUTPATIENT
Start: 2019-01-31 | End: 2019-01-31 | Stop reason: HOSPADM

## 2019-01-31 RX ORDER — CEFAZOLIN SODIUM 2 G/100ML
2 INJECTION, SOLUTION INTRAVENOUS
Status: COMPLETED | OUTPATIENT
Start: 2019-01-31 | End: 2019-01-31

## 2019-01-31 RX ORDER — ALBUTEROL SULFATE 0.83 MG/ML
2.5 SOLUTION RESPIRATORY (INHALATION) EVERY 4 HOURS PRN
Status: DISCONTINUED | OUTPATIENT
Start: 2019-01-31 | End: 2019-01-31 | Stop reason: HOSPADM

## 2019-01-31 RX ORDER — GABAPENTIN 300 MG/1
300 CAPSULE ORAL ONCE
Status: COMPLETED | OUTPATIENT
Start: 2019-01-31 | End: 2019-01-31

## 2019-01-31 RX ORDER — HYDROMORPHONE HYDROCHLORIDE 1 MG/ML
.3-.5 INJECTION, SOLUTION INTRAMUSCULAR; INTRAVENOUS; SUBCUTANEOUS EVERY 10 MIN PRN
Status: DISCONTINUED | OUTPATIENT
Start: 2019-01-31 | End: 2019-01-31 | Stop reason: HOSPADM

## 2019-01-31 RX ORDER — MEPERIDINE HYDROCHLORIDE 25 MG/ML
12.5 INJECTION INTRAMUSCULAR; INTRAVENOUS; SUBCUTANEOUS
Status: DISCONTINUED | OUTPATIENT
Start: 2019-01-31 | End: 2019-01-31 | Stop reason: HOSPADM

## 2019-01-31 RX ORDER — FENTANYL CITRATE 50 UG/ML
25-50 INJECTION, SOLUTION INTRAMUSCULAR; INTRAVENOUS
Status: DISCONTINUED | OUTPATIENT
Start: 2019-01-31 | End: 2019-01-31 | Stop reason: HOSPADM

## 2019-01-31 RX ORDER — HYDROXYZINE PAMOATE 25 MG/1
25 CAPSULE ORAL 3 TIMES DAILY PRN
Qty: 26 CAPSULE | Refills: 0 | Status: SHIPPED | OUTPATIENT
Start: 2019-01-31 | End: 2019-07-01

## 2019-01-31 RX ORDER — ROPIVACAINE HYDROCHLORIDE 5 MG/ML
INJECTION, SOLUTION EPIDURAL; INFILTRATION; PERINEURAL PRN
Status: DISCONTINUED | OUTPATIENT
Start: 2019-01-31 | End: 2019-01-31

## 2019-01-31 RX ORDER — GLYCOPYRROLATE 0.2 MG/ML
INJECTION, SOLUTION INTRAMUSCULAR; INTRAVENOUS PRN
Status: DISCONTINUED | OUTPATIENT
Start: 2019-01-31 | End: 2019-01-31

## 2019-01-31 RX ORDER — FENTANYL CITRATE 50 UG/ML
INJECTION, SOLUTION INTRAMUSCULAR; INTRAVENOUS PRN
Status: DISCONTINUED | OUTPATIENT
Start: 2019-01-31 | End: 2019-01-31

## 2019-01-31 RX ORDER — ONDANSETRON 2 MG/ML
INJECTION INTRAMUSCULAR; INTRAVENOUS PRN
Status: DISCONTINUED | OUTPATIENT
Start: 2019-01-31 | End: 2019-01-31

## 2019-01-31 RX ORDER — CEFAZOLIN SODIUM 1 G/50ML
1 INJECTION, SOLUTION INTRAVENOUS SEE ADMIN INSTRUCTIONS
Status: DISCONTINUED | OUTPATIENT
Start: 2019-01-31 | End: 2019-01-31 | Stop reason: HOSPADM

## 2019-01-31 RX ORDER — CELECOXIB 200 MG/1
200 CAPSULE ORAL ONCE
Status: COMPLETED | OUTPATIENT
Start: 2019-01-31 | End: 2019-01-31

## 2019-01-31 RX ORDER — OXYCODONE AND ACETAMINOPHEN 5; 325 MG/1; MG/1
2 TABLET ORAL
Status: COMPLETED | OUTPATIENT
Start: 2019-01-31 | End: 2019-01-31

## 2019-01-31 RX ORDER — LIDOCAINE 40 MG/G
CREAM TOPICAL
Status: DISCONTINUED | OUTPATIENT
Start: 2019-01-31 | End: 2019-01-31 | Stop reason: HOSPADM

## 2019-01-31 RX ORDER — LIDOCAINE HYDROCHLORIDE 10 MG/ML
INJECTION, SOLUTION INFILTRATION; PERINEURAL PRN
Status: DISCONTINUED | OUTPATIENT
Start: 2019-01-31 | End: 2019-01-31

## 2019-01-31 RX ORDER — PROPOFOL 10 MG/ML
INJECTION, EMULSION INTRAVENOUS PRN
Status: DISCONTINUED | OUTPATIENT
Start: 2019-01-31 | End: 2019-01-31

## 2019-01-31 RX ADMIN — ACETAMINOPHEN 975 MG: 325 TABLET, FILM COATED ORAL at 12:41

## 2019-01-31 RX ADMIN — ROPIVACAINE HYDROCHLORIDE 30 ML: 5 INJECTION, SOLUTION EPIDURAL; INFILTRATION; PERINEURAL at 13:46

## 2019-01-31 RX ADMIN — DEXAMETHASONE SODIUM PHOSPHATE 1 MG: 10 INJECTION, SOLUTION INTRAMUSCULAR; INTRAVENOUS at 13:47

## 2019-01-31 RX ADMIN — CELECOXIB 200 MG: 200 CAPSULE ORAL at 12:42

## 2019-01-31 RX ADMIN — FENTANYL CITRATE 100 MCG: 50 INJECTION, SOLUTION INTRAMUSCULAR; INTRAVENOUS at 13:44

## 2019-01-31 RX ADMIN — DEXAMETHASONE SODIUM PHOSPHATE 4 MG: 4 INJECTION, SOLUTION INTRA-ARTICULAR; INTRALESIONAL; INTRAMUSCULAR; INTRAVENOUS; SOFT TISSUE at 14:12

## 2019-01-31 RX ADMIN — LIDOCAINE HYDROCHLORIDE 1 ML: 10 INJECTION, SOLUTION EPIDURAL; INFILTRATION; INTRACAUDAL; PERINEURAL at 12:41

## 2019-01-31 RX ADMIN — SODIUM CHLORIDE, POTASSIUM CHLORIDE, SODIUM LACTATE AND CALCIUM CHLORIDE: 600; 310; 30; 20 INJECTION, SOLUTION INTRAVENOUS at 13:43

## 2019-01-31 RX ADMIN — FENTANYL CITRATE 50 MCG: 50 INJECTION, SOLUTION INTRAMUSCULAR; INTRAVENOUS at 15:41

## 2019-01-31 RX ADMIN — FENTANYL CITRATE 50 MCG: 50 INJECTION, SOLUTION INTRAMUSCULAR; INTRAVENOUS at 17:26

## 2019-01-31 RX ADMIN — LIDOCAINE HYDROCHLORIDE 50 MG: 10 INJECTION, SOLUTION INFILTRATION; PERINEURAL at 14:07

## 2019-01-31 RX ADMIN — Medication 5 ML: at 13:45

## 2019-01-31 RX ADMIN — ONDANSETRON 4 MG: 2 INJECTION INTRAMUSCULAR; INTRAVENOUS at 14:12

## 2019-01-31 RX ADMIN — MIDAZOLAM 2 MG: 1 INJECTION INTRAMUSCULAR; INTRAVENOUS at 13:43

## 2019-01-31 RX ADMIN — GLYCOPYRROLATE 0.6 MG: 0.2 INJECTION, SOLUTION INTRAMUSCULAR; INTRAVENOUS at 14:28

## 2019-01-31 RX ADMIN — ROPIVACAINE HYDROCHLORIDE 10 ML: 5 INJECTION, SOLUTION EPIDURAL; INFILTRATION; PERINEURAL at 13:47

## 2019-01-31 RX ADMIN — FENTANYL CITRATE 50 MCG: 50 INJECTION, SOLUTION INTRAMUSCULAR; INTRAVENOUS at 15:39

## 2019-01-31 RX ADMIN — DEXAMETHASONE SODIUM PHOSPHATE 3 MG: 10 INJECTION, SOLUTION INTRAMUSCULAR; INTRAVENOUS at 13:46

## 2019-01-31 RX ADMIN — MIDAZOLAM 2 MG: 1 INJECTION INTRAMUSCULAR; INTRAVENOUS at 13:44

## 2019-01-31 RX ADMIN — SODIUM CHLORIDE, POTASSIUM CHLORIDE, SODIUM LACTATE AND CALCIUM CHLORIDE 1000 ML: 600; 310; 30; 20 INJECTION, SOLUTION INTRAVENOUS at 12:40

## 2019-01-31 RX ADMIN — FENTANYL CITRATE 50 MCG: 50 INJECTION, SOLUTION INTRAMUSCULAR; INTRAVENOUS at 17:20

## 2019-01-31 RX ADMIN — GABAPENTIN 300 MG: 300 CAPSULE ORAL at 12:41

## 2019-01-31 RX ADMIN — LIDOCAINE HYDROCHLORIDE 1 ML: 10 INJECTION, SOLUTION EPIDURAL; INFILTRATION; INTRACAUDAL; PERINEURAL at 13:45

## 2019-01-31 RX ADMIN — PROPOFOL 200 MG: 10 INJECTION, EMULSION INTRAVENOUS at 14:07

## 2019-01-31 RX ADMIN — CEFAZOLIN SODIUM 2 G: 2 INJECTION, SOLUTION INTRAVENOUS at 14:05

## 2019-01-31 RX ADMIN — OXYCODONE HYDROCHLORIDE AND ACETAMINOPHEN 2 TABLET: 5; 325 TABLET ORAL at 17:32

## 2019-01-31 ASSESSMENT — MIFFLIN-ST. JEOR: SCORE: 1849.3

## 2019-01-31 NOTE — ANESTHESIA CARE TRANSFER NOTE
Patient: Maris Wagner    Procedure(s):  Left Ankle Arthroscopy Evaluation & Debridement & Cartilage Repair & Ligament Revision Repair & Medial Ankle Ligament & Tendon Repair.    Diagnosis: left ankle impingement and instability  Diagnosis Additional Information: No value filed.    Anesthesia Type:   General, LMA, Periph. Nerve Block for postop pain     Note:  Airway :Nasal Cannula  Patient transferred to:PACU  Handoff Report: Identifed the Patient, Identified the Reponsible Provider, Reviewed the pertinent medical history, Discussed the surgical course, Reviewed Intra-OP anesthesia mangement and issues during anesthesia, Set expectations for post-procedure period and Allowed opportunity for questions and acknowledgement of understanding      Vitals: (Last set prior to Anesthesia Care Transfer)    CRNA VITALS  1/31/2019 1600 - 1/31/2019 1630      1/31/2019             EKG:  NSR                Electronically Signed By: Eleazar Millan CRNA, APRN CRNA  January 31, 2019  4:30 PM

## 2019-01-31 NOTE — ANESTHESIA POSTPROCEDURE EVALUATION
Patient: Maris Wagner    Procedure(s):  Left Ankle Arthroscopy Evaluation & Debridement & Cartilage Repair & Ligament Revision Repair & Medial Ankle Ligament & Tendon Repair.    Diagnosis:left ankle impingement and instability  Diagnosis Additional Information: No value filed.    Anesthesia Type:  General, LMA, Periph. Nerve Block for postop pain    Note:  Anesthesia Post Evaluation    Patient location during evaluation: Bedside  Patient participation: Able to fully participate in evaluation  Level of consciousness: awake and alert  Pain management: adequate  Airway patency: patent  Cardiovascular status: acceptable  Respiratory status: acceptable  Hydration status: acceptable  PONV: none     Anesthetic complications: None          Last vitals:  Vitals:    01/31/19 1631 01/31/19 1645 01/31/19 1700   BP: 141/84 143/70 (!) 141/91   Pulse:      Resp: 16 16 16   Temp: 37  C (98.6  F)     SpO2: 96% 96% 97%         Electronically Signed By: Eleazar Millan CRNA, APRN CRNA  January 31, 2019  5:21 PM

## 2019-01-31 NOTE — ANESTHESIA PROCEDURE NOTES
Peripheral nerve/Neuraxial procedure note : sciatic, saphenous and popliteal  Pre-Procedure  Performed by  Rosemarie Singleton APRN CRNA   Location: pre-op    Procedure Times:1/31/2019 1:44 PM and 1/31/2019 1:49 PM  Pre-Anesthestic Checklist: patient identified, IV checked, site marked, risks and benefits discussed, informed consent, monitors and equipment checked, pre-op evaluation, at physician/surgeon's request and post-op pain management    Timeout  Correct Patient: Yes   Correct Procedure: Yes   Correct Site: Yes   Correct Laterality: Yes   Correct Position: Yes   Site Marked: Yes   .   Procedure Documentation    Diagnosis:PAIN.    Procedure:  left  Sciatic, saphenous and popliteal.  Local skin infiltrated with 1 mL of 1% lidocaine.     Ultrasound used to identify targeted nerve, plexus, or vascular marker and placed a needle adjacent to it., Ultrasound was used to visualize the spread of the anesthetic in close proximity to the above stated nerve. A permanent image is entered into the patient's record.  Patient Prep;mask, sterile gloves, chlorhexidine gluconate and isopropyl alcohol, patient draped.  Nerve Stim: Initial Level 1 mA.  Lowest motor response 0.48 mA..  Needle: insulated, short bevel Needle Gauge: 20.    Needle Length (Inches) 4  Insertion Method: Single Shot.       Assessment/Narrative  Paresthesias: No.  Injection made incrementally with aspirations every 5 mL..  The placement was negative for: blood aspirated, painful injection and site bleeding.  Bolus given via needle. No blood aspirated via catheter.   Secured via.   Complications: none. Test dose of 5 mL lidocaine 2% w/ 1:200,000 epinephrine at 13:46.  Test dose negative for signs of intravascular, subdural or intrathecal injection. Comments:  Total volume injected at Sciatic nerve:30mL    Total volume injected at Saphenous Nerve:10mL

## 2019-01-31 NOTE — BRIEF OP NOTE
[facility] Operative Note    Pre-operative diagnosis: left ankle impingement and instability   Post-operative diagnosis * No post-op diagnosis entered *   Procedure: Procedure(s):  Left Ankle ARTHROSCOPY,Osteochondral Defect Repair,Lateral Ligament Repair,and Peroneal Tendon Repair   Surgeon: Clint Simon DPM   Anesthesia: Combined General with Popliteal Block    Estimated blood loss: Less than 10 ml   Blood transfusion: No transfusion was given during surgery   Drains: None   Specimens: None   Findings:  Left ankle with recurrent significant impingement tissues, acute and more chronic at this time.  Overall, cartilaginous surfaces are found to be reasonably intact.  Minor osseous distal tibial overhang was appreciated.  Instability about the lateral medial margins was appreciated with recurrent synovial inflammatory changes about the medial lateral collateral articular, capsular ligaments.  Adhesions of tissues as well as tendinopathy of both the peroneal tendons as well as the medial posterior tibial tendon about the malleolar margin was appreciated.   Complications: None   Condition: Stable   Comments: See dictated operative report for full details.           Clint Simon DPM, FACFAS  Foot & Ankle Surgeon/Specialist  [facility]

## 2019-01-31 NOTE — OP NOTE
[facility] OPERATIVE REPORT  Operative Report - Orthopedics  Maris Wagner,  1975, MRN [id number]    Surgery Date: 19    PCP: Judy Howard, [phone]   Code status:  No Order       OPERATION SITE:  [facility] Operating Room       OPERATIVE REPORT  DR. CLINT LOERA  FOOT & ANKLE SURGEON  [facility]    DATE OF PROCEDURE: 19    SITE: [facility] Operating Room    SURGEON: Dr. Clint Loera - [facility]    ASSISTANT: Hortencia Perez ROLA - [facility]      Pre-Operative Diagnosis:  1.  Left ankle recurrent instability, medial and lateral collateral ligaments  2.  Left ankle impingement  3.  Left posterior tibial tendinopathy  4.  Left peroneal tendinopathy      Post-Operative Diagnosis:  1.  Left ankle recurrent instability, medial and lateral collateral ligaments, rotational ankle instability condition  2.  Left ankle impingement  3.  Left posterior tibial tendinopathy  4.  Left peroneal tendinopathy    Procedures Performed:  1.  Left ankle arthroscopic evaluation and debridement, extensive  2.  Left ankle lateral collateral revision ligamental repair with internal augmentation  3.  Left ankle medial collateral revision ligament repair with internal augmentation  4.  Left peroneal tendon repair and tenodesis brevis and longus  5.  Left posterior tibial tendon repair and tenodesis, FHL transfer  6.  Osteochondral defect treatment via microfracture left ankle, central talar margin  7.  Posterior splint application below the knee ankle neutral, fiberglass material    Anesthesia: General anesthesia with airway as well as popliteal and saphenous blockade performed by anesthesia services under ultrasonic guidance  Hemostasis: Left-sided thigh tourniquet at 300 mmHg  EBL: < 10 mL  Findings: Left ankle arthroscopic evaluation revealed significant extensive scar tissue and adhesions with evidence of impingement and acute and chronic  synovial lesions.  Medial lateral collateral articular ligaments demonstrated instability with dynamic maneuvers in inversion, eversion anterior drawer.  Evidence of prior lateral ligament and tendon repair.  Confirmatory evidence of posterior tibial tendinopathy as well as peroneal tendinopathy primary of the peroneus brevis.  Implants: Arthrex three-point 0 suture tack anchors as well as Arthrex bio composite swivel lock anchors and collagen coated fiber tape within the internal brace augmentation set with additional anchors were utilized to complete the medial and lateral collateral revision surgery secondary to the rotational instability.  Specimens: None    Indications for the Operation:  The patient has been seen and evaluated in clinic for the above-mentioned diagnoses.  They have failed to respond to nonoperative care measures and/or surgical care for condition was indicated.  They have elected to proceed as recommended/indicated with surgical care after a thorough discussion of the associated pros, cons, risks and benefits of the operations as well as the postoperative course and details.  All associated questions were answered.  Verbal and written form informed consent was obtained.  Please see additional information within the clinical notes.    Description of the Procedure:  Patient was seen and evaluated in the preoperative holding area.  The surgical site was marked.  The consent was signed.  The H&P was updated/reviewed.  Patient was transported from the preoperative holding area to the surgical suite.  The patient was placed on the operative table.  Anesthesia was obtained.  Antibiotics were administered via IV.  Tourniquet was applied.  The operative extremity was prepped and draped sterilely.  Then, a timeout was performed to identify the proper patient, surgical site and the procedures to be performed.  Local anesthetic was infiltrated about the operative margins for regional blockade utilizing a  one-to-one mixture of 2% lidocaine plain and 0.5% Marcaine plain approximately 40 cc of the mixture was utilized.  The foot/ankle was exsanguinated, and the tourniquet was inflated.    Attention was then directed to the left ankle.  The anterior medial and anterior lateral portals were established via safe zones.  These were then utilized interchangeably as both viewing and working portals.  Prior to the portal establishment the ankle was insufflated with 15 cc of sterile saline and the external applied to counter the thigh marcano/crutch that was applied to the operative table.  Analysis of the ankle revealed significant chronic synovial impingement tissue, scar formation as well as acute and chronic evidence.  Cartilaginous surfaces were inspected and found to be with central osteochondral lesion with loose cartilage flap and underlying cystic change consistent with central talar MRI findings.  This was debrided via microfracture treatment with curettage and subchondral bone drilling with appropriately sized subchondral picks/microfracture awls..  Medial and lateral collateral ligaments with articular involvement found to be insufficient with dynamic inversion, eversion and anterior drawer examinations demonstrating articular evidence of rotational ankle instability.  Syndesmotic margin appeared to be stable.  Posterior lateral impingement synovial lesion was also appreciated.  Thorough ankle debridement was conducted as well as ablation in the attempt to prevent recurrent synovial impingement lesion.  Joint was thoroughly lavaged pre-and post arthroscopic debridement pictures were obtained and sent to the arthroscopic system and also to be sent to [facility].  The portals were reapproximated with 3-0 nylon portal stitch and the external ankle distractor as well as thigh marcano were removed.    Then directed to the lateral ankle where a curvilinear incision was made from the distal fibula more anteriorly.  Through this  10-12 cm incision layer dissection was conducted with adhesions that were released.  The peroneal sheath was opened up found to be with collective fluid as well as gross tendinopathy of primarily the peroneus brevis along with significant scarring and adhesions in this area making it difficult to discern the healthy tendons.  This extended to the extensor retinaculum, inferior posterior margin of the fibula.  The peroneal brevis was debrided via micro-tenotomy.  The peroneus longus was found to be in better shape without significant fraying or thickening or longitudinal tendinopathy.  Secondary to this and interwoven interlocking FiberWire stitch utilized to tenodesis the hernial brevis to the peroneus longus from the fibula to the lateral margin of the cuboid.  Additionally, this tendon tenodesis site was wrapped with a section of amnion tissue to enhance native healing in the setting of repeat degenerative tendinopathy.  Then, the underlying lateral collateral ligaments were inspected and found to be thickened with attenuation and compromise along with evidence of prior repair.  These were again dissected off the distal anterior and inferior fibular margins.  Partial decortication and anchor debridement was conducted here.  The lateral nonarticular margin of the talus was identified drilled for and a 4.75 bio swivel lock anchor was implanted.  This was then stranded back to the fibula.  A drill hole was placed here for the 3.5 swivel lock anchor.  Then, 2 three-point 0 suture tack anchors were placed about the native ATF and CF ligaments and oversewn with additional imbrication into the native anterior talofibular ligament incorporating extensor retinaculum into the native CF ligament incorporating a portion of the peroneal sheath.  These were then oversewn into the lateral periosteum for imbrication the modified Broström Goodman procedure with internal brace placed over the top and over tightened from the 4.75  anchor within the talus to the 3.5 bio swivel lock anchor within the tibia.  Adequate bone purchase and stability was appreciated on the operative table.  The peroneal tendons were found to be gliding safely.  After irrigation layered closure completed here with 2-0 and 3-0 Vicryl about the peroneal sheath, subcu and skin.    Attention was directed to the medial ankle of the left side.  Following old incision line in #15 blade was utilized to make a 8 cm incision but this then dissected down carefully in layers with neurovascular notification retraction.  The posterior tibial tendon sheath was opened up.  The posterior tibial tendon as was suspected on MRI was found to be of significant tendinopathy from the malleolus to the distal insertional elements, with associated significant adhesions and degenerative tendinopathy making normal tendon difficult to discern.  This was debrided and partially removed from its distal insertion.  The underlying deltoid ligament and spring ligament extension were expected found to be with attenuation a compromise and thickening indicating chronic infiltrative damage.  These were dissected off the inferior anterior and distal tibia but these were partially decorticated with a rasp and a Cecil.  Drill holes were placed for 2 three-point 0 suture tack anchors.  These were then oversewn into the native deep and superficial components of the anterior and central deltoid ligaments.  These were then imbricated back to the lateral periosteum to further stabilize the deltoid.  The adjacent FDL tendon was found to be compromised from prior tenodesis and secondarily the flexor hallucis longus was harvested as distally as possible and utilized in the form of Bio-Tenodesis about the insertional elements of the posterior tibial tendon and adjacent side-to-side tenodesis was also conducted of the FDL to the healthier FHL tendon.  To further augment the medial repair and internal augmentation was  conducted.  A 3.5 biological swivel lock anchor was imparted into the medial malleolus.  This is in stranded anteriorly to the navicular and down to the sustentaculum for additional augmentation of this medial revision deltoid ligament reconstruction procedure.  On the operative table much improved rotational ankle stability was appreciated with inversion stress, eversion stress and rotational anterior drawer stress along with rotational stress as patient was suffering from significant rotational instability of the bilateral collateral ankle ligaments, both medial and lateral.    Following this, thorough irrigation of the surgical sites was conducted.  Layered, anatomic closure completed with 2-0 Vicryl, 3-0 Vicryl and 3-0 nylon with careful apposition of the skin and surfaces for primary healing.  A compressive sterile splint/dressing was applied.  Vascular status was intact after deflation of the tourniquet.  SPLINT: A posterior fiberglass, below the knee splint was applied with the ankle in the neutral position.  COMPLICATIONS: No direct complications encountered throughout the case.    The patient tolerated the procedure & anesthesia well.  They were transported from the operative suite to the postoperative holding area.  The patient was given postoperative orders as well as specific postoperative instructions which were reviewed by the nursing staff.  Orders were placed for weightbearing status/activity, postoperative oral pain management, DVT prophylaxis measures both with mechanical and medicinal measures reviewed.  Splint/dressing care measures were reviewed as well as appropriate cryotherapy measures and nutrition.  Postoperative follow-up to be conducted in the next 10-14 days for outpatient clinical follow-up in the Orthopedic clinic at [facility].  If concerns or questions arise or develop they will contact our clinic and postoperative contact numbers provided.  Case details and  post-operative care requirements reviewed with family/support present today.  Additionally, a detailed postoperative instruction sheet was provided to the patient and family.  All additional questions were answered postoperatively.    Please note that this report was completed with the assistance of voice recognition and transcription services.  Although every effort has been made to correct and avoid errors, errors may remain.    Dr. Clint Simon, CELE, [facility]  Foot & Ankle Surgeon/Specialist  [facility]          CC: [facility], Dr. Simon's Clinical Team

## 2019-02-01 NOTE — DISCHARGE INSTRUCTIONS
Same Day Surgery Discharge Instructions  Special Precautions After Surgery - Adult    1. It is not unusual to feel lightheaded or faint, up to 24 hours after surgery or while taking pain medication.  If you have these symptoms; sit for a few minutes before standing and have someone assist you when getting up.  2. You should rest and relax for the next 24 hours and must have someone stay with you for at least 24 hours after your discharge.  3. DO NOT DRIVE any vehicle or operate mechanical equipment for 24 hours following the end of your surgery.  DO NOT DRIVE while taking narcotic pain medications that have been prescribed by your physician.  If you had a limb operated on, you must be able to use it fully to drive.  4. DO NOT drink alcoholic beverages for 24 hours following surgery or while taking prescription pain medication.  5. Drink clear liquids (apple juice, ginger ale, broth, 7-Up, etc.).  Progress to your regular diet as you feel able.  6. Any questions call your physician and do not make important decisions for 24 hours.    __________________________________________________________________________________________________________________________________  IMPORTANT NUMBERS:    [facility] Main Number:  [phone], [phone]  Pharmacy:  [phone]  Same Day Surgery:  [phone], Monday - Friday until 8:30 p.m.  Urgent Care:  [phone]  Emergency Room:  [phone]                                                                                  [facility]:  [phone]   --  Dr. Simon

## 2019-04-02 ENCOUNTER — HOSPITAL ENCOUNTER (OUTPATIENT)
Dept: PHYSICAL THERAPY | Facility: CLINIC | Age: 44
Setting detail: THERAPIES SERIES
End: 2019-04-02
Attending: PODIATRIST
Payer: MEDICARE

## 2019-04-02 PROCEDURE — 97110 THERAPEUTIC EXERCISES: CPT | Mod: GP | Performed by: PHYSICAL THERAPIST

## 2019-04-02 PROCEDURE — 97162 PT EVAL MOD COMPLEX 30 MIN: CPT | Mod: GP | Performed by: PHYSICAL THERAPIST

## 2019-04-02 NOTE — PROGRESS NOTES
Maris Wagner   PHYSICAL THERAPY EVALUATION    04/02/19 1100   General Information   Type of Visit Initial OP Ortho PT Evaluation   Start of Care Date 04/02/19   Referring Physician Dr. Simon   Patient/Family Goals Statement be able to walk, not need cam boot   Orders Evaluate and Treat   Date of Order 03/21/19   Insurance Type Medicare   Medical Diagnosis L ankle debridement, ligament repair   Surgical/Medical history reviewed Yes  (5 LB surgeries, 2 fusions, R ankle fx/ORIF)   Precautions/Limitations no known precautions/limitations   Body Part(s)   Body Part(s) Ankle/Foot   Presentation and Etiology   Pertinent history of current problem (include personal factors and/or comorbidities that impact the POC) Pt relates she had surgery on L ankle on 1/30/18. Pt relates she did fall in the shower shortly after and heard a pop.  Pt was NWB for approx 2 months and has been wearing a CAM boot for approx 1 month.Then had another surgery inJune 2018, per pt debridement again.  NOw had surgery 1/31/19, extensive repair work, debridement.  NWB about 6 wks, now walking in cam boot.     Onset date of current episode/exacerbation 01/31/19   Prior Level of Function   Functional Level Prior Comment does cook, clean, shops, drives   Current Level of Function   Patient role/employment history G. Disabled   Fall Risk Screen   Fall screen completed by PT   Have you fallen 2 or more times in the past year? No   Have you fallen and had an injury in the past year? No   Is patient a fall risk? No   Ankle/Foot Objective Findings   Side (if bilateral, select both right and left) Left   Observation L great toe rest in slight extension, even in WB   Integumentary mild edema L foot, ankle joint, incisions healing, medial scar more red in color, top of lateral scar still has one scab   Gait/Locomotion antalgic, decreased DF L   Left DF (Knee Ext) AROM -8*   Left PF AROM 35*   Left DF/Inversion Strength 3   Left DF/Eversion Strength 1+    Additional Left Ankle/Foot ROM Left DF (Knee Ext) PROM   Left Soleus (in WB) Flexibility 5*   Left DF (Knee Ext) PROM -3*   Left Calcaneal Inversion PROM 16*   Left Calcaneal Eversion PROM 8*   Planned Therapy Interventions   Planned Therapy Interventions balance training;joint mobilization;manual therapy;gait training;neuromuscular re-education;ROM;strengthening;stretching   Clinical Impression   Criteria for Skilled Therapeutic Interventions Met yes, treatment indicated   PT Diagnosis decreased strength and ROM s/p ankle ligament repair, debridement   Functional limitations due to impairments walking, stairs, balance   Clinical Presentation Evolving/Changing   Clinical Presentation Rationale Pt has multiple co-morbidiites affecting tx and relates she may have retore tendon, % LB surgies, L foot weakness, neuropathy, 3 ankle surgeries   Clinical Decision Making (Complexity) Moderate complexity   Therapy Frequency 1 time/week   Predicted Duration of Therapy Intervention (days/wks) 8 weeks   Risk & Benefits of therapy have been explained Yes   Patient, Family & other staff in agreement with plan of care Yes   Education Assessment   Preferred Learning Style Listening;Reading;Demonstration;Pictures/video   Barriers to Learning No barriers   ORTHO GOALS   PT Ortho Eval Goals 1;2;3   Ortho Goal 1   Goal Description Pt will be able to stand on either LE for 10+ secs to demonstrate improved balance and reduce risk of falls in 8wk   Target Date 06/02/19   Ortho Goal 2   Goal Description Pt will demonstrate 10+ deg of DF to be able to walk with normal gait mechanics   Target Date 06/02/19   Ortho Goal 3   Goal Description pt will be able to do reciprocol stairs in 8wk   Target Date 06/02/19   Total Evaluation Time   PT Eval, Moderate Complexity Minutes (10276) 20   Therapy Certification   Certification date from 04/02/19   Certification date to 06/02/19   Medical Diagnosis L ankle debridement, ligament repairs   Robert  Hoenk, PT #[id number]  [facility]

## 2019-04-02 NOTE — PROGRESS NOTES
[facility]          OUTPATIENT PHYSICAL THERAPY ORTHOPEDIC EVALUATION  PLAN OF TREATMENT FOR OUTPATIENT REHABILITATION  (COMPLETE FOR INITIAL CLAIMS ONLY)  Patient's Last Name, First Name, M.I.  YOB: 1975  KristyMaris    Provider s Name:  [facility]   Medical Record No.  [id number]   Start of Care Date:  04/02/19   Onset Date:  01/31/19   Type:     _X__PT   ___OT   ___SLP Medical Diagnosis:  L ankle debridement, ligament repairs     PT Diagnosis:  decreased strength and ROM s/p ankle ligament repair, debridement   Visits from SOC:  1      _________________________________________________________________________________  Plan of Treatment/Functional Goals:  balance training, joint mobilization, manual therapy, gait training, neuromuscular re-education, ROM, strengthening, stretching   Goals  Goal Description: Pt will be able to stand on either LE for 10+ secs to demonstrate improved balance and reduce risk of falls in 8wk  Target Date: 06/02/19    Goal Description: Pt will demonstrate 10+ deg of DF to be able to walk with normal gait mechanics  Target Date: 06/02/19    Goal Description: pt will be able to do reciprocol stairs in 8wk  Target Date: 06/02/19  =Therapy Frequency:  1 time/week  Predicted Duration of Therapy Intervention:  8 weeks    Kris Hoenk, PT                 I CERTIFY THE NEED FOR THESE SERVICES FURNISHED UNDER        THIS PLAN OF TREATMENT AND WHILE UNDER MY CARE .       Physician Signature               Date    X_____________________________________________________          Certification Date From:  04/02/19   Certification Date To:  06/02/19    Referring Provider:  Dr. Simon    Initial Assessment        See Epic Evaluation Start of Care Date: 04/02/19

## 2019-05-22 ENCOUNTER — TELEPHONE (OUTPATIENT)
Dept: RHEUMATOLOGY | Facility: CLINIC | Age: 44
End: 2019-05-22

## 2019-05-22 NOTE — TELEPHONE ENCOUNTER
Left message for patient about missing her appointment, asked her to get her labs done as soon as she can and to make a follow up appointment with Dr. Montana.  Angelic Bucio [facility]  5/22/2019 9:22 AM

## 2019-05-22 NOTE — TELEPHONE ENCOUNTER
Per Dr. Montana:   Call patient and let her know she missed her appointment this morning at 7:40 5/22/2019. Help reschedule appointment.  Patient needs to get labs done as soon as possible as she is over due, lab order is in.   Angelic Bucio CMA Rheumatology  5/22/2019 8:15 AM

## 2019-05-22 NOTE — TELEPHONE ENCOUNTER
Patient has not called back and is over due for lab appointment and no showed for her appointment. Methotrexate has been discontinued at the pharmacy until labs have been done for safety reasons.  Angelic Bucio CMA Rheumatology  5/22/2019 3:18 PM

## 2019-05-22 NOTE — LETTER
[facility]    [facility]   (Monday)  [address]  [place] [address]       [facility]   (Tuesday)  [address] [place] [address]    [facility]   (Wed., Thurs., and Friday)  [address] [place] [address]    Phone number: [phone]      Khanh Pollock,    You missed your appointment with Dr. Montana on 5/22/2019 and I left a message for you to call back and reschedule an appointment. We have not heard back from you yet so please call [phone] to make a follow up appointment with Dr. Montana. Also you are over due getting your labs done, please get these done as soon as you can, the orders are in and you may get these done at any [facility].     If you have any questions please let us know.    Thank you!  Dr. Fransisco Bucio, [facility]  5/28/2019 12:26 PM

## 2019-05-28 NOTE — PROGRESS NOTES
Discharge Note -Physical Therapy    NAME:  Maris Wagner  MRN:   [id number]    S:    Pt did not follow up for therapy as recommended.  Eval only on 4/2/19    O:  Objective information is not available as pt has not returned for therapy.  Last objective information or progress note will serve as final entry.    A:   Pt did not return for further treatment.    Status of goals is unknown due to lack of followup by patient.    P:  Discharge from PT this date.      Kris Hoenk, PT #[id number]  [facility]

## 2019-05-31 ENCOUNTER — APPOINTMENT (OUTPATIENT)
Dept: GENERAL RADIOLOGY | Facility: CLINIC | Age: 44
End: 2019-05-31
Attending: EMERGENCY MEDICINE
Payer: MEDICARE

## 2019-05-31 ENCOUNTER — HOSPITAL ENCOUNTER (EMERGENCY)
Facility: CLINIC | Age: 44
Discharge: HOME | End: 2019-05-31
Attending: EMERGENCY MEDICINE | Admitting: EMERGENCY MEDICINE
Payer: MEDICARE

## 2019-05-31 VITALS
TEMPERATURE: 98.6 F | RESPIRATION RATE: 16 BRPM | HEIGHT: 65 IN | OXYGEN SATURATION: 99 % | SYSTOLIC BLOOD PRESSURE: 142 MMHG | DIASTOLIC BLOOD PRESSURE: 88 MMHG | WEIGHT: 240 LBS | BODY MASS INDEX: 39.99 KG/M2

## 2019-05-31 DIAGNOSIS — M79.672 LEFT FOOT PAIN: ICD-10-CM

## 2019-05-31 DIAGNOSIS — M54.16 LUMBAR BACK PAIN WITH RADICULOPATHY AFFECTING RIGHT LOWER EXTREMITY: ICD-10-CM

## 2019-05-31 PROCEDURE — 72100 X-RAY EXAM L-S SPINE 2/3 VWS: CPT

## 2019-05-31 PROCEDURE — 99284 EMERGENCY DEPT VISIT MOD MDM: CPT | Mod: Z6 | Performed by: EMERGENCY MEDICINE

## 2019-05-31 PROCEDURE — 73630 X-RAY EXAM OF FOOT: CPT | Mod: LT

## 2019-05-31 PROCEDURE — 99284 EMERGENCY DEPT VISIT MOD MDM: CPT | Performed by: EMERGENCY MEDICINE

## 2019-05-31 RX ORDER — METHYLPREDNISOLONE 4 MG
TABLET, DOSE PACK ORAL
Qty: 21 TABLET | Refills: 0 | Status: SHIPPED | OUTPATIENT
Start: 2019-05-31 | End: 2019-05-31

## 2019-05-31 RX ORDER — OXYCODONE AND ACETAMINOPHEN 5; 325 MG/1; MG/1
1-2 TABLET ORAL EVERY 4 HOURS PRN
Qty: 6 TABLET | Refills: 0 | Status: SHIPPED | OUTPATIENT
Start: 2019-05-31 | End: 2019-07-01

## 2019-05-31 RX ORDER — METHYLPREDNISOLONE 4 MG
TABLET, DOSE PACK ORAL
Qty: 21 TABLET | Refills: 0 | Status: SHIPPED | OUTPATIENT
Start: 2019-05-31 | End: 2019-07-01

## 2019-05-31 ASSESSMENT — MIFFLIN-ST. JEOR: SCORE: 1749.51

## 2019-05-31 NOTE — DISCHARGE INSTRUCTIONS
Return if symptoms worsen or new symptoms develop.  Follow-up with primary care physician next available.  Drink plenty of fluids.  If any numbness weakness in lower extremity bowel or bladder dysfunction.  Or worsening pain occur please return for further evaluation and care.  Take pain medication as directed take steroids as directed.  He can also take ibuprofen 600 mg every 4 and then as needed.  Follow-up with your foot surgeon.

## 2019-05-31 NOTE — ED PROVIDER NOTES
"  History     Chief Complaint   Patient presents with     Foot Pain     had ankle surgery in january. 3 days ago noted increase swelling to left foot, feels like there is a ball or knot on sloe of foot. increased pain to foot     Back Pain     back pain for past few days, no injury     HPI  Maris Wagner is a 43 year old female with a history significant for spinal stenosis of the lumbar region status post lumbar discectomy, recurrent herniation of lumbar disc, chronic back pain, and rheumatoid arthritis who presents to the ED for evaluation of left foot pain and lower back pain. A few days ago, the patient reportedly began experiencing pain in the bottom of her left foot with walking. She states that it feels like there's \"a ball with prickles on it\" on the bottom of her left foot. She adds that she is unable to move her left foot well and has been walking on the side of her left foot since onset of pain. The patient has undergone three surgical procedures on her left foot, with the most recent in January 2019. She reports that she has neuropathy in her left foot and \"lost a lot of function\" after undergoing surgeries. The patient also states that she has experienced worsened lower back pain recently, which radiates down her right leg. She denies any trauma or falls and adds that \"usually when it does this I have to go have surgery again\".  The patient has a history of degenerative disc disease and has undergone two spinal fusions in her lower back in the past. Patient currently rates the pain a 4/10.      Allergies:  Allergies   Allergen Reactions     Nka [No Known Allergies]        Problem List:    Patient Active Problem List    Diagnosis Date Noted     Morbid obesity (H) 08/23/2018     Priority: Medium     Rheumatoid arthritis involving multiple sites with positive rheumatoid factor (H) 09/25/2017     Priority: Medium     Chronic back pain 04/12/2017     Priority: Medium     Recurrent herniation of lumbar disc " 12/28/2016     Priority: Medium     S/P lumbar discectomy 07/18/2016     Priority: Medium     Anemia 02/02/2015     Priority: Medium     Irritable bowel syndrome 02/02/2015     Priority: Medium     Obesity 02/02/2015     Priority: Medium     Gastroesophageal reflux disease      Priority: Medium     Diagnosis updated by automated process. Provider to review and confirm.       Ileus, postoperative (H) 01/31/2015     Priority: Medium     Spinal stenosis of lumbar region 01/29/2015     Priority: Medium     Herniation of lumbar intervertebral disc without myelopathy 01/29/2015     Priority: Medium     CARDIOVASCULAR SCREENING; LDL GOAL LESS THAN 130 12/10/2012     Priority: Medium     Seasonal allergies 02/02/2015     Priority: Low        Past Medical History:    No past medical history on file.    Past Surgical History:    Past Surgical History:   Procedure Laterality Date     ARTHROSCOPY ANKLE, OPEN REPAIR LIGAMENT, COMBINED Right 8/3/2017    Procedure: COMBINED ARTHROSCOPY ANKLE, OPEN REPAIR LIGAMENT;  Right Ankle Arthroscopic Evaluation and Debridement,Lateral Ligament Repair,Fracture Evaluation, Open Reduction Internal Fixation ;  Surgeon: Clint Simon DPM;  Location: [facility]     ARTHROSCOPY ANKLE, OPEN REPAIR LIGAMENT, COMBINED Left 1/31/2019    Procedure: Left Ankle Arthroscopy Evaluation & Debridement & Cartilage Repair & Ligament Revision Repair & Medial Ankle Ligament & Tendon Repair.;  Surgeon: Clint Simon DPM;  Location: [facility]     OPEN REDUCTION INTERNAL FIXATION ANKLE Right 8/3/2017    Procedure: OPEN REDUCTION INTERNAL FIXATION ANKLE;;  Surgeon: Clint Simon DPM;  Location: [facility]     REMOVE FOREIGN BODY FINGER Left 3/28/2017    Procedure: REMOVE FOREIGN BODY FINGER;  Surgeon: Tabby Chan MD;  Location: [facility]     TUBAL LIGATION         Family History:    Family History   Problem Relation Age of Onset     Diabetes Mother         pre diabetes     Arthritis Mother       "Hypertension Father      Cancer Maternal Grandmother      Cancer Maternal Grandfather      Cancer Paternal Grandmother      Cancer Paternal Grandfather         mesothelioma       Social History:  Marital Status:  Single [1]  Social History     Tobacco Use     Smoking status: Current Every Day Smoker     Packs/day: 0.50     Years: 25.00     Pack years: 12.50     Types: Cigarettes     Smokeless tobacco: Never Used   Substance Use Topics     Alcohol use: Yes     Comment: rare     Drug use: No        Medications:      albuterol (PROAIR HFA/PROVENTIL HFA/VENTOLIN HFA) 108 (90 BASE) MCG/ACT Inhaler   methotrexate sodium 50 MG/2ML SOLN   Pantoprazole Sodium (PROTONIX PO)   pregabalin (LYRICA) 75 MG capsule   ranitidine (ZANTAC) 150 MG capsule         Review of Systems  All systems reviewed and other than pertinent positives and negatives in HPI all other systems are negative.    Physical Exam   BP: 142/88  Heart Rate: 100  Temp: 98.6  F (37  C)  Resp: 16  Height: 165.1 cm (5' 5\")  Weight: 108.9 kg (240 lb)  SpO2: 99 %      Physical Exam    HENT: Oral mucosa moist. No lesions.  Neck: Supple  Pulmonary/Chest: normal respirations  Abdomen: Soft, non-distended, non-tender.   Musculoskeletal: Moving all extremities well. No peripheral edema. Back: Lower lumbar spine with positive midline tenderness. No erythema or edema. Left foot: Tenderness to palpation along the arch of the foot. No obvious masses or post surgical changes noted.no erythema edema present. Pulses and sensation are symmetrical.  Neurological: Alert. No focal neurologic deficit.   Skin: No rash.    ED Course        Procedures               Critical Care time:  none               Results for orders placed or performed during the hospital encounter of 05/31/19   Lumbar spine XR, 2-3 views    Narrative    XR LUMBAR SPINE 2-3 VIEWS 5/31/2019 1:45 PM    HISTORY: Low back and/or right-sided pain.    COMPARISON: 8/24/2060.      Impression    IMPRESSION: 3 views of the " lumbosacral spine show no acute osseous  finding. Extensive spinal fusion hardware is in place from L3 to S1.  There is no malalignment.     RADHA KING MD   Foot XR, G/E 3 views, left    Narrative    XR FOOT LT G/E 3 VW 5/31/2019 1:45 PM    HISTORY: Pain. No trauma.    COMPARISON: 12/4/2018.      Impression    IMPRESSION: 3 views of the left foot show a no acute osseous finding.  Mild degenerative change is seen in the first MTP joint as evidenced  by subchondral sclerosis and cyst formation involving the first  metatarsal head.     RADHA KING MD         Medications - No data to display       1:04 Patient assessed.      Assessments & Plan (with Medical Decision Making) x-rays of the lumbar spine and left foot were obtained.  X-ray of the lumbar spine revealed extensive hardware present but no acute change noted.  X-ray of the left foot revealed mild degenerative changes but no obvious focal abnormality.  Findings were discussed in detail with the patient.  I am going to give her a few pain pills and also start her on a Medrol Dosepak.  She should return if any bowel or bladder dysfunction or focal numbness weakness any extremity.  Patient is in agreement this plan.     I have reviewed the nursing notes.    I have reviewed the findings, diagnosis, plan and need for follow up with the patient.          Medication List      Started    methylPREDNISolone 4 MG tablet therapy pack  Commonly known as:  MEDROL DOSEPAK  Follow Package Directions     oxyCODONE-acetaminophen 5-325 MG tablet  Commonly known as:  PERCOCET  1-2 tablets, Oral, EVERY 4 HOURS PRN            Final diagnoses:   Lumbar back pain with radiculopathy affecting right lower extremity   Left foot pain       This document serves as a record of the services and decisions personally performed and made by Familia Davalos MD. It was created on HIS/HER behalf by Le Hickey, a trained medical scribe. The creation of this document is based the  provider's statements to the medical scribe.  Le Edelmira 1:17 PM 5/31/2019    Provider:   The information in this document, created by the medical scribe for me, accurately reflects the services I personally performed and the decisions made by me. I have reviewed and approved this document for accuracy prior to leaving the patient care area.  Familia Davalos MD 1:17 PM 5/31/2019 5/31/2019   [facility] EMERGENCY DEPARTMENT     Familia Davalos MD  06/03/19 2029

## 2019-05-31 NOTE — ED AVS SNAPSHOT
[facility]  [address] [facility] [address]  Phone:  [phone]  Fax:  [phone]                                    Maris Wagner   MRN: [id number]    Department:  [facility]   Date of Visit:  5/31/2019           After Visit Summary Signature Page    I have received my discharge instructions, and my questions have been answered. I have discussed any challenges I see with this plan with the nurse or doctor.    ..........................................................................................................................................  Patient/Patient Representative Signature      ..........................................................................................................................................  Patient Representative Print Name and Relationship to Patient    ..................................................               ................................................  Date                                   Time    ..........................................................................................................................................  Reviewed by Signature/Title    ...................................................              ..............................................  Date                                               Time          22EPIC Rev 08/18

## 2019-06-26 ENCOUNTER — NURSE TRIAGE (OUTPATIENT)
Dept: NURSING | Facility: CLINIC | Age: 44
End: 2019-06-26

## 2019-06-26 NOTE — TELEPHONE ENCOUNTER
"Reason for Call/Nurse Assessment:  43 y/o female with Rheumatoid Arthritisshe calls about waking in the middle of the night with severe RIGHT shoulder pain, unable to move it, she describes it as frozen, can't lift arm above head, can not lift arm at all. She has been using ice and NSAID's, had a recent Methotrexate injection, she follows Dr. Montana, her pain is 8 out of 10 if she tries to move it, can't do anything with the arm.       RN Action/Disposiiton:  RN reviewed protocols, advised patient that the recommendation is she be seen in the ER for evaluaiton, she will go now.    Roula Segal RN - [facility] Nurse Advisor  6/27/2019    4:35AM     Reason for Disposition    [1] SEVERE pain AND [2] not improved 2 hours after pain medicine    Additional Information    Negative: Passed out (i.e., lost consciousness, collapsed and was not responding)    Negative: Shock suspected (e.g., cold/pale/clammy skin, too weak to stand, low BP, rapid pulse)    Negative: [1] Similar pain previously AND [2] it was from \"heart attack\"    Negative: [1] Similar pain previously AND [2] it was from \"angina\" AND [3] not relieved by nitroglycerin    Negative: Sounds like a life-threatening emergency to the triager    Protocols used: SHOULDER PAIN-A-AH      "

## 2019-07-01 ENCOUNTER — ANCILLARY PROCEDURE (OUTPATIENT)
Dept: GENERAL RADIOLOGY | Facility: CLINIC | Age: 44
End: 2019-07-01
Attending: NURSE PRACTITIONER
Payer: MEDICARE

## 2019-07-01 ENCOUNTER — OFFICE VISIT (OUTPATIENT)
Dept: FAMILY MEDICINE | Facility: CLINIC | Age: 44
End: 2019-07-01
Payer: MEDICARE

## 2019-07-01 VITALS
TEMPERATURE: 98 F | BODY MASS INDEX: 44.15 KG/M2 | SYSTOLIC BLOOD PRESSURE: 112 MMHG | HEART RATE: 72 BPM | HEIGHT: 65 IN | WEIGHT: 265 LBS | DIASTOLIC BLOOD PRESSURE: 60 MMHG

## 2019-07-01 DIAGNOSIS — S46.911A STRAIN OF RIGHT SHOULDER, INITIAL ENCOUNTER: Primary | ICD-10-CM

## 2019-07-01 DIAGNOSIS — M05.79 RHEUMATOID ARTHRITIS INVOLVING MULTIPLE SITES WITH POSITIVE RHEUMATOID FACTOR (H): ICD-10-CM

## 2019-07-01 DIAGNOSIS — M54.12 CERVICAL RADICULOPATHY: ICD-10-CM

## 2019-07-01 DIAGNOSIS — S46.911A STRAIN OF RIGHT SHOULDER, INITIAL ENCOUNTER: ICD-10-CM

## 2019-07-01 DIAGNOSIS — E55.9 VITAMIN D DEFICIENCY: ICD-10-CM

## 2019-07-01 DIAGNOSIS — S96.912A STRAIN OF LEFT ANKLE, INITIAL ENCOUNTER: ICD-10-CM

## 2019-07-01 LAB
ALBUMIN SERPL-MCNC: 3.1 G/DL (ref 3.4–5)
ALP SERPL-CCNC: 73 U/L (ref 40–150)
ALT SERPL W P-5'-P-CCNC: 16 U/L (ref 0–50)
AST SERPL W P-5'-P-CCNC: 7 U/L (ref 0–45)
BASOPHILS # BLD AUTO: 0 10E9/L (ref 0–0.2)
BASOPHILS NFR BLD AUTO: 0.4 %
BILIRUB DIRECT SERPL-MCNC: <0.1 MG/DL (ref 0–0.2)
BILIRUB SERPL-MCNC: 0.2 MG/DL (ref 0.2–1.3)
CREAT SERPL-MCNC: 0.81 MG/DL (ref 0.52–1.04)
CRP SERPL-MCNC: 6 MG/L (ref 0–8)
DIFFERENTIAL METHOD BLD: ABNORMAL
EOSINOPHIL # BLD AUTO: 0.2 10E9/L (ref 0–0.7)
EOSINOPHIL NFR BLD AUTO: 3 %
ERYTHROCYTE [DISTWIDTH] IN BLOOD BY AUTOMATED COUNT: 18.7 % (ref 10–15)
ERYTHROCYTE [SEDIMENTATION RATE] IN BLOOD BY WESTERGREN METHOD: 26 MM/H (ref 0–20)
GFR SERPL CREATININE-BSD FRML MDRD: 88 ML/MIN/{1.73_M2}
HCT VFR BLD AUTO: 36.5 % (ref 35–47)
HGB BLD-MCNC: 11.5 G/DL (ref 11.7–15.7)
LYMPHOCYTES # BLD AUTO: 1.7 10E9/L (ref 0.8–5.3)
LYMPHOCYTES NFR BLD AUTO: 31.8 %
MCH RBC QN AUTO: 28.3 PG (ref 26.5–33)
MCHC RBC AUTO-ENTMCNC: 31.5 G/DL (ref 31.5–36.5)
MCV RBC AUTO: 90 FL (ref 78–100)
MONOCYTES # BLD AUTO: 0.4 10E9/L (ref 0–1.3)
MONOCYTES NFR BLD AUTO: 7.9 %
NEUTROPHILS # BLD AUTO: 3.1 10E9/L (ref 1.6–8.3)
NEUTROPHILS NFR BLD AUTO: 56.9 %
PLATELET # BLD AUTO: 233 10E9/L (ref 150–450)
PROT SERPL-MCNC: 6.6 G/DL (ref 6.8–8.8)
RBC # BLD AUTO: 4.06 10E12/L (ref 3.8–5.2)
WBC # BLD AUTO: 5.4 10E9/L (ref 4–11)

## 2019-07-01 PROCEDURE — 80076 HEPATIC FUNCTION PANEL: CPT | Performed by: INTERNAL MEDICINE

## 2019-07-01 PROCEDURE — 85652 RBC SED RATE AUTOMATED: CPT | Performed by: INTERNAL MEDICINE

## 2019-07-01 PROCEDURE — 85025 COMPLETE CBC W/AUTO DIFF WBC: CPT | Performed by: INTERNAL MEDICINE

## 2019-07-01 PROCEDURE — 99214 OFFICE O/P EST MOD 30 MIN: CPT | Performed by: NURSE PRACTITIONER

## 2019-07-01 PROCEDURE — 82306 VITAMIN D 25 HYDROXY: CPT | Performed by: INTERNAL MEDICINE

## 2019-07-01 PROCEDURE — 73030 X-RAY EXAM OF SHOULDER: CPT | Mod: RT

## 2019-07-01 PROCEDURE — 36415 COLL VENOUS BLD VENIPUNCTURE: CPT | Performed by: INTERNAL MEDICINE

## 2019-07-01 PROCEDURE — 82565 ASSAY OF CREATININE: CPT | Performed by: INTERNAL MEDICINE

## 2019-07-01 PROCEDURE — 86140 C-REACTIVE PROTEIN: CPT | Performed by: INTERNAL MEDICINE

## 2019-07-01 ASSESSMENT — MIFFLIN-ST. JEOR: SCORE: 1857.91

## 2019-07-01 NOTE — PROGRESS NOTES
Subjective     Maris Wagner is a 44 year old female who presents to clinic today for the following health issues:    HPI     Shoulder Pain    Onset: 4-5 days    Description:   Location: right shoulder  Character: pinching    Intensity: moderate    Progression of Symptoms: worse    Accompanying Signs & Symptoms:  Other symptoms: numbness in right hand    History:   Previous similar pain: YES      Precipitating factors:   Trauma or overuse: no     Alleviating factors:  Improved by: nothing    Therapies Tried and outcome: tylenol    Joint Pain    Onset: six months    Description:   Location: left ankle  Character: Sharp    Intensity: moderate    Progression of Symptoms: worse    Accompanying Signs & Symptoms:  Other symptoms: swelling    History:   Previous similar pain: YES      Precipitating factors:   Trauma or overuse: YES- rolled her ankle a couple weeks ago    Alleviating factors:  Improved by: nothing    Therapies Tried and outcome: tylenol         Patient Active Problem List   Diagnosis     CARDIOVASCULAR SCREENING; LDL GOAL LESS THAN 130     Ileus, postoperative (H)     Spinal stenosis of lumbar region     Anemia     Gastroesophageal reflux disease     Irritable bowel syndrome     Seasonal allergies     Obesity     S/P lumbar discectomy     Chronic back pain     Recurrent herniation of lumbar disc     Herniation of lumbar intervertebral disc without myelopathy     Rheumatoid arthritis involving multiple sites with positive rheumatoid factor (H)     Morbid obesity (H)     Past Surgical History:   Procedure Laterality Date     ARTHROSCOPY ANKLE, OPEN REPAIR LIGAMENT, COMBINED Right 8/3/2017    Procedure: COMBINED ARTHROSCOPY ANKLE, OPEN REPAIR LIGAMENT;  Right Ankle Arthroscopic Evaluation and Debridement,Lateral Ligament Repair,Fracture Evaluation, Open Reduction Internal Fixation ;  Surgeon: Clint Simon DPM;  Location: [facility]     ARTHROSCOPY ANKLE, OPEN REPAIR LIGAMENT, COMBINED Left 1/31/2019     Procedure: Left Ankle Arthroscopy Evaluation & Debridement & Cartilage Repair & Ligament Revision Repair & Medial Ankle Ligament & Tendon Repair.;  Surgeon: Clint Simon DPM;  Location: [facility]     OPEN REDUCTION INTERNAL FIXATION ANKLE Right 8/3/2017    Procedure: OPEN REDUCTION INTERNAL FIXATION ANKLE;;  Surgeon: Clint Simon DPM;  Location: [facility]     REMOVE FOREIGN BODY FINGER Left 3/28/2017    Procedure: REMOVE FOREIGN BODY FINGER;  Surgeon: Tabby Chan MD;  Location: [facility]     TUBAL LIGATION         Social History     Tobacco Use     Smoking status: Current Every Day Smoker     Packs/day: 0.50     Years: 25.00     Pack years: 12.50     Types: Cigarettes     Smokeless tobacco: Never Used   Substance Use Topics     Alcohol use: Yes     Comment: rare     Family History   Problem Relation Age of Onset     Diabetes Mother         pre diabetes     Arthritis Mother      Hypertension Father      Cancer Maternal Grandmother      Cancer Maternal Grandfather      Cancer Paternal Grandmother      Cancer Paternal Grandfather         mesothelioma         Current Outpatient Medications   Medication Sig Dispense Refill     albuterol (PROAIR HFA/PROVENTIL HFA/VENTOLIN HFA) 108 (90 BASE) MCG/ACT Inhaler Inhale 2 puffs into the lungs every 6 hours       methotrexate sodium 50 MG/2ML SOLN Inject 0.8 mLs (20 mg) as directed every 7 days Office visit for further refills. (Patient taking differently: Inject 20 mg as directed every 7 days On Monday (Office visit for further refills.)) 4 vial 5     ranitidine (ZANTAC) 150 MG capsule Take 1 capsule (150 mg) by mouth 2 times daily 180 capsule 3     Allergies   Allergen Reactions     Nka [No Known Allergies]      Recent Labs   Lab Test 01/22/19  1050 12/26/18  0946 08/23/18  1347 06/27/18  0020  02/02/15  0635   ALT 56*  --  33 14   < >  --    CR 0.95 0.81 0.78 0.88   < >  --    GFRESTIMATED 74 89 81 70   < >  --    GFRESTBLACK 86 >90 >90 85   < >  --    POTASSIUM  "4.2 4.0  --  4.0   < >  --    TSH  --   --   --   --   --  2.78    < > = values in this interval not displayed.      BP Readings from Last 3 Encounters:   07/01/19 112/60   05/31/19 142/88   01/31/19 127/86    Wt Readings from Last 3 Encounters:   07/01/19 120.2 kg (265 lb)   05/31/19 108.9 kg (240 lb)   01/31/19 118.8 kg (262 lb)             Reviewed and updated as needed this visit by Provider         Review of Systems   ROS COMP: Constitutional, HEENT, cardiovascular, pulmonary, gi and gu systems are negative, except as otherwise noted.      Objective    /60 (BP Location: Right arm, Cuff Size: Adult Large)   Pulse 72   Temp 98  F (36.7  C) (Tympanic)   Ht 1.651 m (5' 5\")   Wt 120.2 kg (265 lb)   BMI 44.10 kg/m    Body mass index is 44.1 kg/m .  Physical Exam   GENERAL: healthy, alert and no distress  EYES: Eyes grossly normal to inspection, PERRL and conjunctivae and sclerae normal  HENT: ear canals and TM's normal, nose and mouth without ulcers or lesions  NECK: no adenopathy, no asymmetry, masses, or scars and thyroid normal to palpation  RESP: lungs clear to auscultation - no rales, rhonchi or wheezes  BREAST: normal without masses, tenderness or nipple discharge and no palpable axillary masses or adenopathy  CV: regular rate and rhythm, normal S1 S2, no S3 or S4, no murmur, click or rub, no peripheral edema and peripheral pulses strong  ABDOMEN: soft, nontender, no hepatosplenomegaly, no masses and bowel sounds normal  MS: no gross musculoskeletal defects noted, no edema  SKIN: no suspicious lesions or rashes  NEURO: Normal strength and tone, mentation intact and speech normal  PSYCH: mentation appears normal, affect normal/bright     Inspection: normal cervical lordosis  Tender:  left paracervical muscles, right paracervical muscles  Non-tender:  occipital nerves, spinous processes, scapula, medial border of scapula, superior angle of scapula, normal musculature, left trapezius muscles, right " "trapezius muscles  Range of Motion:  Full ROM of cervical spine  Strength: Full strength of all neck muscles  Special tests:  Reproduction of radicular pattern        Inspection: no swelling, bruising, discoloration, or obvious deformity or asymmetry  Tender: acromion, anterior capsule and proximal bicep tendon  Non-tender: mid-distal clavicle, AC joint, upper trapezius muscle and rhomboids  Range of Motion  Active:limited due to pain.  Passive: limited due to pain.  Strength: rotator cuff strength full  Special tests:  Positive: Neers  Negative: cross arm adduction, anterior apprehension, apprehension relocation and posterior apprehension    XR SHOULDER RT G/E 3 VW   7/1/2019 11:49 AM      HISTORY:  Strain of right shoulder, initial encounter                                                                      IMPRESSION:  Negative exam.     VICTOR M ROBIN MD  Assessment & Plan     (S46.911A) Strain of right shoulder, initial encounter  (primary encounter diagnosis)  Comment: Patient has new injury to shoulder shoulder x-ray within normal limits recommend physical therapy  Plan: XR Shoulder Right G/E 3 Views, PHYSICAL THERAPY        REFERRAL      (S96.912A) Strain of left ankle, initial encounter  Comment: Left ankle is healing patient still has issues with standing on it long-term we will have her start with some work restrictions  Plan: order for DME    (M54.12) Cervical radiculopathy  Comment: Concern for cervical Radiculopathy versus shoulder strain we will have patient start physical therapy  Plan: PHYSICAL THERAPY REFERRAL         Tobacco Cessation:   reports that she has been smoking cigarettes.  She has a 12.50 pack-year smoking history. She has never used smokeless tobacco.  Tobacco Cessation Action Plan: Information offered: Patient not interested at this time      BMI:   Estimated body mass index is 44.1 kg/m  as calculated from the following:    Height as of this encounter: 1.651 m (5' 5\").    Weight " as of this encounter: 120.2 kg (265 lb).   Weight management plan: Discussed healthy diet and exercise guidelines        See Patient Instructions    No follow-ups on file.    CORINA Grande CNP  [facility]

## 2019-07-01 NOTE — PATIENT INSTRUCTIONS
Ankle:  Can wear compression soaks, brace and start physical therapy.    Shoulder:     Will have you start physical therapy and steroid burst      Patient Education     Understanding Cervical Radiculopathy    Cervical radiculopathy is irritation or inflammation of a nerve root in the neck. It causes neck pain and other symptoms that may spread into the chest or down the arm. To understand this condition, it helps to understand the parts of the spine:    Vertebrae. These are bones that stack to form the spine. The cervical spine contains the 7 vertebrae in the neck.    Disks. These are soft pads of tissue between the vertebrae. They act as shock absorbers for the spine.    The spinal canal. This is a tunnel formed within the stacked vertebrae. The spinal cord runs through this canal.    Nerves. These branch off the spinal cord. As they leave the spinal canal, nerves pass through openings between the vertebrae. The nerve root is the part of the nerve that is closest to the spinal cord.   With cervical radiculopathy, nerve roots in the neck become irritated. This leads to pain and symptoms that can travel to the nerves that go from the spinal cord down the arms and into the torso.  What causes cervical radiculopathy?  Aging, injury, poor posture, and other issues can lead to problems in the neck. These problems may then irritate nerve roots. These include:    Damage to a disk in the cervical spine. The damaged disk may then press on nearby nerve roots.    Degeneration from wear and tear, and aging. This can lead to narrowing (stenosis) of the openings between the vertebrae. The narrowed openings press on nerve roots as they leave the spinal canal.    An unstable spine. This is when a vertebra slips forward. It can then press on a nerve root.  There are other, less common causes of pressure on nerves in the neck. These include infection, cysts, and tumors.  Symptoms of cervical radiculopathy  These include:    Neck  pain    Pain, numbness, tingling, or weakness that travels down the arm    Loss of neck movement    Muscle spasms  Treatment for cervical radiculopathy  In most cases, your healthcare provider will first try treatments that help relieve symptoms. These may include:    Prescription or over-the-counter pain medicines. These help relieve pain and swelling.    Cold packs. These help reduce pain.    Resting. This involves avoiding positions and activities that increase pain.    Neck brace (cervical collar). This can help relieve inflammation and pain.    Physical therapy, including exercises and stretches. This can help decrease pain and increase movement and function.    Shots of medicinesaround the nerve roots. This is done to help relieve symptoms for a time.  In some cases, your healthcare provider may advise surgery to fix the underlying problem. This depends on the cause, the symptoms, and how long the pain has lasted.  Possible complications  Over time, an irritated and inflamed nerve may become damaged. This may lead to long-lasting (permanent) numbness or weakness. If symptoms change suddenly or get worse, be sure to let your healthcare provider know.     When to call your healthcare provider  Call your healthcare provider right away if you have any of these:    New pain or pain that gets worse    New or increasing weakness, numbness, or tingling in your arm or hand    Bowel or bladder changes   Date Last Reviewed: 3/10/2016    1872-0765 [employer]. [address] [place] [address], [place] [address]. All rights reserved. This information is not intended as a substitute for professional medical care. Always follow your healthcare professional's instructions.           Patient Education     Shoulder Sprain  A sprain is a stretching or tearing of the ligaments that hold a joint together. A sprain may take up to 8 weeks to fully heal, depending on how severe it is. Moderate to severe shoulder sprains are  treated with a sling or shoulder immobilizer. Minor sprains can be treated without any special support.  Home care  The following guidelines will help you care for your injury at home:    If a sling was given to you, leave it in place for the time advised by your healthcare provider. If you aren t sure how long to wear it, ask for advice. If the sling becomes loose, adjust it so that your forearm is level with the ground. Your shoulder should feel well supported.    Put an ice pack on the injured area for 20 minutes every 1 to 2 hours the first day. You can make your own ice pack by putting ice cubes in a plastic bag. A bag of frozen peas or something similar works well too. Wrap the bag in a thin towel. Continue with ice packs 3 to 4 times a day for the next 2 to 3 days. Then use the pack as needed to ease pain and swelling.    You may use acetaminophen or ibuprofen to control pain, unless another pain medicine was prescribed. If you have chronic liver or kidney disease, talk with your healthcare provider before using these medicines. Also talk with your provider if you ve had a stomach ulcer or gastrointestinal bleeding.    Shoulder joints become stiff if left in a sling for too long. You should start range of motion exercises about 7 to 10 days after the injury. Talk with your provider to find out what type of exercises to do and how soon to start.  Follow-up care  Follow up with your healthcare provider, or as advised.  Any X-rays you had today don t show any broken bones, breaks, or fractures. Sometimes fractures don t show up on the first X-ray. Bruises and sprains can sometimes hurt as much as a fracture. These injuries can take time to heal completely. If your symptoms don t improve or they get worse, talk with your provider. You may need a repeat X-ray or other treatments.  When to seek medical advice  Call your healthcare provider right away if any of these occur:    Shoulder pain or swelling in your arm  that gets worse    Fingers become cold, blue, numb, or tingly    Large amount of bruising of the shoulder or upper arm    Fever or chills  Date Last Reviewed: 8/1/2016 2000-2018 [employer]. [address] [place] [address], [place] [address]. All rights reserved. This information is not intended as a substitute for professional medical care. Always follow your healthcare professional's instructions.

## 2019-07-02 LAB — DEPRECATED CALCIDIOL+CALCIFEROL SERPL-MC: 20 UG/L (ref 20–75)

## 2019-07-03 ENCOUNTER — TELEPHONE (OUTPATIENT)
Dept: RHEUMATOLOGY | Facility: CLINIC | Age: 44
End: 2019-07-03

## 2019-07-03 DIAGNOSIS — M05.79 RHEUMATOID ARTHRITIS INVOLVING MULTIPLE SITES WITH POSITIVE RHEUMATOID FACTOR (H): ICD-10-CM

## 2019-07-03 NOTE — TELEPHONE ENCOUNTER
Attempted to contact Pt, l/m having Pt return call to clinic @ [phone] re: labs do not show evidence for medication toxicity.  Vitamin D is low at 20.  Therefore, methotrexate has been refilled.  Future refills of methotrexate to be done at a clinic visit. Telephone encounter started 07/03/19.     Lillian Portillo CMA  7/3/2019  9:24 AM

## 2019-07-03 NOTE — TELEPHONE ENCOUNTER
[facility] for pt to call back.    Hortencia Porter  07/03/19 1:45pm

## 2019-07-03 NOTE — TELEPHONE ENCOUNTER
----- Message from Fransisco Montana MD sent at 7/3/2019  1:20 AM CDT -----  Rheumatology team: Please call to inform Ms. Wagner that her labs do not show evidence for medication toxicity.  Vitamin D is low at 20.  Therefore, methotrexate has been refilled.  Future refills of methotrexate to be done at a clinic visit.    Fransisco Montana MD  7/3/2019 1:17 AM

## 2019-07-03 NOTE — RESULT ENCOUNTER NOTE
Rheumatology team: Please call to inform Ms. Wagner that her labs do not show evidence for medication toxicity.  Vitamin D is low at 20.  Therefore, methotrexate has been refilled.  Future refills of methotrexate to be done at a clinic visit.    Fransisco Montana MD  7/3/2019 1:17 AM

## 2019-07-05 NOTE — TELEPHONE ENCOUNTER
Left message for patient to call back to rheumatology. Also sent patient a NPMhart message.  Angelic Bucio CMA Rheumatology  7/5/2019 11:11 AM

## 2019-08-15 ENCOUNTER — HOSPITAL ENCOUNTER (EMERGENCY)
Facility: CLINIC | Age: 44
Discharge: HOME | End: 2019-08-15
Attending: NURSE PRACTITIONER | Admitting: NURSE PRACTITIONER
Payer: MEDICARE

## 2019-08-15 VITALS
BODY MASS INDEX: 39.94 KG/M2 | TEMPERATURE: 98 F | WEIGHT: 240 LBS | OXYGEN SATURATION: 99 % | SYSTOLIC BLOOD PRESSURE: 131 MMHG | DIASTOLIC BLOOD PRESSURE: 87 MMHG | RESPIRATION RATE: 16 BRPM

## 2019-08-15 DIAGNOSIS — M06.9 FLARE OF RHEUMATOID ARTHRITIS (H): ICD-10-CM

## 2019-08-15 PROCEDURE — 25000128 H RX IP 250 OP 636: Performed by: NURSE PRACTITIONER

## 2019-08-15 PROCEDURE — 96374 THER/PROPH/DIAG INJ IV PUSH: CPT

## 2019-08-15 PROCEDURE — 99213 OFFICE O/P EST LOW 20 MIN: CPT | Mod: Z6 | Performed by: NURSE PRACTITIONER

## 2019-08-15 PROCEDURE — G0463 HOSPITAL OUTPT CLINIC VISIT: HCPCS | Mod: 25

## 2019-08-15 RX ORDER — IBUPROFEN 800 MG/1
800 TABLET, FILM COATED ORAL EVERY 8 HOURS PRN
Qty: 60 TABLET | Refills: 0 | Status: SHIPPED | OUTPATIENT
Start: 2019-08-15 | End: 2019-08-23

## 2019-08-15 RX ORDER — PREDNISONE 20 MG/1
TABLET ORAL
Qty: 5 TABLET | Refills: 0 | Status: SHIPPED | OUTPATIENT
Start: 2019-08-15 | End: 2019-08-22

## 2019-08-15 RX ORDER — KETOROLAC TROMETHAMINE 30 MG/ML
30 INJECTION, SOLUTION INTRAMUSCULAR; INTRAVENOUS ONCE
Status: COMPLETED | OUTPATIENT
Start: 2019-08-15 | End: 2019-08-15

## 2019-08-15 RX ADMIN — KETOROLAC TROMETHAMINE 30 MG: 30 INJECTION, SOLUTION INTRAMUSCULAR at 15:45

## 2019-08-15 ASSESSMENT — ENCOUNTER SYMPTOMS
BACK PAIN: 0
JOINT SWELLING: 0
COUGH: 0
MYALGIAS: 1
LIGHT-HEADEDNESS: 0
NUMBNESS: 0
HEADACHES: 0
NECK STIFFNESS: 0
SHORTNESS OF BREATH: 0
DIZZINESS: 0
COLOR CHANGE: 0
ARTHRALGIAS: 1
NAUSEA: 0
FATIGUE: 0
ABDOMINAL PAIN: 0
FEVER: 0
WEAKNESS: 0
CHILLS: 0
NECK PAIN: 0

## 2019-08-15 NOTE — ED PROVIDER NOTES
History     Chief Complaint   Patient presents with     Joint Swelling     right elbow pain since yesterday no injury     HPI  Maris Wagner is a 44 year old female who presents to the urgent care for evaluation of right inner arm/elbow pain since yesterday. Patient states that yesterday she began having left forearm pain which has since been decreasing and now is feeling slightly 'heavy'. Today patient's left arm/elbow pain now feels like a ripping and aching pain rated 8/10 with no associated injury.  Patient has a history of rheumatoid arthritis and gets weekly methotrexate injections, last injection 3 days ago. Has tried ice and heat application. Patient states she occasionally gets these flare-ups in between treatments but has been happening less. Denies fever, erythema, edema, tingling, headache, myalgia and rash.    Allergies:  Allergies   Allergen Reactions     Nka [No Known Allergies]        Problem List:    Patient Active Problem List    Diagnosis Date Noted     Morbid obesity (H) 08/23/2018     Priority: Medium     Rheumatoid arthritis involving multiple sites with positive rheumatoid factor (H) 09/25/2017     Priority: Medium     Chronic back pain 04/12/2017     Priority: Medium     Recurrent herniation of lumbar disc 12/28/2016     Priority: Medium     S/P lumbar discectomy 07/18/2016     Priority: Medium     Anemia 02/02/2015     Priority: Medium     Irritable bowel syndrome 02/02/2015     Priority: Medium     Obesity 02/02/2015     Priority: Medium     Gastroesophageal reflux disease      Priority: Medium     Diagnosis updated by automated process. Provider to review and confirm.       Ileus, postoperative (H) 01/31/2015     Priority: Medium     Spinal stenosis of lumbar region 01/29/2015     Priority: Medium     Herniation of lumbar intervertebral disc without myelopathy 01/29/2015     Priority: Medium     CARDIOVASCULAR SCREENING; LDL GOAL LESS THAN 130 12/10/2012     Priority: Medium     Seasonal  allergies 02/02/2015     Priority: Low        Past Medical History:    No past medical history on file.    Past Surgical History:    Past Surgical History:   Procedure Laterality Date     ARTHROSCOPY ANKLE, OPEN REPAIR LIGAMENT, COMBINED Right 8/3/2017    Procedure: COMBINED ARTHROSCOPY ANKLE, OPEN REPAIR LIGAMENT;  Right Ankle Arthroscopic Evaluation and Debridement,Lateral Ligament Repair,Fracture Evaluation, Open Reduction Internal Fixation ;  Surgeon: Clint Simon DPM;  Location: [facility]     ARTHROSCOPY ANKLE, OPEN REPAIR LIGAMENT, COMBINED Left 1/31/2019    Procedure: Left Ankle Arthroscopy Evaluation & Debridement & Cartilage Repair & Ligament Revision Repair & Medial Ankle Ligament & Tendon Repair.;  Surgeon: Clint Simon DPM;  Location: [facility]     OPEN REDUCTION INTERNAL FIXATION ANKLE Right 8/3/2017    Procedure: OPEN REDUCTION INTERNAL FIXATION ANKLE;;  Surgeon: Clint Simon DPM;  Location: [facility]     REMOVE FOREIGN BODY FINGER Left 3/28/2017    Procedure: REMOVE FOREIGN BODY FINGER;  Surgeon: Tabby Chan MD;  Location: [facility] OR     TUBAL LIGATION         Family History:    Family History   Problem Relation Age of Onset     Diabetes Mother         pre diabetes     Arthritis Mother      Hypertension Father      Cancer Maternal Grandmother      Cancer Maternal Grandfather      Cancer Paternal Grandmother      Cancer Paternal Grandfather         mesothelioma       Social History:  Marital Status:  Single [1]  Social History     Tobacco Use     Smoking status: Current Every Day Smoker     Packs/day: 0.50     Years: 25.00     Pack years: 12.50     Types: Cigarettes     Smokeless tobacco: Never Used   Substance Use Topics     Alcohol use: Yes     Comment: rare     Drug use: No        Medications:      ibuprofen (ADVIL/MOTRIN) 800 MG tablet   predniSONE (DELTASONE) 20 MG tablet   albuterol (PROAIR HFA/PROVENTIL HFA/VENTOLIN HFA) 108 (90 BASE) MCG/ACT Inhaler   methotrexate sodium 50  MG/2ML SOLN   order for DME   ranitidine (ZANTAC) 150 MG capsule         Review of Systems   Constitutional: Negative for chills, fatigue and fever.   Respiratory: Negative for cough and shortness of breath.    Cardiovascular: Negative for chest pain.   Gastrointestinal: Negative for abdominal pain and nausea.   Musculoskeletal: Positive for arthralgias and myalgias. Negative for back pain, gait problem, joint swelling, neck pain and neck stiffness.   Skin: Negative for color change, pallor and rash.   Neurological: Negative for dizziness, weakness, light-headedness, numbness and headaches.       Physical Exam   BP: 131/87  Heart Rate: 100  Temp: 98  F (36.7  C)  Resp: 16  Weight: 108.9 kg (240 lb)  SpO2: 99 %      Physical Exam   Constitutional: She is oriented to person, place, and time. She appears well-developed and well-nourished. No distress.   Neck: Normal range of motion. Neck supple.   Cardiovascular: Normal rate and regular rhythm.   Pulmonary/Chest: Effort normal and breath sounds normal. No respiratory distress.   Musculoskeletal:        Right elbow: She exhibits decreased range of motion (secondary to pain). She exhibits no swelling, no effusion, no deformity and no laceration. No tenderness found.   Neurological: She is alert and oriented to person, place, and time.   Skin: Skin is warm. Capillary refill takes less than 2 seconds. She is not diaphoretic.       ED Course        Procedures       No results found for this or any previous visit (from the past 24 hour(s)).    Medications   ketorolac (TORADOL) injection 30 mg (30 mg Intravenous Given 8/15/19 0355)       Assessments & Plan (with Medical Decision Making)   Patient is a 44 year old female who presents to the urgent care for evaluation of a likely RA flare up. Called patient's rheumatologist to discuss patient recommendations but did not hear back. Plan for ibuprofen and prednisone at home, provided Toradol injection prior to discharge with some  improvement in pain. Return precautions reviewed, all questions answered. Patient with planned follow up on Monday and will return sooner if needed. No indication of septic joint or need for xray at this time. Patient discharged in stable condition.   I have reviewed the nursing notes.    I have reviewed the findings, diagnosis, plan and need for follow up with the patient.      Discharge Medication List as of 8/15/2019  4:04 PM      START taking these medications    Details   ibuprofen (ADVIL/MOTRIN) 800 MG tablet Take 1 tablet (800 mg) by mouth every 8 hours as needed for moderate pain, Disp-60 tablet, R-0, E-Prescribe      predniSONE (DELTASONE) 20 MG tablet 1 tab daily for 3 days, then 1/2 tab daily for 4 days, Disp-5 tablet, R-0, E-Prescribe             Final diagnoses:   Flare of rheumatoid arthritis (H)       8/15/2019   [facility] EMERGENCY DEPARTMENT     Imelda Gutierrez, CORINA CNP  08/15/19 5097

## 2019-08-15 NOTE — ED AVS SNAPSHOT
[facility] Emergency Department  [address] [address]  Phone:  [phone]  Fax:  [phone]                                    Maris Wagner   MRN: [id number]    Department:  [facility] Emergency Department   Date of Visit:  8/15/2019           After Visit Summary Signature Page    I have received my discharge instructions, and my questions have been answered. I have discussed any challenges I see with this plan with the nurse or doctor.    ..........................................................................................................................................  Patient/Patient Representative Signature      ..........................................................................................................................................  Patient Representative Print Name and Relationship to Patient    ..................................................               ................................................  Date                                   Time    ..........................................................................................................................................  Reviewed by Signature/Title    ...................................................              ..............................................  Date                                               Time          22EPIC Rev 08/18

## 2020-06-04 ENCOUNTER — HOSPITAL ENCOUNTER (EMERGENCY)
Facility: CLINIC | Age: 45
Discharge: HOME | End: 2020-06-04
Attending: EMERGENCY MEDICINE | Admitting: EMERGENCY MEDICINE
Payer: MEDICARE

## 2020-06-04 VITALS
RESPIRATION RATE: 16 BRPM | DIASTOLIC BLOOD PRESSURE: 89 MMHG | SYSTOLIC BLOOD PRESSURE: 131 MMHG | OXYGEN SATURATION: 99 % | TEMPERATURE: 98.2 F

## 2020-06-04 DIAGNOSIS — S30.861A TICK BITE OF GROIN, INITIAL ENCOUNTER: ICD-10-CM
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
## 2020-06-04 DIAGNOSIS — S91.125A: ICD-10-CM

## 2020-06-04 PROCEDURE — 12001 RPR S/N/AX/GEN/TRNK 2.5CM/<: CPT | Mod: Z6 | Performed by: EMERGENCY MEDICINE

## 2020-06-04 PROCEDURE — 99283 EMERGENCY DEPT VISIT LOW MDM: CPT | Performed by: EMERGENCY MEDICINE

## 2020-06-04 PROCEDURE — 12001 RPR S/N/AX/GEN/TRNK 2.5CM/<: CPT | Performed by: EMERGENCY MEDICINE

## 2020-06-04 PROCEDURE — 99284 EMERGENCY DEPT VISIT MOD MDM: CPT | Mod: 25 | Performed by: EMERGENCY MEDICINE

## 2020-06-04 NOTE — ED PROVIDER NOTES
History     Chief Complaint   Patient presents with     Tick Removal     HPI  Maris Wagner is a 44 year old female who presents for evaluation of a tick bite to the left lower abdominal wall/groin area after she removed a enlarged/engorged tick.  She is worried that there might be some retained tick within the bite site and states that there was a ring around this area yesterday, now resolved today.  In addition she reports a laceration to the base of the plantar aspect of the left little toe, but she is unclear how this was incurred.  Sudden onset of pain in the area with note of a laceration here approximately 3 weeks ago.  She does not recall stepping on something sharp or a mechanism of injury.  It continues to be painful and will not close, she has not previously sought care for this.  She denies sensation of retained foreign body.  I reviewed her EMR and her tetanus immunization status is current are up-to-date.    Allergies:  Allergies   Allergen Reactions     Nka [No Known Allergies]        Problem List:    Patient Active Problem List    Diagnosis Date Noted     Morbid obesity (H) 08/23/2018     Priority: Medium     Rheumatoid arthritis involving multiple sites with positive rheumatoid factor (H) 09/25/2017     Priority: Medium     Chronic back pain 04/12/2017     Priority: Medium     Recurrent herniation of lumbar disc 12/28/2016     Priority: Medium     S/P lumbar discectomy 07/18/2016     Priority: Medium     Anemia 02/02/2015     Priority: Medium     Irritable bowel syndrome 02/02/2015     Priority: Medium     Obesity 02/02/2015     Priority: Medium     Gastroesophageal reflux disease      Priority: Medium     Diagnosis updated by automated process. Provider to review and confirm.       Ileus, postoperative (H) 01/31/2015     Priority: Medium     Spinal stenosis of lumbar region 01/29/2015     Priority: Medium     Herniation of lumbar intervertebral disc without myelopathy 01/29/2015     Priority:  Medium     CARDIOVASCULAR SCREENING; LDL GOAL LESS THAN 130 12/10/2012     Priority: Medium     Seasonal allergies 02/02/2015     Priority: Low        Past Medical History:    No past medical history on file.    Past Surgical History:    Past Surgical History:   Procedure Laterality Date     ARTHROSCOPY ANKLE, OPEN REPAIR LIGAMENT, COMBINED Right 8/3/2017    Procedure: COMBINED ARTHROSCOPY ANKLE, OPEN REPAIR LIGAMENT;  Right Ankle Arthroscopic Evaluation and Debridement,Lateral Ligament Repair,Fracture Evaluation, Open Reduction Internal Fixation ;  Surgeon: Clint Simon DPM;  Location: [facility]     ARTHROSCOPY ANKLE, OPEN REPAIR LIGAMENT, COMBINED Left 1/31/2019    Procedure: Left Ankle Arthroscopy Evaluation & Debridement & Cartilage Repair & Ligament Revision Repair & Medial Ankle Ligament & Tendon Repair.;  Surgeon: Clint Simon DPM;  Location: [facility]     OPEN REDUCTION INTERNAL FIXATION ANKLE Right 8/3/2017    Procedure: OPEN REDUCTION INTERNAL FIXATION ANKLE;;  Surgeon: Clint Simon DPM;  Location: [facility]     REMOVE FOREIGN BODY FINGER Left 3/28/2017    Procedure: REMOVE FOREIGN BODY FINGER;  Surgeon: Tabby Chan MD;  Location: [facility]     TUBAL LIGATION         Family History:    Family History   Problem Relation Age of Onset     Diabetes Mother         pre diabetes     Arthritis Mother      Hypertension Father      Cancer Maternal Grandmother      Cancer Maternal Grandfather      Cancer Paternal Grandmother      Cancer Paternal Grandfather         mesothelioma       Social History:  Marital Status:  Single [1]  Social History     Tobacco Use     Smoking status: Current Every Day Smoker     Packs/day: 0.50     Years: 25.00     Pack years: 12.50     Types: Cigarettes     Smokeless tobacco: Never Used   Substance Use Topics     Alcohol use: Yes     Comment: rare     Drug use: No        Medications:    amoxicillin-clavulanate (AUGMENTIN) 875-125 MG tablet  albuterol (PROAIR  HFA/PROVENTIL HFA/VENTOLIN HFA) 108 (90 BASE) MCG/ACT Inhaler  methotrexate sodium 50 MG/2ML SOLN  order for DME          Review of Systems  As mentioned above in the history present illness.  All other systems were reviewed and are negative.    Physical Exam   BP: 131/89  Heart Rate: 100  Temp: 98.2  F (36.8  C)  Resp: 16  SpO2: 99 %      Physical Exam  Vitals signs and nursing note reviewed.   Constitutional:       General: She is not in acute distress.     Appearance: Normal appearance. She is well-developed. She is not ill-appearing or diaphoretic.   HENT:      Head: Normocephalic and atraumatic.      Right Ear: External ear normal.      Left Ear: External ear normal.      Nose: Nose normal.   Eyes:      General: No scleral icterus.     Extraocular Movements: Extraocular movements intact.      Conjunctiva/sclera: Conjunctivae normal.   Neck:      Musculoskeletal: Normal range of motion and neck supple.      Trachea: No tracheal deviation.   Cardiovascular:      Rate and Rhythm: Normal rate and regular rhythm.      Pulses: Normal pulses.      Heart sounds: Normal heart sounds.   Pulmonary:      Effort: Pulmonary effort is normal. No respiratory distress.      Breath sounds: Normal breath sounds.   Abdominal:      General: There is no distension.      Palpations: Abdomen is soft.      Tenderness: There is no abdominal tenderness.   Musculoskeletal: Normal range of motion.      Right lower leg: No edema.      Left lower leg: No edema.        Feet:    Skin:     General: Skin is warm and dry.      Coloration: Skin is not pale.      Findings: Lesion ( Left lower abdominal wall/groin tick bite site with localized erythema of less than 0.5 cm and no discernible foreign body/remaining tick parts in the base. ) present. No erythema or rash.          Neurological:      General: No focal deficit present.      Mental Status: She is alert and oriented to person, place, and time.      Coordination: Coordination normal.    Psychiatric:         Mood and Affect: Mood normal.         Behavior: Behavior normal.         ED Course        [facility]    -Laceration Repair    Date/Time: 6/4/2020 8:30 AM  Performed by: Agustin Randhawa MD  Authorized by: Agustin Randhawa MD     LACERATION DETAILS     Location:  Toe    Toe location:  L little toe    Length (cm):  0.8    Laceration depth: Full-thickness.    REPAIR TYPE:     Repair type:  Simple      EXPLORATION:     Hemostasis achieved with:  Direct pressure    Wound exploration: wound explored through full range of motion and entire depth of wound probed and visualized      Wound extent: foreign bodies/material ( Dirt in the foreign body.)      Foreign bodies/material:  Dirt    Contaminated: yes (Dirt within the wound)      TREATMENT:     Wound cleansed with: Chlorhexidine.    Amount of cleaning:  Standard    Irrigation solution:  Tap water    Visualized foreign bodies/material removed: yes (Removed with moistened gauze, moistened cotton tip swab and forceps)      SKIN REPAIR     Repair method:  Sutures    APPROXIMATION     Approximation:  Close    POST-PROCEDURE DETAILS     Dressing:  Antibiotic ointment      PROCEDURE   Patient Tolerance:  Patient tolerated the procedure well with no immediate complications                     No results found for this or any previous visit (from the past 24 hour(s)).    Medications - No data to display    Assessments & Plan (with Medical Decision Making)   44-year-old female with primary complaint of tick bite to the lower abdominal wall/groin 2 days ago with removal of an enlarged or engorged tick and concern for possible retained foreign body.  I do not appreciate any foreign body.  There is mild surrounding erythema localized within the 0.5 cm diameter and no EM type rash or evidence of Lyme disease.  In addition she complained of a 3-week old laceration to the base of the left little toe on the plantar aspect, of unknown  etiology and not closing.  There was a fissure or laceration which was contaminated with dirt, but no other foreign body identified on full exploration of the entire depth of the wound which did not appear to penetrate to the subcutaneous tissue except at the very central base in a depth of less than a millimeter.  I am comfortable that there is no foreign body within this wound after cleansing and removal of dirt.  Her feet were very dirty and she has been camping, wearing sandals.  No indication for x-rays at the present time.  The wound was carefully explored, cleansed and dirt removed prior to closure with a single suture of 4-0 Ethilon.  She was instructed on the suture removed in clinic in 1 week.  Will Rx prophylactic Augmentin for the tick bite with possible cellulitis and delayed primary closure of the foot/toe laceration.  Tetanus is up-to-date.  She was provided instructions for supportive care and will return as needed for worsened condition or worsening symptoms, or new problems or concerns.      I have reviewed the nursing notes.    I have reviewed the findings, diagnosis, plan and need for follow up with the patient.      Discharge Medication List as of 6/4/2020  9:05 AM      START taking these medications    Details   amoxicillin-clavulanate (AUGMENTIN) 875-125 MG tablet Take 1 tablet by mouth 2 times daily for 10 days, Disp-20 tablet,R-0, E-Prescribe             Final diagnoses:   Tick bite of groin, initial encounter   Laceration of lesser toe of left foot with foreign body without damage to nail, initial encounter       6/4/2020   [facility] EMERGENCY DEPARTMENT     Agustin Randhawa MD  06/04/20 [id number]

## 2020-06-04 NOTE — ED NOTES
Pt noticed a tick on her underwear line 3 days ago she thought she was getting a mole at first and is unsure how long the tick was in place however it was removed 3 days ago and now has a ring around it and is worried about Lyme's.

## 2020-08-18 ENCOUNTER — APPOINTMENT (OUTPATIENT)
Dept: GENERAL RADIOLOGY | Facility: CLINIC | Age: 45
End: 2020-08-18
Attending: PHYSICIAN ASSISTANT
Payer: MEDICARE

## 2020-08-18 ENCOUNTER — APPOINTMENT (OUTPATIENT)
Dept: CT IMAGING | Facility: CLINIC | Age: 45
End: 2020-08-18
Attending: PHYSICIAN ASSISTANT
Payer: MEDICARE

## 2020-08-18 ENCOUNTER — HOSPITAL ENCOUNTER (EMERGENCY)
Facility: CLINIC | Age: 45
Discharge: HOME | End: 2020-08-18
Attending: PHYSICIAN ASSISTANT | Admitting: PHYSICIAN ASSISTANT
Payer: MEDICARE

## 2020-08-18 VITALS
HEART RATE: 81 BPM | OXYGEN SATURATION: 98 % | DIASTOLIC BLOOD PRESSURE: 78 MMHG | BODY MASS INDEX: 33.28 KG/M2 | TEMPERATURE: 98.5 F | SYSTOLIC BLOOD PRESSURE: 118 MMHG | RESPIRATION RATE: 15 BRPM | WEIGHT: 200 LBS

## 2020-08-18 DIAGNOSIS — M54.9 ACUTE MIDLINE BACK PAIN, UNSPECIFIED BACK LOCATION: ICD-10-CM
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
## 2020-08-18 DIAGNOSIS — M25.562 ACUTE PAIN OF BOTH KNEES: ICD-10-CM

## 2020-08-18 DIAGNOSIS — M79.621 PAIN OF RIGHT UPPER ARM: ICD-10-CM

## 2020-08-18 DIAGNOSIS — M25.561 ACUTE PAIN OF BOTH KNEES: ICD-10-CM

## 2020-08-18 PROCEDURE — 72131 CT LUMBAR SPINE W/O DYE: CPT

## 2020-08-18 PROCEDURE — 73110 X-RAY EXAM OF WRIST: CPT | Mod: RT

## 2020-08-18 PROCEDURE — 99285 EMERGENCY DEPT VISIT HI MDM: CPT | Mod: 25 | Performed by: PHYSICIAN ASSISTANT

## 2020-08-18 PROCEDURE — 72128 CT CHEST SPINE W/O DYE: CPT

## 2020-08-18 PROCEDURE — 99284 EMERGENCY DEPT VISIT MOD MDM: CPT | Mod: Z6 | Performed by: PHYSICIAN ASSISTANT

## 2020-08-18 PROCEDURE — 73070 X-RAY EXAM OF ELBOW: CPT | Mod: RT

## 2020-08-18 PROCEDURE — 73560 X-RAY EXAM OF KNEE 1 OR 2: CPT | Mod: 50

## 2020-08-18 PROCEDURE — 25000132 ZZH RX MED GY IP 250 OP 250 PS 637: Mod: GY | Performed by: PHYSICIAN ASSISTANT

## 2020-08-18 PROCEDURE — 73030 X-RAY EXAM OF SHOULDER: CPT | Mod: RT

## 2020-08-18 PROCEDURE — 72125 CT NECK SPINE W/O DYE: CPT

## 2020-08-18 RX ORDER — ACETAMINOPHEN 500 MG
1000 TABLET ORAL ONCE
Status: COMPLETED | OUTPATIENT
Start: 2020-08-18 | End: 2020-08-18

## 2020-08-18 RX ADMIN — ACETAMINOPHEN 1000 MG: 500 TABLET, FILM COATED ORAL at 09:52

## 2020-08-18 ASSESSMENT — ENCOUNTER SYMPTOMS
NECK STIFFNESS: 0
CONFUSION: 0
ACTIVITY CHANGE: 0
SHORTNESS OF BREATH: 0
SPEECH DIFFICULTY: 0
VOMITING: 0
DIARRHEA: 0
SINUS PAIN: 0
RESPIRATORY NEGATIVE: 1
PSYCHIATRIC NEGATIVE: 1
EYES NEGATIVE: 1
CARDIOVASCULAR NEGATIVE: 1
DIZZINESS: 0
BACK PAIN: 1
NECK PAIN: 1
FATIGUE: 0
NUMBNESS: 0
HEADACHES: 0
WHEEZING: 0
WEAKNESS: 0
SORE THROAT: 0
AGITATION: 0
APPETITE CHANGE: 0
VOICE CHANGE: 0
CONSTITUTIONAL NEGATIVE: 1
NAUSEA: 0
LIGHT-HEADEDNESS: 0
FEVER: 0
GASTROINTESTINAL NEGATIVE: 1
RHINORRHEA: 0

## 2020-08-18 NOTE — DISCHARGE INSTRUCTIONS
Ice, heat, tylenol over the counter as needed, elevate, rest, sling as needing.     Use topical diclofenac gel as needed for pain (use a thin layer) up to 3 times a day.     Follow up with your rheumatologist and orthopedic specialist for further evaluation and treatment.    Primary care doctor within the next 2 to 3 days for recheck.    To the emergency department if symptoms worsen or change this includes pain out of proportion, numbness, decreased range of motion, redness to the joints, worst headache, dizziness, or change in symptoms.

## 2020-08-18 NOTE — ED AVS SNAPSHOT
[facility] Emergency Department  [address] [address]  Phone:  [phone]  Fax:  [phone]                                    Maris Wagner   MRN: [id number]    Department:  [facility] Emergency Department   Date of Visit:  8/18/2020           After Visit Summary Signature Page    I have received my discharge instructions, and my questions have been answered. I have discussed any challenges I see with this plan with the nurse or doctor.    ..........................................................................................................................................  Patient/Patient Representative Signature      ..........................................................................................................................................  Patient Representative Print Name and Relationship to Patient    ..................................................               ................................................  Date                                   Time    ..........................................................................................................................................  Reviewed by Signature/Title    ...................................................              ..............................................  Date                                               Time          22EPIC Rev 08/18

## 2020-08-18 NOTE — ED PROVIDER NOTES
"  History     Chief Complaint   Patient presents with     Fall     HPI  Maris Wagner is a 45 year old female with history of rheumatoid arthritis, chronic back pain, recurrent herniation of lumbar disc, lumbar discectomy, anemia of a bowel syndrome, obesity, gastric reflux disease, spinal stenosis of lumbar region who presents to the Emergency Room today with right sided upper arm pain, neck and back pain, and bilateral knee pain that has been ongoing and worsening since she fell down a 20 foot \"gravel incline/candie\" patient states she does not remember the fall, but boyfriend states he saw her walking down the gravel incline carrying wood in her arms to their campsite when she lost her balance and fell over the edge about 20 feet.  Patient's boyfriend states that he was able to get to her and get her down to the campsite where he took care of her.  Patient was then seen and evaluated on 5 August in the emergency department where she was seen for multiple joint pain and right-sided ear pain.  She states that she had no imaging done at that time and was given eardrops for her ear.  Patient states that since then she has had progressively worsening pain with swelling and a bump noted to the back of her neck over the cervical spine.  She has tenderness with movement of the neck and across the shoulders.  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  She also states that she has bilateral knee pain with the right being worse than the left.  Patient states when she bends her elbow and knees she feels like something is tearing in the joint.  Patient has been taking Tylenol and and her prescription pain medication that she has for her rheumatoid arthritis with no relief of symptoms.  She is not sure if this is just a flareup of her rheumatoid " arthritis or if this is related to the fall.  Patient denies any headache, confusion, dizziness, visual changes, ringing in the ears, chest pain or shortness of breath, rib pain, painful breathing, abdominal pain, nausea or vomiting, bruising to the back or abdomen or chest, no hip or pelvis pain, she denies groin pain, ankle or foot pain.  Patient denies any skin abrasions or lacerations. Patient states she still is having some right ear pain, but has taken the antibiotic ear drops as directed. Patient denies any injury to the ear, facial pain/bruising/swelling/bony tenderness, or skin abrasion.     Allergies:  Allergies   Allergen Reactions     Nka [No Known Allergies]        Problem List:    Patient Active Problem List    Diagnosis Date Noted     Morbid obesity (H) 08/23/2018     Priority: Medium     Rheumatoid arthritis involving multiple sites with positive rheumatoid factor (H) 09/25/2017     Priority: Medium     Chronic back pain 04/12/2017     Priority: Medium     Recurrent herniation of lumbar disc 12/28/2016     Priority: Medium     S/P lumbar discectomy 07/18/2016     Priority: Medium     Anemia 02/02/2015     Priority: Medium     Irritable bowel syndrome 02/02/2015     Priority: Medium     Obesity 02/02/2015     Priority: Medium     Gastroesophageal reflux disease      Priority: Medium     Diagnosis updated by automated process. Provider to review and confirm.       Ileus, postoperative (H) 01/31/2015     Priority: Medium     Spinal stenosis of lumbar region 01/29/2015     Priority: Medium     Herniation of lumbar intervertebral disc without myelopathy 01/29/2015     Priority: Medium     CARDIOVASCULAR SCREENING; LDL GOAL LESS THAN 130 12/10/2012     Priority: Medium     Seasonal allergies 02/02/2015     Priority: Low        Past Medical History:    History reviewed. No pertinent past medical history.    Past Surgical History:    Past Surgical History:   Procedure Laterality Date     ARTHROSCOPY ANKLE,  OPEN REPAIR LIGAMENT, COMBINED Right 8/3/2017    Procedure: COMBINED ARTHROSCOPY ANKLE, OPEN REPAIR LIGAMENT;  Right Ankle Arthroscopic Evaluation and Debridement,Lateral Ligament Repair,Fracture Evaluation, Open Reduction Internal Fixation ;  Surgeon: Clint Simon DPM;  Location: [facility]     ARTHROSCOPY ANKLE, OPEN REPAIR LIGAMENT, COMBINED Left 1/31/2019    Procedure: Left Ankle Arthroscopy Evaluation & Debridement & Cartilage Repair & Ligament Revision Repair & Medial Ankle Ligament & Tendon Repair.;  Surgeon: Clint Simon DPM;  Location: [facility]     OPEN REDUCTION INTERNAL FIXATION ANKLE Right 8/3/2017    Procedure: OPEN REDUCTION INTERNAL FIXATION ANKLE;;  Surgeon: Clint Simon DPM;  Location: [facility]     REMOVE FOREIGN BODY FINGER Left 3/28/2017    Procedure: REMOVE FOREIGN BODY FINGER;  Surgeon: Tabby Chan MD;  Location: [facility]     TUBAL LIGATION         Family History:    Family History   Problem Relation Age of Onset     Diabetes Mother         pre diabetes     Arthritis Mother      Hypertension Father      Cancer Maternal Grandmother      Cancer Maternal Grandfather      Cancer Paternal Grandmother      Cancer Paternal Grandfather         mesothelioma       Social History:  Marital Status:  Single [1]  Social History     Tobacco Use     Smoking status: Current Every Day Smoker     Packs/day: 0.50     Years: 25.00     Pack years: 12.50     Types: Cigarettes     Smokeless tobacco: Never Used   Substance Use Topics     Alcohol use: Yes     Comment: rare     Drug use: No        Medications:    diclofenac (VOLTAREN) 1 % topical gel  albuterol (PROAIR HFA/PROVENTIL HFA/VENTOLIN HFA) 108 (90 BASE) MCG/ACT Inhaler  methotrexate sodium 50 MG/2ML SOLN  order for DME      Review of Systems   Constitutional: Negative.  Negative for activity change, appetite change, fatigue and fever.   HENT: Positive for ear pain. Negative for congestion, ear discharge, postnasal drip, rhinorrhea, sinus  pain, sore throat and voice change.         No ringing in the ear or muffled hearing.    Eyes: Negative.  Negative for visual disturbance.   Respiratory: Negative.  Negative for shortness of breath and wheezing.    Cardiovascular: Negative.  Negative for chest pain.   Gastrointestinal: Negative.  Negative for diarrhea, nausea and vomiting.   Genitourinary: Negative.    Musculoskeletal: Positive for back pain, gait problem (painful gait due to bilateral knee pain. ) and neck pain. Negative for neck stiffness.        Right upper arm pain and decreased range of motion in the right shoulder and elbow.    Neurological: Negative for dizziness, speech difficulty, weakness, light-headedness, numbness and headaches.   Psychiatric/Behavioral: Negative.  Negative for agitation, behavioral problems and confusion.   All other systems reviewed and are negative.      Physical Exam   BP: 121/89  Pulse: 81  Heart Rate: 80  Temp: 98.5  F (36.9  C)  Resp: 16  Weight: 90.7 kg (200 lb)  SpO2: 98 %      Physical Exam  Vitals signs and nursing note reviewed.   Constitutional:       General: She is not in acute distress.     Appearance: Normal appearance. She is normal weight. She is not ill-appearing, toxic-appearing or diaphoretic.   HENT:      Head: Normocephalic and atraumatic.      Right Ear: Tympanic membrane, ear canal and external ear normal. There is no impacted cerumen.      Left Ear: Tympanic membrane, ear canal and external ear normal. There is no impacted cerumen.      Ears:      Comments: No mastoid tenderness bilaterally      Nose: Nose normal. No congestion or rhinorrhea.      Mouth/Throat:      Mouth: Mucous membranes are moist.      Pharynx: Oropharynx is clear. No oropharyngeal exudate or posterior oropharyngeal erythema.   Eyes:      General: No scleral icterus.        Right eye: No discharge.         Left eye: No discharge.      Extraocular Movements: Extraocular movements intact.      Conjunctiva/sclera: Conjunctivae  normal.      Pupils: Pupils are equal, round, and reactive to light.   Cardiovascular:      Rate and Rhythm: Normal rate and regular rhythm.   Pulmonary:      Effort: Pulmonary effort is normal.      Breath sounds: Normal breath sounds.   Abdominal:      General: Abdomen is flat. Bowel sounds are normal. There is no distension.      Palpations: Abdomen is soft.      Tenderness: There is no abdominal tenderness. There is no right CVA tenderness, left CVA tenderness or guarding.   Musculoskeletal:      Right shoulder: She exhibits decreased range of motion, tenderness, bony tenderness and pain. She exhibits no swelling, no effusion, no crepitus, no deformity, no laceration, no spasm, normal pulse and normal strength.      Right elbow: She exhibits decreased range of motion (patient unable to fully extend the right elbow. ). She exhibits no swelling, no effusion, no deformity and no laceration. Tenderness found.      Right wrist: She exhibits decreased range of motion, tenderness and bony tenderness. She exhibits no swelling, no effusion, no crepitus, no deformity and no laceration.      Right hip: Normal.      Left hip: Normal.      Right knee: She exhibits decreased range of motion, swelling and bony tenderness. She exhibits no effusion, no ecchymosis and normal patellar mobility. Tenderness found.      Left knee: She exhibits decreased range of motion and bony tenderness. She exhibits no swelling. Tenderness found.      Right ankle: Normal. Achilles tendon normal.      Left ankle: Normal. Achilles tendon normal.      Cervical back: She exhibits tenderness, bony tenderness, swelling (over the C5-T1 region with tenderness ) and pain. She exhibits no spasm and normal pulse.      Thoracic back: She exhibits tenderness, bony tenderness and pain. She exhibits no swelling, no edema, no deformity, no laceration, no spasm and normal pulse.      Lumbar back: She exhibits tenderness and bony tenderness. She exhibits no  swelling, no edema, no deformity, no laceration, no pain, no spasm and normal pulse.      Right hand: Normal sensation noted. Normal strength noted.      Right lower leg: No edema.      Left lower leg: No edema.      Comments: Positive painful range of motion in the cervical, thoracic and lumbar spine.     Skin:     General: Skin is warm.      Capillary Refill: Capillary refill takes less than 2 seconds.      Findings: No bruising, erythema, lesion or rash.      Comments: No skin abrasions or lacerations.    Neurological:      General: No focal deficit present.      Mental Status: She is alert and oriented to person, place, and time.      GCS: GCS eye subscore is 4. GCS verbal subscore is 5. GCS motor subscore is 6.      Cranial Nerves: Cranial nerves are intact. No facial asymmetry.      Sensory: Sensation is intact.      Motor: Motor function is intact.      Gait: Gait abnormal (limited due to knee pain. ).   Psychiatric:         Mood and Affect: Mood normal.         Behavior: Behavior normal.         Thought Content: Thought content normal.         Judgment: Judgment normal.         ED Course        Procedures              Critical Care time:  none               Results for orders placed or performed during the hospital encounter of 08/18/20 (from the past 24 hour(s))   CT Thoracic Spine w/o Contrast    Narrative    CT THORACIC SPINE WITHOUT CONTRAST   8/18/2020 9:15 AM     HISTORY:  Fall off of 20 foot candie 2 weeks ago with persistent back  pain and tenderness over the cervical and thoracic spine.     TECHNIQUE: Axial images of the thoracic spine were obtained without  intravenous contrast. Multiplanar reformations were performed.   Radiation dose for this scan was reduced using automated exposure  control, adjustment of the mA and/or kV according to patient size, or  iterative reconstruction technique.    COMPARISON: None.    FINDINGS:  No evidence of acute fracture or posttraumatic subluxation.  Alignment is  significant for dextroconvex curvature. Multilevel mild  to moderate degenerative disc disease is present most conspicuous  involving the lower thoracic spine with multiple Schmorl's nodes. Mild  lower thoracic spine costovertebral joint degenerative change is  present. No high-grade spinal canal or foraminal stenosis. Mild spinal  canal stenosis at T10-T11. Paraspinal soft tissues are unremarkable.  Few areas of probable right lung air trapping noted.      Impression    IMPRESSION:  No evidence of acute fracture.    SOLOMON MENDOZA MD   CT Cervical Spine w/o Contrast    Narrative    CT CERVICAL SPINE WITHOUT CONTRAST August 18, 2020 9:16 AM     HISTORY: Fall off of 20 foot candie two weeks ago with cervical spine  pain and tenderness.     TECHNIQUE: Axial images of the cervical spine were obtained without  intravenous contrast. Multiplanar reformations were performed.  Radiation dose for this scan was reduced using automated exposure  control, adjustment of the mA and/or kV according to patient size, or  iterative reconstruction technique.    COMPARISON: None.    FINDINGS: Straightening of the normal cervical lordosis. No loss of  vertebral body height. No acute lucent fracture line visualized. No  suspicious bony lesion. Mild degenerative endplate changes without  significant loss of intervertebral disc height at C5-C6. No  significant disc herniation. No spinal canal or neural foraminal  narrowing at any level.     Visualized paraspinous tissues: Unremarkable.      Impression    IMPRESSION:   1. No evidence of acute fracture or subluxation in the cervical spine.  2. Early degenerative disc changes at C5-C6. No spinal canal or neural  foraminal narrowing at any level.     ANTHONY RODRIGUEZ MD   CT Lumbar Spine w/o Contrast    Narrative    CT LUMBAR SPINE WITHOUT CONTRAST August 18, 2020 9:16 AM     HISTORY: Fall off of 20 foot candie almost two weeks ago with  persistent back pain.     TECHNIQUE: Axial images of the  lumbar spine were obtained without  intravenous contrast. Multiplanar reformations were performed.  Radiation dose for this scan was reduced using automated exposure  control, adjustment of the mA and/or kV according to patient size, or  iterative reconstruction technique.    COMPARISON: Lumbar spine CT 6/18/2016.    FINDINGS: Postoperative change of L3-L4 posterior fusion, L5-S1  anterior fusion, left L5-S1 posterior fusion, and right L5-S1 facet  joint screw fusion are demonstrated. No evidence of hardware  complication. Solid fusion is present across the L3-L4 disc space and  L5-S1 disc space. Solid fusion is present across the facet joints at  L3-L4 and L5-S1 Laminectomy change is present at L3-L4 and L5-S1.    No evidence of acute fracture or posttraumatic subluxation. No  high-grade spinal canal or foraminal stenosis. Posterior disc  calcification is present at L3-L4 at site of prior herniation.  Moderate degenerative disc disease is present at T12-L1 with disc  bulge contributing to mild spinal canal stenosis. Paraspinal soft  tissues are unremarkable.      Impression    IMPRESSION:    1. No evidence of acute fracture or posttraumatic subluxation.  2. No high-grade spinal canal or foraminal stenosis.  3. Postoperative change of multilevel fusions/laminectomies without  evidence of complication.       SOLOMON MENDOZA MD   XR Wrist Right 3 Views    Narrative    RIGHT WRIST THREE OR MORE VIEWS   8/18/2020 9:42 AM     HISTORY: Fall with injury and pain to right upper arm. Decreased range  of motion in shoulder, elbow, and wrist.      Impression    IMPRESSION: Unremarkable exam.   Knee XR, 2 views, bilateral    Narrative    KNEE BILATERAL ONE TO TWO VIEWS August 18, 2020 9:43 AM     HISTORY: Fell off of 20 foot candie two weeks ago with bilateral knee  pain.    COMPARISON: Left knee 12/7/2012.      Impression    IMPRESSION: Minimal bilateral joint effusions. No fracture identified.     XR Elbow Right 2 Views     "Narrative    ELBOW RIGHT TWO VIEWS August 18, 2020 9:44 AM     HISTORY: Fall with injury and pain to right upper arm. Decreased range  of motion in shoulder, elbow, and wrist.    COMPARISON: 5/13/2014.      Impression    IMPRESSION: Unremarkable exam.   XR Shoulder Right 2 Views    Narrative    SHOULDER TWO VIEWS RIGHT August 18, 2020 9:44 AM     HISTORY: Fall with injury and pain to right upper arm. Decreased range  of motion in shoulder, elbow, and wrist.    COMPARISON: 7/1/2019.    FINDINGS: Mild AC arthrosis. Otherwise unremarkable for technique.      Impression    IMPRESSION: No fracture identified.       Medications   acetaminophen (TYLENOL) tablet 1,000 mg (1,000 mg Oral Given 8/18/20 0952)       Assessments & Plan (with Medical Decision Making)     I have reviewed the nursing notes.    I have reviewed the findings, diagnosis, plan and need for follow up with the patient.    Maris Wagner is a 45 year old female with history of rheumatoid arthritis, chronic back pain, recurrent herniation of lumbar disc, lumbar discectomy, anemia of a bowel syndrome, obesity, gastric reflux disease, spinal stenosis of lumbar region who presents to the Emergency Room today with right sided upper arm pain, neck and back pain, and bilateral knee pain that has been ongoing and worsening since she fell down a 20 foot \"gravel incline/candie\" patient states she does not remember the fall, but boyfriend states he saw her walking down the gravel incline carrying wood in her arms to their campsite when she lost her balance and fell over the edge about 20 feet.  Patient's boyfriend states that he was able to get to her and get her down to the campsite where he took care of her.  Patient was then seen and evaluated on 5 August in the emergency department where she was seen for multiple joint pain and right-sided ear pain.  She states that she had no imaging done at that time and was given eardrops for her ear.  Patient states that since " then she has had progressively worsening pain with swelling and a bump noted to the back of her neck over the cervical spine.  She has tenderness with movement of the neck and across the shoulders.  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  She also states that she has bilateral knee pain with the right being worse than the left.  Patient states when she bends her elbow and knees she feels like something is tearing in the joint.  Patient has been taking Tylenol and and her prescription pain medication that she has for her rheumatoid arthritis with no relief of symptoms.  She is not sure if this is just a flareup of her rheumatoid arthritis or if this is related to the fall.  Patient denies any headache, confusion, dizziness, visual changes, ringing in the ears, chest pain or shortness of breath, rib pain, painful breathing, abdominal pain, nausea or vomiting, bruising to the back or abdomen or chest, no hip or pelvis pain, she denies groin pain, ankle or foot pain.  Patient denies any skin abrasions or lacerations. Patient states she still is having some right ear pain, but has taken the antibiotic ear drops as directed. Patient denies any injury to the ear, facial pain/bruising/swelling/bony tenderness, or skin abrasion.     See exam findings above. Patient sent to CT scan for cervical, thoracic, and lumbar spine which were all negative for acute fracture or subluxation. Degenerative changes noted on Cervical spine CT. X-ray to the right shoulder, elbow, wrist, and bilateral knees with no fracture noted. The knee x-rays show mild bilateral effusions. Patient given a dose of tylenol in the Emergency Room and was reassured that there were no fractures from injury 2 weeks ago. Patient to follow up with Rheumatology and given orthopedic  information to follow up. Patient sent home with prescription for diclofenac gel to use topically as needed for joint pain. Patient to return to Emergency Room if headache, dizziness, confusion, neck stiffness, worsening pain, redness to joints or change in symptoms occur. Patient discharged in stable condition with no concerns and normal vitals.     New Prescriptions    DICLOFENAC (VOLTAREN) 1 % TOPICAL GEL    Apply 2 g topically 3 times daily as needed for moderate pain       Final diagnoses:   Fall, initial encounter   Pain of right upper arm   Acute pain of both knees   Acute midline back pain, unspecified back location       8/18/2020   [facility] EMERGENCY DEPARTMENT     Poppy Reid PA-C  08/18/20 [id number]

## 2020-08-18 NOTE — ED TRIAGE NOTES
"Pt arrives with c/o right shoulder, arm, knee pain from a fall \" a couple of weeks ago \" Fell down a hill about 15 feet. Has numbness in hands from neuropathy. Pain feels different than the Rheumatoid arthritis pain.   "

## 2020-09-04 ENCOUNTER — APPOINTMENT (OUTPATIENT)
Dept: CT IMAGING | Facility: CLINIC | Age: 45
End: 2020-09-04
Attending: FAMILY MEDICINE
Payer: MEDICARE

## 2020-09-04 ENCOUNTER — HOSPITAL ENCOUNTER (EMERGENCY)
Facility: CLINIC | Age: 45
Discharge: HOME | End: 2020-09-04
Attending: FAMILY MEDICINE | Admitting: FAMILY MEDICINE
Payer: MEDICARE

## 2020-09-04 VITALS
HEART RATE: 77 BPM | DIASTOLIC BLOOD PRESSURE: 79 MMHG | BODY MASS INDEX: 31.39 KG/M2 | TEMPERATURE: 98.6 F | OXYGEN SATURATION: 98 % | HEIGHT: 67 IN | SYSTOLIC BLOOD PRESSURE: 107 MMHG | RESPIRATION RATE: 18 BRPM | WEIGHT: 200 LBS

## 2020-09-04 DIAGNOSIS — K04.7 DENTAL ABSCESS: ICD-10-CM

## 2020-09-04 PROCEDURE — 25000128 H RX IP 250 OP 636: Performed by: FAMILY MEDICINE

## 2020-09-04 PROCEDURE — 70486 CT MAXILLOFACIAL W/O DYE: CPT

## 2020-09-04 PROCEDURE — 96374 THER/PROPH/DIAG INJ IV PUSH: CPT | Performed by: FAMILY MEDICINE

## 2020-09-04 PROCEDURE — 96375 TX/PRO/DX INJ NEW DRUG ADDON: CPT | Performed by: FAMILY MEDICINE

## 2020-09-04 PROCEDURE — 99285 EMERGENCY DEPT VISIT HI MDM: CPT | Mod: Z6 | Performed by: FAMILY MEDICINE

## 2020-09-04 PROCEDURE — 99285 EMERGENCY DEPT VISIT HI MDM: CPT | Mod: 25 | Performed by: FAMILY MEDICINE

## 2020-09-04 RX ORDER — METHYLPREDNISOLONE SODIUM SUCCINATE 125 MG/2ML
80 INJECTION, POWDER, LYOPHILIZED, FOR SOLUTION INTRAMUSCULAR; INTRAVENOUS ONCE
Status: COMPLETED | OUTPATIENT
Start: 2020-09-04 | End: 2020-09-04

## 2020-09-04 RX ORDER — OXYCODONE HYDROCHLORIDE 5 MG/1
5 TABLET ORAL EVERY 6 HOURS PRN
Qty: 12 TABLET | Refills: 0 | Status: SHIPPED | OUTPATIENT
Start: 2020-09-04 | End: 2020-12-13

## 2020-09-04 RX ORDER — PENICILLIN V POTASSIUM 500 MG/1
500 TABLET, FILM COATED ORAL 3 TIMES DAILY
Qty: 30 TABLET | Refills: 0 | Status: SHIPPED | OUTPATIENT
Start: 2020-09-04 | End: 2020-09-14

## 2020-09-04 RX ORDER — KETOROLAC TROMETHAMINE 30 MG/ML
30 INJECTION, SOLUTION INTRAMUSCULAR; INTRAVENOUS ONCE
Status: DISCONTINUED | OUTPATIENT
Start: 2020-09-04 | End: 2020-09-04

## 2020-09-04 RX ORDER — LORAZEPAM 2 MG/ML
1 INJECTION INTRAMUSCULAR ONCE
Status: COMPLETED | OUTPATIENT
Start: 2020-09-04 | End: 2020-09-04

## 2020-09-04 RX ORDER — HYDROMORPHONE HYDROCHLORIDE 1 MG/ML
0.5 INJECTION, SOLUTION INTRAMUSCULAR; INTRAVENOUS; SUBCUTANEOUS
Status: DISCONTINUED | OUTPATIENT
Start: 2020-09-04 | End: 2020-09-04 | Stop reason: HOSPADM

## 2020-09-04 RX ADMIN — METHYLPREDNISOLONE SODIUM SUCCINATE 81.25 MG: 125 INJECTION, POWDER, FOR SOLUTION INTRAMUSCULAR; INTRAVENOUS at 06:39

## 2020-09-04 RX ADMIN — LORAZEPAM 1 MG: 2 INJECTION INTRAMUSCULAR; INTRAVENOUS at 06:37

## 2020-09-04 RX ADMIN — HYDROMORPHONE HYDROCHLORIDE 0.5 MG: 1 INJECTION, SOLUTION INTRAMUSCULAR; INTRAVENOUS; SUBCUTANEOUS at 07:34

## 2020-09-04 ASSESSMENT — MIFFLIN-ST. JEOR: SCORE: 1589.82

## 2020-09-04 NOTE — ED AVS SNAPSHOT
[facility] Emergency Department  [address] [address]  Phone:  [phone]  Fax:  [phone]                                    Maris Wagner   MRN: [id number]    Department:  [facility] Emergency Department   Date of Visit:  9/4/2020           After Visit Summary Signature Page    I have received my discharge instructions, and my questions have been answered. I have discussed any challenges I see with this plan with the nurse or doctor.    ..........................................................................................................................................  Patient/Patient Representative Signature      ..........................................................................................................................................  Patient Representative Print Name and Relationship to Patient    ..................................................               ................................................  Date                                   Time    ..........................................................................................................................................  Reviewed by Signature/Title    ...................................................              ..............................................  Date                                               Time          22EPIC Rev 08/18

## 2020-09-04 NOTE — DISCHARGE INSTRUCTIONS
Take penicillin 500 mg 3 times daily until you see the dentist.    Use ibuprofen 400-600 mg up to 4 times per day if needed for pain. Stop if it is causing nausea or abdominal pain.   Add acetaminophen 500 mg 1-2 pills up to every 4 hours if needed for pain.   You may add oxycodone 5 mg, 1-2 tablets up to every 6 hours if needed for pain.  Try to use this primarily only at night to help with sleep.    Do not use alcohol, operate machinery, drive, or climb on ladders for 8 hours after taking oxycodone. Use docusate (100mg) 2 times a day to prevent constipation while on narcotics.  See a dentist as soon as possible.  Many of these clinics offer a sliding fee option for patients that qualify, and see patients on a walk-in or same day basis. Please call each clinic directly. As services, hours, fees and policies vary greatly.          [facility], [facility]  [phone]  Sees no insurance  [facility], [place]  [phone]  Preventive services only  [facility] (mult loc) [phone]  [facility]    ([facility]), [facility]  [phone]  [facility]       [phone]  Preventive services only  [facility]  [phone]  Accepts MA & sees no ins  [facility], [facility],      Accepts MA & sees no ins   [place]   [phone]; [phone]  [facility], [facility]   Accepts MA & sees no ins       [phone]  [facility]ental [facility]  [phone]   Accepts MA emergencies  [facility] [phone]  [facility],     Accepts MA   [place]   [phone]    [facility] [phone]  Accepts MA & sees no ins   [facility]   Dental Clinic    [phone]  [facility], [facility]  [phone]   [facility] [phone]  [facility]  Preventive services  only   [place]   [phone]  [facility] [phone]  [facility], [place]  [phone]  Same day [facility] [phone]  Same day [facility],      Same day [facility]   [phone]    [facility], [facility] [phone]  Free clinic, walk-in only  [facility] (multiple locations) [phone]      [facility] , [facility] [phone]    [facility] [phone]  Free clinic, walk-in only  [facility]  [phone]  [facility] [phone] (adults)       [phone] (children)  [facility] [phone]    Also, referral service for low cost dental and healthcare: [phone]  And [phone]

## 2020-09-04 NOTE — ED NOTES
Pt sleepy, groggy, slurred speech, arousable to sound. Boyfriend alerted via call light that pt needed something more for pain. At this time, I am uncomfortable giving additional narcotics to pt due to current level of sedation. Ice pack has been offered for comfort.

## 2020-09-04 NOTE — ED PROVIDER NOTES
History     Chief Complaint   Patient presents with     Jaw Pain     Rt side  1 month/ near the ear     HPI  Maris Wagner is a 45 year old female who presents with her boyfriend by private car with right jaw pain.  Symptoms began about a month ago and have waxed and waned.  This morning she awoke with severe jaw pain.  There has not been any tooth pain or any jaw swelling.  There is pain with opening her mouth but no trismus.  She recalls no recent trauma.  She has no airway swelling or difficulty swallowing or speaking.  Early on in the course of this she was seen a month ago and treated for otitis externa.  He denies symptoms of recent illness.  She has had no recent cough, fever, dyspnea.    Allergies:  Allergies   Allergen Reactions     Nka [No Known Allergies]        Problem List:    Patient Active Problem List    Diagnosis Date Noted     Morbid obesity (H) 08/23/2018     Priority: Medium     Rheumatoid arthritis involving multiple sites with positive rheumatoid factor (H) 09/25/2017     Priority: Medium     Chronic back pain 04/12/2017     Priority: Medium     Recurrent herniation of lumbar disc 12/28/2016     Priority: Medium     S/P lumbar discectomy 07/18/2016     Priority: Medium     Anemia 02/02/2015     Priority: Medium     Irritable bowel syndrome 02/02/2015     Priority: Medium     Obesity 02/02/2015     Priority: Medium     Gastroesophageal reflux disease      Priority: Medium     Diagnosis updated by automated process. Provider to review and confirm.       Ileus, postoperative (H) 01/31/2015     Priority: Medium     Spinal stenosis of lumbar region 01/29/2015     Priority: Medium     Herniation of lumbar intervertebral disc without myelopathy 01/29/2015     Priority: Medium     CARDIOVASCULAR SCREENING; LDL GOAL LESS THAN 130 12/10/2012     Priority: Medium     Seasonal allergies 02/02/2015     Priority: Low        Past Medical History:    No past medical history on file.    Past Surgical History:     Past Surgical History:   Procedure Laterality Date     ARTHROSCOPY ANKLE, OPEN REPAIR LIGAMENT, COMBINED Right 8/3/2017    Procedure: COMBINED ARTHROSCOPY ANKLE, OPEN REPAIR LIGAMENT;  Right Ankle Arthroscopic Evaluation and Debridement,Lateral Ligament Repair,Fracture Evaluation, Open Reduction Internal Fixation ;  Surgeon: Clint Simon DPM;  Location: [facility]     ARTHROSCOPY ANKLE, OPEN REPAIR LIGAMENT, COMBINED Left 1/31/2019    Procedure: Left Ankle Arthroscopy Evaluation & Debridement & Cartilage Repair & Ligament Revision Repair & Medial Ankle Ligament & Tendon Repair.;  Surgeon: Clint Simon DPM;  Location: [facility]     OPEN REDUCTION INTERNAL FIXATION ANKLE Right 8/3/2017    Procedure: OPEN REDUCTION INTERNAL FIXATION ANKLE;;  Surgeon: Clint Simon DPM;  Location: [facility]     REMOVE FOREIGN BODY FINGER Left 3/28/2017    Procedure: REMOVE FOREIGN BODY FINGER;  Surgeon: Tabby Chan MD;  Location: [facility] OR     TUBAL LIGATION         Family History:    Family History   Problem Relation Age of Onset     Diabetes Mother         pre diabetes     Arthritis Mother      Hypertension Father      Cancer Maternal Grandmother      Cancer Maternal Grandfather      Cancer Paternal Grandmother      Cancer Paternal Grandfather         mesothelioma       Social History:  Marital Status:  Single [1]  Social History     Tobacco Use     Smoking status: Current Every Day Smoker     Packs/day: 0.50     Years: 25.00     Pack years: 12.50     Types: Cigarettes     Smokeless tobacco: Never Used   Substance Use Topics     Alcohol use: Yes     Comment: rare     Drug use: No        Medications:    oxyCODONE (ROXICODONE) 5 MG tablet  penicillin V (VEETID) 500 MG tablet  albuterol (PROAIR HFA/PROVENTIL HFA/VENTOLIN HFA) 108 (90 BASE) MCG/ACT Inhaler  diclofenac (VOLTAREN) 1 % topical gel  methotrexate sodium 50 MG/2ML SOLN  order for DME      Review of Systems  All other systems are reviewed and are  "negative    Physical Exam   BP: (!) 168/128  Pulse: 108  Temp: 98.6  F (37  C)  Resp: 18  Height: 170.2 cm (5' 7\")  Weight: 90.7 kg (200 lb)  SpO2: 96 %      Physical Exam  Nursing note and vitals were reviewed.  Constitutional: Awake and alert, adequately nourished and developed appearing 45-year-old in weeping and writhing in pain on the exam table, difficult to examine, poorly cooperative with examination..  HEENT: There is pain with range of motion of the jaw but no trismus.  There is no tenderness over the TMJ and no swelling or mass in the area of the parotid or in the submandibular space.  EACs clear.  TMs normal.  PERRLA.  Voice quality is normal.  EOMI.   Neck: Freely mobile.  No adenopathy or masses.  Pulmonary/Chest: Breathing is unlabored.   Neurological: Alert, oriented    Skin: Warm, dry, no rashes.  Psychiatric: Affect anxious, agitated, in severe pain      ED Course        Procedures               Critical Care time:  none               Results for orders placed or performed during the hospital encounter of 09/04/20 (from the past 24 hour(s))   CT Facial Bones without Contrast    Narrative    CT SCAN OF THE FACE WITHOUT CONTRAST 9/4/2020 7:09 AM     HISTORY: Right TMJ pain for four weeks.    TECHNIQUE: Axial CT images of the facial bones were completed with  sagittal and coronal reformations. Radiation dose for this scan was  reduced using automated exposure control, adjustment of the mA and/or  kV according to patient size, or iterative reconstruction technique.     COMPARISON: None.     FINDINGS:  No acute maxillofacial fracture. Specifically, no mandible  fracture is identified. The bilateral temporomandibular joints are  appropriately situated bilaterally. No CT findings of significant  degenerative change of either temporomandibular joint. The morphology  of the mandibular condyles and glenoid fossae appear grossly within  normal limits.    Multiple dental carious erosions are present. For " example, there are  prominent carious erosions involving the right second maxillary molar,  right maxillary canine, and right first maxillary premolar. There is  also a prominent carious erosion involving the most distal remaining  left mandibular molar. There is a prominent periapical abscess  involving the right first maxillary premolar. Scattered periodontal  disease is present involving the maxillary and mandibular teeth.    No mastoid or middle ear effusion. Mild to moderate mucosal thickening  primarily in the ethmoid air cells bilaterally. Leftward nasal septal  deviation. Bilateral marci bullosa of the middle nasal turbinates.  Soft tissue structures of both orbits appear normal. Incidental note  made of a cavum septum pellucidum et vergae with slight outward bowing  of the walls of the septum pellucidum, possibly representing a cavum  septum pellucidum cyst. No hydrocephalus within the visualized aspects  of the brain. Intracranial contents otherwise appear unremarkable for  age where visualized.      Impression    IMPRESSION:  1. No acute osseous abnormality. Specifically, no acute maxillofacial  fracture or dislocation.  2. Multiple dental carious erosions involving the maxillary and  mandibular teeth. Periapical abscess of the right first maxillary  premolar. Recommend dental consultation.  3. The mastoid air cells and middle ear cavities are clear.  4. Paranasal sinus mucosal thickening primarily involving the ethmoid  air cells.  5. Other incidental findings, as described.    CHLOÉ ALTAMIRANO MD       Medications   HYDROmorphone (PF) (DILAUDID) injection 0.5 mg (0.5 mg Intravenous Given 9/4/20 0734)   LORazepam (ATIVAN) injection 1 mg (1 mg Intravenous Given 9/4/20 0637)   methylPREDNISolone sodium succinate (solu-MEDROL) injection 81.25 mg (81.25 mg Intravenous Given 9/4/20 0639)       Assessments & Plan (with Medical Decision Making)     45-year-old female presented with right jaw pain for 4 weeks  more severe this morning.  Jaw motion was normal.  Voice quality was normal.  No difficulty with speech or swallowing.  No swelling with palpation over the TMJ, malar eminence.  EAC was normal without evidence of otitis externa.  TM was normal with no evidence of otitis media.  CT scan of the facial bones showed extensive dental caries and an apical abscess of the maxillary molar on that side.  The latter is the most likely cause of the patient's pain.  No evidence of TMJ arthritis or disc displacement is present.  No sign of parotitis or deep space abscess of the neck.  The cause of the pain is almost certainly the apical abscess in the maxillary molar.  I contacted [facility] dental services but they are unavailable to see her for at least a week.  I discussed the findings with the patient and her boyfriend.  I recommended penicillin until she can see a dentist.  She should try to do that today.  I gave her my list of dental providers.  Return if evidence of facial or neck infection spreading to deeper structures.    I have reviewed the nursing notes.    I have reviewed the findings, diagnosis, plan and need for follow up with the patient.       New Prescriptions    OXYCODONE (ROXICODONE) 5 MG TABLET    Take 1 tablet (5 mg) by mouth every 6 hours as needed for pain    PENICILLIN V (VEETID) 500 MG TABLET    Take 1 tablet (500 mg) by mouth 3 times daily for 10 days       Final diagnoses:   Dental abscess       9/4/2020   [facility] EMERGENCY DEPARTMENT     Brown Chua MD  09/04/20 0832

## 2020-12-13 ENCOUNTER — HOSPITAL ENCOUNTER (EMERGENCY)
Facility: CLINIC | Age: 45
Discharge: HOME | End: 2020-12-13
Attending: NURSE PRACTITIONER | Admitting: NURSE PRACTITIONER
Payer: MEDICARE

## 2020-12-13 ENCOUNTER — APPOINTMENT (OUTPATIENT)
Dept: GENERAL RADIOLOGY | Facility: CLINIC | Age: 45
End: 2020-12-13
Attending: EMERGENCY MEDICINE
Payer: MEDICARE

## 2020-12-13 VITALS
DIASTOLIC BLOOD PRESSURE: 82 MMHG | HEIGHT: 65 IN | OXYGEN SATURATION: 98 % | HEART RATE: 81 BPM | BODY MASS INDEX: 36.65 KG/M2 | WEIGHT: 220 LBS | SYSTOLIC BLOOD PRESSURE: 154 MMHG | TEMPERATURE: 97 F | RESPIRATION RATE: 16 BRPM

## 2020-12-13 DIAGNOSIS — M25.572 ACUTE LEFT ANKLE PAIN: ICD-10-CM

## 2020-12-13 PROCEDURE — G0463 HOSPITAL OUTPT CLINIC VISIT: HCPCS | Performed by: NURSE PRACTITIONER

## 2020-12-13 PROCEDURE — 99214 OFFICE O/P EST MOD 30 MIN: CPT | Performed by: NURSE PRACTITIONER

## 2020-12-13 PROCEDURE — 73610 X-RAY EXAM OF ANKLE: CPT | Mod: LT

## 2020-12-13 RX ORDER — PREGABALIN 100 MG/1
100 CAPSULE ORAL 3 TIMES DAILY
COMMUNITY
Start: 2020-07-16

## 2020-12-13 RX ORDER — CELECOXIB 200 MG/1
200 CAPSULE ORAL 2 TIMES DAILY PRN
Qty: 28 CAPSULE | Refills: 0 | Status: SHIPPED | OUTPATIENT
Start: 2020-12-13 | End: 2020-12-27

## 2020-12-13 ASSESSMENT — MIFFLIN-ST. JEOR: SCORE: 1648.79

## 2020-12-13 NOTE — ED PROVIDER NOTES
History     Chief Complaint   Patient presents with     Ankle Pain     left ankle pain, started yesterday after rolling it 3 times; states she's had surgery on this ankle in the past.      HPI  Maris Wagner is a 45 year old female who presents with acute left ankle pain following rolling the ankle yesterday 3X.  Pt she is having inferior ankle pain that is a sensation of burning, slicing, cutting pain.  Pt states posterior ankle pain also.  PT rates pain 06/10 to 07/10.  Pt has tried ice, ibuprofen with minimal improvement.  Pt states 3 previous surgeries with Dr. Simon with last surgery one year ago.    Patient denies fever, aches, chills, sweats, ear pain, eye pain, throat pain, chest pain, cough, wheezing, shortness of breath, abdominal pain, nausea, vomiting, diarrhea, hematuria, dysuria, speech difficulty, left or right-sided body weakness, mental confusion, thoughts of harming self.  Patient reports feeling well otherwise    Allergies:  Allergies   Allergen Reactions     Nka [No Known Allergies]        Problem List:    Patient Active Problem List    Diagnosis Date Noted     Morbid obesity (H) 08/23/2018     Priority: Medium     Rheumatoid arthritis involving multiple sites with positive rheumatoid factor (H) 09/25/2017     Priority: Medium     Chronic back pain 04/12/2017     Priority: Medium     Recurrent herniation of lumbar disc 12/28/2016     Priority: Medium     S/P lumbar discectomy 07/18/2016     Priority: Medium     Anemia 02/02/2015     Priority: Medium     Irritable bowel syndrome 02/02/2015     Priority: Medium     Obesity 02/02/2015     Priority: Medium     Gastroesophageal reflux disease      Priority: Medium     Diagnosis updated by automated process. Provider to review and confirm.       Ileus, postoperative (H) 01/31/2015     Priority: Medium     Spinal stenosis of lumbar region 01/29/2015     Priority: Medium     Herniation of lumbar intervertebral disc without myelopathy 01/29/2015      Priority: Medium     Neuropathy 01/01/2013     Priority: Medium     CARDIOVASCULAR SCREENING; LDL GOAL LESS THAN 130 12/10/2012     Priority: Medium     Seasonal allergies 02/02/2015     Priority: Low        Past Medical History:    History reviewed. No pertinent past medical history.    Past Surgical History:    Past Surgical History:   Procedure Laterality Date     ARTHROSCOPY ANKLE, OPEN REPAIR LIGAMENT, COMBINED Right 8/3/2017    Procedure: COMBINED ARTHROSCOPY ANKLE, OPEN REPAIR LIGAMENT;  Right Ankle Arthroscopic Evaluation and Debridement,Lateral Ligament Repair,Fracture Evaluation, Open Reduction Internal Fixation ;  Surgeon: Clint Simon DPM;  Location: [facility]     ARTHROSCOPY ANKLE, OPEN REPAIR LIGAMENT, COMBINED Left 1/31/2019    Procedure: Left Ankle Arthroscopy Evaluation & Debridement & Cartilage Repair & Ligament Revision Repair & Medial Ankle Ligament & Tendon Repair.;  Surgeon: Clint Simon DPM;  Location: [facility]     OPEN REDUCTION INTERNAL FIXATION ANKLE Right 8/3/2017    Procedure: OPEN REDUCTION INTERNAL FIXATION ANKLE;;  Surgeon: Clint Simon DPM;  Location: [facility]     REMOVE FOREIGN BODY FINGER Left 3/28/2017    Procedure: REMOVE FOREIGN BODY FINGER;  Surgeon: Tabby Chan MD;  Location: [facility] [place]     TUBAL LIGATION         Family History:    Family History   Problem Relation Age of Onset     Diabetes Mother         pre diabetes     Arthritis Mother      Hypertension Father      Cancer Maternal Grandmother      Cancer Maternal Grandfather      Cancer Paternal Grandmother      Cancer Paternal Grandfather         mesothelioma       Social History:  Marital Status:  Single [1]  Social History     Tobacco Use     Smoking status: Current Every Day Smoker     Packs/day: 0.50     Years: 25.00     Pack years: 12.50     Types: Cigarettes     Smokeless tobacco: Never Used   Substance Use Topics     Alcohol use: Yes     Comment: rare     Drug use: No        Medications:          "celecoxib (CELEBREX) 200 MG capsule       albuterol (PROAIR HFA/PROVENTIL HFA/VENTOLIN HFA) 108 (90 BASE) MCG/ACT Inhaler       order for DME       pregabalin (LYRICA) 100 MG capsule          Review of Systems  As mentioned above in the history present illness. All other systems were reviewed and are negative.    Physical Exam   BP: (!) 154/82  Pulse: 81  Temp: 97  F (36.1  C)  Resp: 16  Height: 165.1 cm (5' 5\")  Weight: 99.8 kg (220 lb)  SpO2: 98 %      Physical Exam  Vitals signs and nursing note reviewed.   Constitutional:       General: She is not in acute distress.     Appearance: She is well-developed. She is obese. She is not ill-appearing, toxic-appearing or diaphoretic.      Comments: Tearful intermittently   HENT:      Head: Normocephalic and atraumatic.      Right Ear: External ear normal.      Left Ear: External ear normal.   Eyes:      General:         Right eye: No discharge.         Left eye: No discharge.      Conjunctiva/sclera: Conjunctivae normal.   Cardiovascular:      Rate and Rhythm: Regular rhythm.      Heart sounds: Normal heart sounds. No murmur. No friction rub.   Pulmonary:      Effort: Pulmonary effort is normal. No respiratory distress.      Breath sounds: Normal breath sounds. No stridor. No wheezing or rales.   Chest:      Chest wall: No tenderness.   Musculoskeletal:      Right ankle: Normal. She exhibits no laceration and normal pulse.      Left ankle: She exhibits decreased range of motion (uncertain if due to pre-existing ankle surgical hx or current injury). She exhibits no swelling, no ecchymosis, no deformity, no laceration and normal pulse. Tenderness. Medial malleolus, CF ligament and posterior TFL tenderness found. Achilles tendon exhibits normal Maria's test results.   Skin:     General: Skin is warm and dry.      Coloration: Skin is not pale.      Findings: No erythema or rash.   Neurological:      Mental Status: She is alert.         ED Course      "   Procedures    Results for orders placed or performed during the hospital encounter of 12/13/20 (from the past 24 hour(s))   XR Ankle Left G/E 3 Views    Narrative    XR ANKLE LT G/E 3 VW 12/13/2020 11:56 AM     HISTORY: twisted left ankle; Hx of surgery on ankle    COMPARISON: 12/4/2018      Impression    IMPRESSION: No fractures are identified. The ankle mortise is intact.  The talar dome is unremarkable. Mild hypertrophic change in the  talonavicular joint. Findings are stable in the interval.     FREDI COYNE MD       Medications - No data to display    Assessments & Plan (with Medical Decision Making)  Maris Wagner is a 45 year old female who presents with acute left ankle pain following rolling the ankle yesterday 3X.  Pt she is having inferior ankle pain that is a sensation of burning, slicing, cutting pain.  Pt states posterior ankle pain also.  PT rates pain 06/10 to 07/10.  Pt has tried ice, ibuprofen with minimal improvement.  Pt states 3 previous surgeries with Dr. Simon with last surgery one year ago.  On exam patient has no obvious deformities.  Pedal pulses are equal bilaterally.  Patient stating history of neuropathy on the left side of her body and states she typically has pain.  Patient has a negative Maria test.  X-ray obtained to evaluate for fracture reveals no acute fracture with an intact mortise and talar dome that is unremarkable.  Reviewed these findings with patient.  Recommend cam walker boot as patient reports history of 3 ankle surgeries.  Differential includes possible ankle sprain versus disruption of previous surgical intervention but at this point in time no evidence of fracture.  There is noted with scar on the medial malleolus and no disruption of the scar and it appears old.  Orthopedic referral placed.  Offered walker and this was declined.  Discussed pain management with Tylenol and Celebrex.  Offered note for work.  This was declined.  Patient is previously seen   Shalonda and this was noted in the referral.  Patient discharged in stable condition.       I have reviewed the nursing notes.    I have reviewed the findings, diagnosis, plan and need for follow up with the patient.    New Prescriptions    CELECOXIB (CELEBREX) 200 MG CAPSULE    Take 1 capsule (200 mg) by mouth 2 times daily as needed for moderate pain       Final diagnoses:   Acute left ankle pain       12/13/2020   [facility] EMERGENCY DEPT     Leeanne Pinto, CORINA CNP  12/13/20 4151

## 2020-12-13 NOTE — ED AVS SNAPSHOT
[facility] Emergency Dept  [address] [address]  Phone: [phone]  Fax: [phone]                                    Maris Wagner   MRN: [id number]    Department: [facility] Emergency Dept   Date of Visit: 12/13/2020           After Visit Summary Signature Page    I have received my discharge instructions, and my questions have been answered. I have discussed any challenges I see with this plan with the nurse or doctor.    ..........................................................................................................................................  Patient/Patient Representative Signature      ..........................................................................................................................................  Patient Representative Print Name and Relationship to Patient    ..................................................               ................................................  Date                                   Time    ..........................................................................................................................................  Reviewed by Signature/Title    ...................................................              ..............................................  Date                                               Time          22EPIC Rev 08/18

## 2020-12-13 NOTE — DISCHARGE INSTRUCTIONS
Wear the cam walker boot at all times  Follow up with Dr. Simon  Tylenol 1000 mg 4 times daily with food  Celebrex up to twice daily with food

## 2020-12-14 ENCOUNTER — OFFICE VISIT (OUTPATIENT)
Dept: PODIATRY | Facility: CLINIC | Age: 45
End: 2020-12-14
Payer: MEDICARE

## 2020-12-14 VITALS
HEART RATE: 95 BPM | DIASTOLIC BLOOD PRESSURE: 80 MMHG | WEIGHT: 220 LBS | HEIGHT: 65 IN | BODY MASS INDEX: 36.65 KG/M2 | SYSTOLIC BLOOD PRESSURE: 138 MMHG

## 2020-12-14 DIAGNOSIS — M25.372 INSTABILITY OF ANKLE JOINT, LEFT: Primary | ICD-10-CM

## 2020-12-14 DIAGNOSIS — M25.572 ACUTE LEFT ANKLE PAIN: ICD-10-CM

## 2020-12-14 PROCEDURE — 99203 OFFICE O/P NEW LOW 30 MIN: CPT | Performed by: PODIATRIST

## 2020-12-14 ASSESSMENT — PAIN SCALES - GENERAL: PAINLEVEL: SEVERE PAIN (6)

## 2020-12-14 ASSESSMENT — MIFFLIN-ST. JEOR: SCORE: 1648.79

## 2020-12-14 NOTE — NURSING NOTE
"Chief Complaint   Patient presents with     Consult     XR Left ankle \"4 past surgies by Dr. Liz\" pain is on bottom side of foot       Initial /80   Pulse 95   Ht 1.651 m (5' 5\")   Wt 99.8 kg (220 lb)   BMI 36.61 kg/m   Estimated body mass index is 36.61 kg/m  as calculated from the following:    Height as of this encounter: 1.651 m (5' 5\").    Weight as of this encounter: 99.8 kg (220 lb).  Medications and allergies reviewed.      Delma KELLY MA    "

## 2020-12-14 NOTE — PROGRESS NOTES
PATIENT HISTORY:  Maris Wagner is a 45 year old female with rheumatoid arthritis peripheral neuropathy and spinal stenosis of the lumbar region who presents to clinic with a chief complaint of a painful left ankle.  The patient relates that she rolled her ankle 3 times on Saturday.  She has a history of chronic ankle instability and has undergone multiple surgical ligament repairs on the left ankle with the last surgery performed a year ago.   The patient was evaluated in the ER on Saturday where x-rays were taken.  Any previous notes and studies that pertain to the patient's condition were reviewed.    REVIEW OF SYSTEMS:  Constitutional, HEENT, cardiovascular, pulmonary, GI, , musculoskeletal, neuro, skin, endocrine and psych systems are negative, except as otherwise noted.     PAST MEDICAL HISTORY: Chronic back pain with neuropathy and lumbar radiculopathy, spinal stenosis recurrent herniation of lumbar disc, gastric reflux disease, irritable bowel syndrome, obesity and rheumatoid arthritis.    PAST SURGICAL HISTORY:   Past Surgical History:   Procedure Laterality Date     ARTHROSCOPY ANKLE, OPEN REPAIR LIGAMENT, COMBINED Right 8/3/2017    Procedure: COMBINED ARTHROSCOPY ANKLE, OPEN REPAIR LIGAMENT;  Right Ankle Arthroscopic Evaluation and Debridement,Lateral Ligament Repair,Fracture Evaluation, Open Reduction Internal Fixation ;  Surgeon: Clint Simon DPM;  Location: [facility]     ARTHROSCOPY ANKLE, OPEN REPAIR LIGAMENT, COMBINED Left 1/31/2019    Procedure: Left Ankle Arthroscopy Evaluation & Debridement & Cartilage Repair & Ligament Revision Repair & Medial Ankle Ligament & Tendon Repair.;  Surgeon: Clint Simon DPM;  Location: [facility]     OPEN REDUCTION INTERNAL FIXATION ANKLE Right 8/3/2017    Procedure: OPEN REDUCTION INTERNAL FIXATION ANKLE;;  Surgeon: Clint Simon DPM;  Location: [facility]     REMOVE FOREIGN BODY FINGER Left 3/28/2017    Procedure: REMOVE FOREIGN BODY FINGER;  Surgeon:  Tabby Chan MD;  Location: [facility]     TUBAL LIGATION          MEDICATIONS:   Current Outpatient Medications:      albuterol (PROAIR HFA/PROVENTIL HFA/VENTOLIN HFA) 108 (90 BASE) MCG/ACT Inhaler, Inhale 2 puffs into the lungs every 6 hours, Disp: , Rfl:      celecoxib (CELEBREX) 200 MG capsule, Take 1 capsule (200 mg) by mouth 2 times daily as needed for moderate pain, Disp: 28 capsule, Rfl: 0     pregabalin (LYRICA) 100 MG capsule, Take 100 mg by mouth 3 times daily, Disp: , Rfl:      sodium chloride 0.9 % SOLN 250 mL with ramucirumab 10 MG/ML SOLN infusion, Arthritis, once every 2 weeks, Disp: , Rfl:      order for DME, Equipment being ordered:ankle brace (Patient not taking: Reported on 12/14/2020), Disp: 1 Units, Rfl: 0     ALLERGIES:    Allergies   Allergen Reactions     Nka [No Known Allergies]         SOCIAL HISTORY:   Social History     Socioeconomic History     Marital status: Single     Spouse name: Not on file     Number of children: Not on file     Years of education: Not on file     Highest education level: Not on file   Occupational History     Not on file   Social Needs     Financial resource strain: Not on file     Food insecurity     Worry: Not on file     Inability: Not on file     Transportation needs     Medical: Not on file     Non-medical: Not on file   Tobacco Use     Smoking status: Current Every Day Smoker     Packs/day: 0.50     Years: 25.00     Pack years: 12.50     Types: Cigarettes     Smokeless tobacco: Never Used   Substance and Sexual Activity     Alcohol use: Yes     Comment: rare     Drug use: No     Sexual activity: Yes     Partners: Male   Lifestyle     Physical activity     Days per week: Not on file     Minutes per session: Not on file     Stress: Not on file   Relationships     Social connections     Talks on phone: Not on file     Gets together: Not on file     Attends Spiritism service: Not on file     Active member of club or organization: Not on file     Attends  "meetings of clubs or organizations: Not on file     Relationship status: Not on file     Intimate partner violence     Fear of current or ex partner: Not on file     Emotionally abused: Not on file     Physically abused: Not on file     Forced sexual activity: Not on file   Other Topics Concern     Parent/sibling w/ CABG, MI or angioplasty before 65F 55M? No   Social History Narrative     Not on file        FAMILY HISTORY:   Family History   Problem Relation Age of Onset     Diabetes Mother         pre diabetes     Arthritis Mother      Hypertension Father      Cancer Maternal Grandmother      Cancer Maternal Grandfather      Cancer Paternal Grandmother      Cancer Paternal Grandfather         mesothelioma        EXAM:Vitals: /80   Pulse 95   Ht 1.651 m (5' 5\")   Wt 99.8 kg (220 lb)   BMI 36.61 kg/m              General appearance: Patient is alert and fully cooperative with history & exam.  No sign of distress is noted during the visit.     Psychiatric: Affect is pleasant & appropriate.  Patient appears motivated to improve health.     Respiratory: Breathing is regular & unlabored while sitting.     HEENT: Hearing is intact to spoken word.  Speech is clear.  No gross evidence of visual impairment that would impact ambulation.     Dermatologic: Skin is intact to left lower extremities without significant lesions, rash or abrasion.        Vascular: DP & PT pulses are intact & regular on the left.   CFT and skin temperature is normal to the left lower extremities.     Neurologic: Lower extremity sensation is intact to light touch.  No evidence of weakness in the left lower extremities.        Musculoskeletal: Patient is ambulatory without assistive device or brace.  No gross ankle deformity noted.  No foot or ankle joint effusion is noted.  Noted pain on palpation over the medial aspect of the left ankle.  No surrounding erythema or edema noted.  No ecchymosis noted.  Pain on palpation noted over the " Achilles tendon on the left.    Radiographs were reviewed including non weightbearing AP, lateral and mortise views of the left ankle reveals evidence of previous ligament repair over the medial lateral malleolus.  No cortical erosions or periosteal elevation.  The ankle mortise appear stable.  There is no apparent fracture or tumor formation noted.  There is no evidence of foreign body.    ASSESSMENT / PLAN:     ICD-10-CM    1. Acute left ankle pain  M25.572 Orthopedic & Spine  Referral       I have explained to Maris  about the conditions.  We discussed the underlying contributing factors of the condition as well as the treatment plan and expected length of recovery.  At this time, the patient will remain in a cam boot and instructed on icing the left ankle.  The patient will return in 2 weeks for reevaluation.  She may benefit from an AFO ankle brace can be worn with her shoes for the long-term.  We will discuss more about this at her next appointment.    Maris verbalized agreement with and understanding of the rational for the diagnosis and treatment plan.  All questions were answered to best of my ability and the patient's satisfaction. The patient was advised to contact the clinic with any questions that may arise after the clinic visit.      Disclaimer: This note consists of symbols derived from keyboarding, dictation and/or voice recognition software. As a result, there may be errors in the script that have gone undetected. Please consider this when interpreting information found in this chart.       CHAVEZ Cerda D.P.M., FMARISEL.[email or web address].S.

## 2020-12-14 NOTE — PATIENT INSTRUCTIONS
Next Steps:      1. Support:  a. Wear supportive shoes, sandals, boots and/or inserts that have a rigid supportive sole.    i. This will offload the majority of tension forces that travel through your feet every step you take.    b. It is important that you also wear supportive shoe wear in the house to continue providing support to your feet.    c. You may always use a cushioned liner for your shoes if that makes your feet feel better.  2. Stretching  a. Calf stretching is essential to offload the tension forces that travel through your feet every step you take  b. Preferred calf stretch is the Runner's Stretch  i. Place one foot behind the other foot, flat against the ground (it is important to keep the heel on the ground).  The back leg is the one that will be stretched.  1. Start with the knee straight and lean your hips into the wall, counter or whatever you are leaning into - count to ten.  2. Next, bend the knee.  You should feel the stretch lower in the calf muscle - count to ten.  c. Repeat this stretch once an hour to start off with.  When symptoms subside, I recommend performing the stretch 3 times daily to prevent any future problems.  3. Tissue Massage  a. It is important that you physically loosen the inflammation tissue to help your body heal the injured tissue.  b. I recommend soaking your foot in warm water to increase the microcirculation to the soft tissues.  You may add Epson salt to the water if you prefer.  c. You may apply an over-the-counter muscle rub, such as Voltaren or Aspercreme, and deeply massage the injured tissue.  4. Reduce Inflammation  a. You can ice the injured tissue with an ice pack with a light cloth covering or soaking in ice water 20 minutes to reduce any acute inflammation, typically at the end of the day.  b. If tolerated, you may take a Non-Steroidal Antiinflammatory medication (NSAID), such as Advil or Aleve, to help reduce the inflammation tissue.  This can help the  overall healing of the injured tissue.  i. It is important to take food with any NSAID medication as the most common side effect is stomach irritation.  If you encounter any problems when taking NSAID, it is recommended that you stop taking the medication and notify your provider.    It is important to understand that most problems that develop in the foot and ankle are caused by excessive tension that cause microinjury to the soft tissues and inflammation in the foot and ankle.  By addressing the underlying causes with support and stretching as well as treating the current inflammatory conditions with tissue massage and anti-inflammatory treatments, most foot and ankle musculoskeletal conditions will resolve.  This may take time to heal.  However, if symptoms persist past 4 weeks you should return to the office for reevaluation to determine further treatment options.

## 2020-12-14 NOTE — LETTER
12/14/2020         RE: Maris Wagner  [address]  [facility] [address]        Dear Colleague,    Thank you for referring your patient, Maris Wagner, to the [facility]. Please see a copy of my visit note below.    PATIENT HISTORY:  Maris Wagner is a 45 year old female with rheumatoid arthritis peripheral neuropathy and spinal stenosis of the lumbar region who presents to clinic with a chief complaint of a painful left ankle.  The patient relates that she rolled her ankle 3 times on Saturday.  She has a history of chronic ankle instability and has undergone multiple surgical ligament repairs on the left ankle with the last surgery performed a year ago.   The patient was evaluated in the ER on Saturday where x-rays were taken.  Any previous notes and studies that pertain to the patient's condition were reviewed.    REVIEW OF SYSTEMS:  Constitutional, HEENT, cardiovascular, pulmonary, GI, , musculoskeletal, neuro, skin, endocrine and psych systems are negative, except as otherwise noted.     PAST MEDICAL HISTORY: Chronic back pain with neuropathy and lumbar radiculopathy, spinal stenosis recurrent herniation of lumbar disc, gastric reflux disease, irritable bowel syndrome, obesity and rheumatoid arthritis.    PAST SURGICAL HISTORY:   Past Surgical History:   Procedure Laterality Date     ARTHROSCOPY ANKLE, OPEN REPAIR LIGAMENT, COMBINED Right 8/3/2017    Procedure: COMBINED ARTHROSCOPY ANKLE, OPEN REPAIR LIGAMENT;  Right Ankle Arthroscopic Evaluation and Debridement,Lateral Ligament Repair,Fracture Evaluation, Open Reduction Internal Fixation ;  Surgeon: Clint Simon DPM;  Location: [facility]     ARTHROSCOPY ANKLE, OPEN REPAIR LIGAMENT, COMBINED Left 1/31/2019    Procedure: Left Ankle Arthroscopy Evaluation & Debridement & Cartilage Repair & Ligament Revision Repair & Medial Ankle Ligament & Tendon Repair.;  Surgeon: Clint Simon DPM;  Location: [facility]     OPEN REDUCTION  INTERNAL FIXATION ANKLE Right 8/3/2017    Procedure: OPEN REDUCTION INTERNAL FIXATION ANKLE;;  Surgeon: Clint Simon DPM;  Location: [facility]     REMOVE FOREIGN BODY FINGER Left 3/28/2017    Procedure: REMOVE FOREIGN BODY FINGER;  Surgeon: Tabby Chan MD;  Location: [facility]     TUBAL LIGATION          MEDICATIONS:   Current Outpatient Medications:      albuterol (PROAIR HFA/PROVENTIL HFA/VENTOLIN HFA) 108 (90 BASE) MCG/ACT Inhaler, Inhale 2 puffs into the lungs every 6 hours, Disp: , Rfl:      celecoxib (CELEBREX) 200 MG capsule, Take 1 capsule (200 mg) by mouth 2 times daily as needed for moderate pain, Disp: 28 capsule, Rfl: 0     pregabalin (LYRICA) 100 MG capsule, Take 100 mg by mouth 3 times daily, Disp: , Rfl:      sodium chloride 0.9 % SOLN 250 mL with ramucirumab 10 MG/ML SOLN infusion, Arthritis, once every 2 weeks, Disp: , Rfl:      order for DME, Equipment being ordered:ankle brace (Patient not taking: Reported on 12/14/2020), Disp: 1 Units, Rfl: 0     ALLERGIES:    Allergies   Allergen Reactions     Nka [No Known Allergies]         SOCIAL HISTORY:   Social History     Socioeconomic History     Marital status: Single     Spouse name: Not on file     Number of children: Not on file     Years of education: Not on file     Highest education level: Not on file   Occupational History     Not on file   Social Needs     Financial resource strain: Not on file     Food insecurity     Worry: Not on file     Inability: Not on file     Transportation needs     Medical: Not on file     Non-medical: Not on file   Tobacco Use     Smoking status: Current Every Day Smoker     Packs/day: 0.50     Years: 25.00     Pack years: 12.50     Types: Cigarettes     Smokeless tobacco: Never Used   Substance and Sexual Activity     Alcohol use: Yes     Comment: rare     Drug use: No     Sexual activity: Yes     Partners: Male   Lifestyle     Physical activity     Days per week: Not on file     Minutes per session: Not on  "file     Stress: Not on file   Relationships     Social connections     Talks on phone: Not on file     Gets together: Not on file     Attends Hoahaoism service: Not on file     Active member of club or organization: Not on file     Attends meetings of clubs or organizations: Not on file     Relationship status: Not on file     Intimate partner violence     Fear of current or ex partner: Not on file     Emotionally abused: Not on file     Physically abused: Not on file     Forced sexual activity: Not on file   Other Topics Concern     Parent/sibling w/ CABG, MI or angioplasty before 65F 55M? No   Social History Narrative     Not on file        FAMILY HISTORY:   Family History   Problem Relation Age of Onset     Diabetes Mother         pre diabetes     Arthritis Mother      Hypertension Father      Cancer Maternal Grandmother      Cancer Maternal Grandfather      Cancer Paternal Grandmother      Cancer Paternal Grandfather         mesothelioma        EXAM:Vitals: /80   Pulse 95   Ht 1.651 m (5' 5\")   Wt 99.8 kg (220 lb)   BMI 36.61 kg/m              General appearance: Patient is alert and fully cooperative with history & exam.  No sign of distress is noted during the visit.     Psychiatric: Affect is pleasant & appropriate.  Patient appears motivated to improve health.     Respiratory: Breathing is regular & unlabored while sitting.     HEENT: Hearing is intact to spoken word.  Speech is clear.  No gross evidence of visual impairment that would impact ambulation.     Dermatologic: Skin is intact to left lower extremities without significant lesions, rash or abrasion.        Vascular: DP & PT pulses are intact & regular on the left.   CFT and skin temperature is normal to the left lower extremities.     Neurologic: Lower extremity sensation is intact to light touch.  No evidence of weakness in the left lower extremities.        Musculoskeletal: Patient is ambulatory without assistive device or brace.  No " gross ankle deformity noted.  No foot or ankle joint effusion is noted.  Noted pain on palpation over the medial aspect of the left ankle.  No surrounding erythema or edema noted.  No ecchymosis noted.  Pain on palpation noted over the Achilles tendon on the left.    Radiographs were reviewed including non weightbearing AP, lateral and mortise views of the left ankle reveals evidence of previous ligament repair over the medial lateral malleolus.  No cortical erosions or periosteal elevation.  The ankle mortise appear stable.  There is no apparent fracture or tumor formation noted.  There is no evidence of foreign body.    ASSESSMENT / PLAN:     ICD-10-CM    1. Acute left ankle pain  M25.572 Orthopedic & Spine  Referral       I have explained to Maris  about the conditions.  We discussed the underlying contributing factors of the condition as well as the treatment plan and expected length of recovery.  At this time, the patient will remain in a cam boot and instructed on icing the left ankle.  The patient will return in 2 weeks for reevaluation.  She may benefit from an AFO ankle brace can be worn with her shoes for the long-term.  We will discuss more about this at her next appointment.    Maris verbalized agreement with and understanding of the rational for the diagnosis and treatment plan.  All questions were answered to best of my ability and the patient's satisfaction. The patient was advised to contact the clinic with any questions that may arise after the clinic visit.      Disclaimer: This note consists of symbols derived from keyboarding, dictation and/or voice recognition software. As a result, there may be errors in the script that have gone undetected. Please consider this when interpreting information found in this chart.       ROBIN Lane.P.CHRISTINE., F.A.C.F.A.S.        Again, thank you for allowing me to participate in the care of your patient.        Sincerely,        Shaw Cerda,  CELE

## 2020-12-27 ENCOUNTER — APPOINTMENT (OUTPATIENT)
Dept: CT IMAGING | Facility: CLINIC | Age: 45
End: 2020-12-27
Attending: FAMILY MEDICINE
Payer: MEDICARE

## 2020-12-27 ENCOUNTER — HOSPITAL ENCOUNTER (EMERGENCY)
Facility: CLINIC | Age: 45
Discharge: HOME | End: 2020-12-27
Attending: FAMILY MEDICINE | Admitting: FAMILY MEDICINE
Payer: MEDICARE

## 2020-12-27 VITALS
OXYGEN SATURATION: 95 % | TEMPERATURE: 98.5 F | RESPIRATION RATE: 18 BRPM | BODY MASS INDEX: 33.32 KG/M2 | HEIGHT: 65 IN | DIASTOLIC BLOOD PRESSURE: 78 MMHG | WEIGHT: 200 LBS | SYSTOLIC BLOOD PRESSURE: 131 MMHG | HEART RATE: 72 BPM

## 2020-12-27 DIAGNOSIS — M54.2 CERVICALGIA: ICD-10-CM

## 2020-12-27 DIAGNOSIS — M54.10 RADICULOPATHY, UNSPECIFIED SPINAL REGION: ICD-10-CM

## 2020-12-27 DIAGNOSIS — M54.6 ACUTE MIDLINE THORACIC BACK PAIN: ICD-10-CM
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
## 2020-12-27 PROCEDURE — 72128 CT CHEST SPINE W/O DYE: CPT

## 2020-12-27 PROCEDURE — 250N000013 HC RX MED GY IP 250 OP 250 PS 637: Performed by: FAMILY MEDICINE

## 2020-12-27 PROCEDURE — 250N000011 HC RX IP 250 OP 636: Performed by: FAMILY MEDICINE

## 2020-12-27 PROCEDURE — 72125 CT NECK SPINE W/O DYE: CPT

## 2020-12-27 PROCEDURE — 99284 EMERGENCY DEPT VISIT MOD MDM: CPT | Performed by: FAMILY MEDICINE

## 2020-12-27 PROCEDURE — 99284 EMERGENCY DEPT VISIT MOD MDM: CPT | Mod: 25 | Performed by: FAMILY MEDICINE

## 2020-12-27 RX ORDER — ONDANSETRON 4 MG/1
4 TABLET, ORALLY DISINTEGRATING ORAL ONCE
Status: COMPLETED | OUTPATIENT
Start: 2020-12-27 | End: 2020-12-27

## 2020-12-27 RX ORDER — ONDANSETRON 4 MG/1
4 TABLET, ORALLY DISINTEGRATING ORAL EVERY 6 HOURS PRN
Qty: 20 TABLET | Refills: 0 | Status: SHIPPED | OUTPATIENT
Start: 2020-12-27 | End: 2020-12-30

## 2020-12-27 RX ORDER — ACETAMINOPHEN 500 MG
1000 TABLET ORAL ONCE
Status: COMPLETED | OUTPATIENT
Start: 2020-12-27 | End: 2020-12-27

## 2020-12-27 RX ADMIN — ONDANSETRON 4 MG: 4 TABLET, ORALLY DISINTEGRATING ORAL at 15:39

## 2020-12-27 RX ADMIN — ACETAMINOPHEN 1000 MG: 500 TABLET ORAL at 15:40

## 2020-12-27 ASSESSMENT — MIFFLIN-ST. JEOR: SCORE: 1558.07

## 2020-12-27 NOTE — DISCHARGE INSTRUCTIONS
Tylenol/ibuprofen as needed for pain.  Zofran if needed for nausea/vomiting.  Warm pack alternating with cold packing to the area of concern.  Return to the emergency department if worse or changes.  Given that you have had some symptoms involving the right hand since your fall in August, now with some additional concerns in that area I would recommend that you follow-up with neurology in clinic for further evaluation and potential further imaging consideration.

## 2020-12-27 NOTE — ED AVS SNAPSHOT
[facility] Emergency Dept  [address] [facility] [address]  Phone: [phone]  Fax: [phone]                                    Maris Wagner   MRN: [id number]    Department: [facility] Emergency Dept   Date of Visit: 12/27/2020           After Visit Summary Signature Page    I have received my discharge instructions, and my questions have been answered. I have discussed any challenges I see with this plan with the nurse or doctor.    ..........................................................................................................................................  Patient/Patient Representative Signature      ..........................................................................................................................................  Patient Representative Print Name and Relationship to Patient    ..................................................               ................................................  Date                                   Time    ..........................................................................................................................................  Reviewed by Signature/Title    ...................................................              ..............................................  Date                                               Time          22EPIC Rev 08/18

## 2020-12-27 NOTE — ED PROVIDER NOTES
"  History     Chief Complaint   Patient presents with     Fall     pt reports she fell in her bathroom on wednesday, pt reports she hit her neck on the bathtub. Pt now reports she numbness down right arm. Pt reports she does have a couple bulging disks in neck and is concerned she may have ruptured a disk in her neck      HPI  Maris Wagner is a 45 year old female, past medical history is significant for morbid obesity, rheumatoid arthritis, chronic back pain, anemia, irritable bowel syndrome, obesity, gastroesophageal reflux disease, seasonal allergies, presents the emergency department with concerns of neck pain and right arm numbness beginning Wednesday of this past week 4 days prior to presentation.  History is obtained from the patient who states that she slipped and fell when she lost her balance in her bathroom 4 days prior to presentation causing her to fall backwards striking her neck on the edge of the toilet seat.  She did not traumatize her head had mild neck pain only.  The pain was worse the next day.  She states that she has not taken anything for pain but reports that since the time of the injury she has had initially numbness in her right third digit and subsequently all 5 digits of the right hand and also of the volar/ulnar aspect of the right forearm by description.  She does not notice any weakness.  She states that she has \"herniated disks\" in her neck and she is concerned that she may have ruptured a disc.  I reviewed her most recent CT scan of her cervical spine done 8/18/2020 which does not show any evidence of difficult disc abnormality or vertebral height loss or findings related to acute trauma when she fell off a 20 foot candie at that time.  She also states she feels mildly nauseated, anytime she eats or drinks since the time of the trauma she feels more nauseated but has not yet thrown up.  She notes no pain in her head or thoracic back area.  She reports a chronic neuropathy in her left " side of the body.  This is unchanged.    3:02 PM  I went to see the patient has been roomed on the board and in the department for 27 minutes and she has not yet been in the room.      CT CERVICAL SPINE WITHOUT CONTRAST August 18, 2020 9:16 AM      HISTORY: Fall off of 20 foot candie two weeks ago with cervical spine  pain and tenderness.     TECHNIQUE: Axial images of the cervical spine were obtained without  intravenous contrast. Multiplanar reformations were performed.  Radiation dose for this scan was reduced using automated exposure  control, adjustment of the mA and/or kV according to patient size, or  iterative reconstruction technique.     COMPARISON: None.     FINDINGS: Straightening of the normal cervical lordosis. No loss of  vertebral body height. No acute lucent fracture line visualized. No  suspicious bony lesion. Mild degenerative endplate changes without  significant loss of intervertebral disc height at C5-C6. No  significant disc herniation. No spinal canal or neural foraminal  narrowing at any level.      Visualized paraspinous tissues: Unremarkable.                                                                      IMPRESSION:   1. No evidence of acute fracture or subluxation in the cervical spine.  2. Early degenerative disc changes at C5-C6. No spinal canal or neural  foraminal narrowing at any level.      ANTHONY RODRIGUEZ MD  Allergies:  Allergies   Allergen Reactions     Nka [No Known Allergies]        Problem List:    Patient Active Problem List    Diagnosis Date Noted     Morbid obesity (H) 08/23/2018     Priority: Medium     Rheumatoid arthritis involving multiple sites with positive rheumatoid factor (H) 09/25/2017     Priority: Medium     Chronic back pain 04/12/2017     Priority: Medium     Recurrent herniation of lumbar disc 12/28/2016     Priority: Medium     S/P lumbar discectomy 07/18/2016     Priority: Medium     Anemia 02/02/2015     Priority: Medium     Irritable bowel syndrome  02/02/2015     Priority: Medium     Obesity 02/02/2015     Priority: Medium     Gastroesophageal reflux disease      Priority: Medium     Diagnosis updated by automated process. Provider to review and confirm.       Ileus, postoperative (H) 01/31/2015     Priority: Medium     Spinal stenosis of lumbar region 01/29/2015     Priority: Medium     Herniation of lumbar intervertebral disc without myelopathy 01/29/2015     Priority: Medium     Neuropathy 01/01/2013     Priority: Medium     CARDIOVASCULAR SCREENING; LDL GOAL LESS THAN 130 12/10/2012     Priority: Medium     Seasonal allergies 02/02/2015     Priority: Low        Past Medical History:    No past medical history on file.    Past Surgical History:    Past Surgical History:   Procedure Laterality Date     ARTHROSCOPY ANKLE, OPEN REPAIR LIGAMENT, COMBINED Right 8/3/2017    Procedure: COMBINED ARTHROSCOPY ANKLE, OPEN REPAIR LIGAMENT;  Right Ankle Arthroscopic Evaluation and Debridement,Lateral Ligament Repair,Fracture Evaluation, Open Reduction Internal Fixation ;  Surgeon: Clint Simon DPM;  Location: [facility]     ARTHROSCOPY ANKLE, OPEN REPAIR LIGAMENT, COMBINED Left 1/31/2019    Procedure: Left Ankle Arthroscopy Evaluation & Debridement & Cartilage Repair & Ligament Revision Repair & Medial Ankle Ligament & Tendon Repair.;  Surgeon: Clint Simon DPM;  Location: [facility]     OPEN REDUCTION INTERNAL FIXATION ANKLE Right 8/3/2017    Procedure: OPEN REDUCTION INTERNAL FIXATION ANKLE;;  Surgeon: Clint Simon DPM;  Location: [facility]     REMOVE FOREIGN BODY FINGER Left 3/28/2017    Procedure: REMOVE FOREIGN BODY FINGER;  Surgeon: Tabby Chan MD;  Location: [facility]     TUBAL LIGATION         Family History:    Family History   Problem Relation Age of Onset     Diabetes Mother         pre diabetes     Arthritis Mother      Hypertension Father      Cancer Maternal Grandmother      Cancer Maternal Grandfather      Cancer Paternal Grandmother       "Cancer Paternal Grandfather         mesothelioma       Social History:  Marital Status:  Single [1]  Social History     Tobacco Use     Smoking status: Current Every Day Smoker     Packs/day: 0.50     Years: 25.00     Pack years: 12.50     Types: Cigarettes     Smokeless tobacco: Never Used   Substance Use Topics     Alcohol use: Yes     Comment: rare     Drug use: No        Medications:         albuterol (PROAIR HFA/PROVENTIL HFA/VENTOLIN HFA) 108 (90 BASE) MCG/ACT Inhaler       celecoxib (CELEBREX) 200 MG capsule       order for DME       pregabalin (LYRICA) 100 MG capsule       sodium chloride 0.9 % SOLN 250 mL with ramucirumab 10 MG/ML SOLN infusion          Review of Systems   All other systems reviewed and are negative.      Physical Exam   BP: (!) 150/82  Pulse: 91  Temp: 98.5  F (36.9  C)  Resp: 18  Height: 165.1 cm (5' 5\")  Weight: 90.7 kg (200 lb)      Physical Exam  Vitals signs and nursing note reviewed.   Constitutional:       General: She is not in acute distress.     Appearance: Normal appearance. She is obese. She is not ill-appearing.   HENT:      Head: Normocephalic and atraumatic.      Right Ear: Tympanic membrane normal.      Left Ear: Tympanic membrane normal.      Nose: Nose normal.      Mouth/Throat:      Mouth: Mucous membranes are dry.      Pharynx: Oropharynx is clear.   Eyes:      Extraocular Movements: Extraocular movements intact.      Conjunctiva/sclera: Conjunctivae normal.   Neck:      Musculoskeletal: Muscular tenderness present.     Cardiovascular:      Rate and Rhythm: Normal rate and regular rhythm.      Pulses: Normal pulses.      Heart sounds: Normal heart sounds.   Pulmonary:      Effort: Pulmonary effort is normal.      Breath sounds: Normal breath sounds.   Abdominal:      General: Bowel sounds are normal.      Palpations: Abdomen is soft.   Musculoskeletal:         General: Tenderness present.   Skin:     General: Skin is warm and dry.      Capillary Refill: Capillary refill " takes less than 2 seconds.   Neurological:      General: No focal deficit present.      Mental Status: She is alert and oriented to person, place, and time.      GCS: GCS eye subscore is 4. GCS verbal subscore is 5. GCS motor subscore is 6.      Cranial Nerves: Cranial nerves are intact.      Sensory: Sensation is intact.      Motor: Motor function is intact.      Coordination: Coordination is intact.      Deep Tendon Reflexes: Babinski sign absent on the right side. Babinski sign absent on the left side.      Reflex Scores:       Tricep reflexes are 2+ on the right side and 2+ on the left side.       Bicep reflexes are 2+ on the right side and 2+ on the left side.       Brachioradialis reflexes are 2+ on the right side and 2+ on the left side.       Patellar reflexes are 2+ on the right side and 2+ on the left side.       Achilles reflexes are 2+ on the right side and 2+ on the left side.        ED Course        Procedures               Critical Care time:  none               Results for orders placed or performed during the hospital encounter of 12/27/20 (from the past 24 hour(s))   CT Cervical Spine w/o Contrast    Narrative    CT OF THE CERVICAL SPINE WITHOUT CONTRAST   12/27/2020 4:25 PM     COMPARISON: 8/18/2020    HISTORY: Neck pain after trauma, numbness in right hand and right  volar forearm.     TECHNIQUE: Axial images of the cervical spine were acquired without  intravenous contrast. Multiplanar reformations were created.      FINDINGS: The alignment is normal. No fracture. Vertebral body heights  are normal. There is mild loss of disc height at C5-6. There are small  osteophytes at this level. Disc heights are otherwise maintained. The  paraspinous soft tissues are unremarkable. The facets articulate  normally.    There is a shallow disc bulge and small osteophytes at C5-6. There is  no central stenosis or neural foraminal stenosis in the cervical  spine.      Impression    IMPRESSION:  1. No fracture or  subluxation.  2. Mild degeneration of the C5-6 disc.  3. No central stenosis or neural foraminal stenosis.      Radiation dose for this scan was reduced using automated exposure  control, adjustment of the mA and/or kV according to patient size, or  iterative reconstruction technique    JANES MENSAH MD   CT Thoracic Spine w/o Contrast    Narrative    CT THORACIC SPINE WITHOUT CONTRAST   12/27/2020 4:26 PM     HISTORY: Pain, Trauma. Fell    TECHNIQUE: Axial images of the thoracic spine were obtained without  intravenous contrast. Multiplanar reformations were performed.   Radiation dose for this scan was reduced using automated exposure  control, adjustment of the mA and/or kV according to patient size, or  iterative reconstruction technique.    COMPARISON: Thoracic CT August 18, 2020 from [facility]    FINDINGS: Normal vertebral body heights. Negative for fracture or  traumatic malalignment. Mid and lower thoracic degenerative disc  changes redemonstrated. Vacuum disc at T10-11 and T11-12 again seen.  Incompletely visualized lumbar fusion postop changes noted. Visualized  paraspinous soft tissues are unremarkable.      Impression    IMPRESSION:   Mid thoracic degenerative disc changes. Negative for  fracture.    SUKHJINDER MONTAÑO MD       Medications   ondansetron (ZOFRAN-ODT) ODT tab 4 mg (4 mg Oral Given 12/27/20 1539)   acetaminophen (TYLENOL) tablet 1,000 mg (1,000 mg Oral Given 12/27/20 1540)     5:40 PM  I reviewed the imaging studies in the room with the patient and answered her questions.  She states that she has had some right third digit numbness type paresthesias since her fall in August but has not followed up on it.  Suspect that her symptoms with respect to paresthesias involving her right upper extremity today in light of the negative cervical CT and thoracic CT are most likely related to some transient action or neuropraxia rather than any specific lesion centrally.  MR was not available at  the time of the patient presentation today and that was discussed with her.  Before proceeding with any type of additional imaging at this point I recommended that she follow-up as an outpatient with neurology.  If she experiences worsening or any muscular weakness I have asked her to return to the emergency department.  I recommended that her pain be managed with Tylenol/ibuprofen, warm packs/cold pack to the affected areas.    Assessments & Plan (with Medical Decision Making)   Assessments and plan with medical decision making at the time stamp above.      Disclaimer: This note consists of symbols derived from keyboarding, dictation and/or voice recognition software. As a result, there may be errors in the script that have gone undetected. Please consider this when interpreting information found in this chart.      I have reviewed the nursing notes.    I have reviewed the findings, diagnosis, plan and need for follow up with the patient.            New Prescriptions    No medications on file       Final diagnoses:   Fall, initial encounter   Cervicalgia   Acute midline thoracic back pain   Radiculopathy, unspecified spinal region - Cervical right-sided       12/27/2020   [facility] EMERGENCY DEPT     Mika Flores MD  12/27/20 2343

## 2021-01-19 ENCOUNTER — PRE VISIT (OUTPATIENT)
Dept: NEUROLOGY | Facility: CLINIC | Age: 46
End: 2021-01-19

## 2021-01-19 NOTE — TELEPHONE ENCOUNTER
FUTURE VISIT INFORMATION:    Date: 1/26/21    Time:  0840    Location: [facility]   REFERRAL INFORMATION:    Referring provider:  [facility] ED    Referring providers clinic:     Reason for visit/diagnosis:  Neck Pain/ Paresthesias after Injury       NOTES (FOR ALL VISITS) STATUS DETAILS   OFFICE NOTE from referring provider Printed    OFFICE NOTE from other specialist Printed     DISCHARGE SUMMARY from hospital       DISCHARGE REPORT from the ER Printed 12/27/20   MEDICATION LIST In Epic     IMAGING  (FOR ALL VISITS)       EMG    None   EEG       MRI (HEAD, NECK, SPINE) Printed  Images in PACs CT cervical 12/27/20  CT thoracic 12/27/20   CARDIAC STUDIES        FORMAL COGNITIVE TESTING       OTHER

## 2021-01-26 ENCOUNTER — HOSPITAL ENCOUNTER (OUTPATIENT)
Dept: MRI IMAGING | Facility: CLINIC | Age: 46
Discharge: HOME | End: 2021-01-26
Attending: PSYCHIATRY & NEUROLOGY | Admitting: PSYCHIATRY & NEUROLOGY
Payer: MEDICARE

## 2021-01-26 ENCOUNTER — OFFICE VISIT (OUTPATIENT)
Dept: NEUROLOGY | Facility: CLINIC | Age: 46
End: 2021-01-26
Payer: MEDICARE

## 2021-01-26 VITALS
HEART RATE: 99 BPM | RESPIRATION RATE: 16 BRPM | TEMPERATURE: 98.2 F | SYSTOLIC BLOOD PRESSURE: 163 MMHG | DIASTOLIC BLOOD PRESSURE: 95 MMHG

## 2021-01-26 DIAGNOSIS — M54.12 CERVICAL RADICULOPATHY: Primary | ICD-10-CM

## 2021-01-26 DIAGNOSIS — M54.12 CERVICAL RADICULOPATHY: ICD-10-CM

## 2021-01-26 PROCEDURE — 99204 OFFICE O/P NEW MOD 45 MIN: CPT | Performed by: PSYCHIATRY & NEUROLOGY

## 2021-01-26 PROCEDURE — 72141 MRI NECK SPINE W/O DYE: CPT | Mod: ME

## 2021-01-26 PROCEDURE — G1004 CDSM NDSC: HCPCS

## 2021-01-26 NOTE — PATIENT INSTRUCTIONS
Plan:  -- MRI Cervical spine.  -- Referral to Spine Specialist.   -- Return to Neurology as needed.

## 2021-01-26 NOTE — LETTER
1/26/2021         RE: Maris Wagner  [address]  [facility] [place] [facility] [address]        Dear Colleague,    Thank you for referring your patient, Maris Wagner, to the [facility] [place]. Please see a copy of my visit note below.    INITIAL NEUROLOGY CONSULTATION    DATE OF VISIT: 1/26/2021  MRN: [id number]  PATIENT NAME: Maris Wagner  YOB: 1975    REFERRING PROVIDER: No ref. provider found    Chief Complaint   Patient presents with     New Patient     Neck pain, paresthias-- after injury.  Referral by [facility] ED.       SUBJECTIVE:                                                      HPI:   Maris Wagner is a 45 year old female whom I have been asked by the [facility] ED to see in consultation for neck pain/paresthesias. Per chart review, the patient was in the ED on 12.27.20 for neck pain and numbness in the Right arm x 4 days. She had fallen in the bathroom prior to symptom onset, and noted worsening neck pain since. No weakness reported. Prior cervical CT showed early degenerative changes at C5-C6, no narrowing. An updated cervical CT was obtained and did not show any change. Thoracic imaging was also unremarkable. ED provider notes that MRI was not available that day. She was referred for outpatient Neurology consultation as a next step.     Patient has additional history of obesity, RA, chronic pain, IBS, GERD, anemia and seasonal allergies.     She has trouble with numbness in the Right hand, she points to her middle finger on the right.  She says she was having the numbness and some neck pain prior to the recent injury, is just that symptoms are worse now.  She describes some neck pain and tightness.  Ibuprofen does help.  She has tried muscle relaxants but they are not as helpful.  Describes choking sensation when her muscles are tight.  When the neck muscles calm down, she says she did no longer has that sensation in her throat.  She describes pain radiating all the way from the neck  down into the hands.  She is right-handed, and says that sometimes the numbness causes some clumsiness.      She has had multiple lumbar spine surgeries.  She says she is here to get an order for an MRI and then a referral to a new spine specialist, as hers has since left [facility] (Dr. Kim).    No problems controlling the bladder or bowels.  She does mention that she has had multiple foot surgeries as well.  She does have some decreased sensation and flexibility in the feet as a result.    When I asked her about nerve testing, she says she does not want to have another EMG, she had one in the past and it was too painful.  Of note, the patient is on Lyrica.    She does endorse some headaches.  No visual changes.      No past medical history on file.  Past Surgical History:   Procedure Laterality Date     ARTHROSCOPY ANKLE, OPEN REPAIR LIGAMENT, COMBINED Right 8/3/2017    Procedure: COMBINED ARTHROSCOPY ANKLE, OPEN REPAIR LIGAMENT;  Right Ankle Arthroscopic Evaluation and Debridement,Lateral Ligament Repair,Fracture Evaluation, Open Reduction Internal Fixation ;  Surgeon: Clint Simon DPM;  Location: [facility] OR     ARTHROSCOPY ANKLE, OPEN REPAIR LIGAMENT, COMBINED Left 1/31/2019    Procedure: Left Ankle Arthroscopy Evaluation & Debridement & Cartilage Repair & Ligament Revision Repair & Medial Ankle Ligament & Tendon Repair.;  Surgeon: Clint Simon DPM;  Location: [facility] OR     OPEN REDUCTION INTERNAL FIXATION ANKLE Right 8/3/2017    Procedure: OPEN REDUCTION INTERNAL FIXATION ANKLE;;  Surgeon: Clint Simon DPM;  Location: [facility] OR     REMOVE FOREIGN BODY FINGER Left 3/28/2017    Procedure: REMOVE FOREIGN BODY FINGER;  Surgeon: Tabby Chan MD;  Location: [facility] OR     TUBAL LIGATION              albuterol (PROAIR HFA/PROVENTIL HFA/VENTOLIN HFA) 108 (90 BASE) MCG/ACT Inhaler, Inhale 2 puffs into the lungs every 6 hours       pregabalin (LYRICA) 100 MG capsule, Take 100 mg by mouth 3 times daily        sodium chloride 0.9 % SOLN 250 mL with ramucirumab 10 MG/ML SOLN infusion, Arthritis, once every 2 weeks       celecoxib (CELEBREX) 200 MG capsule, Take 1 capsule (200 mg) by mouth 2 times daily as needed for moderate pain       order for DME, Equipment being ordered:ankle brace (Patient not taking: Reported on 12/14/2020)    No current facility-administered medications on file prior to visit.     Allergies   Allergen Reactions     Nka [No Known Allergies]         Problem (# of Occurrences) Relation (Name,Age of Onset)    Arthritis (1) Mother    Cancer (4) Maternal Grandmother, Maternal Grandfather, Paternal Grandmother, Paternal Grandfather: mesothelioma    Diabetes (1) Mother: pre diabetes    Hypertension (1) Father        Social History     Tobacco Use     Smoking status: Current Every Day Smoker     Packs/day: 0.50     Years: 25.00     Pack years: 12.50     Types: Cigarettes     Smokeless tobacco: Never Used   Substance Use Topics     Alcohol use: Yes     Comment: rare     Drug use: No       REVIEW OF SYSTEMS:                                                      10-point review of systems is negative except as mentioned above in HPI.     EXAM:                                                      Physical Exam:   Vitals: BP (!) 163/95 (BP Location: Left arm, Patient Position: Sitting, Cuff Size: Adult Regular)   Pulse 99   Temp 98.2  F (36.8  C)   Resp 16   BMI= There is no height or weight on file to calculate BMI.  GENERAL: NAD.  HEENT: NC/AT.  Mild tenderness at occiput.  Neck is supple.  CV: RRR. S1, S2.   NECK: No bruits.  PULM: Non-labored breathing.   Neurologic:  MENTAL STATUS: Alert, attentive. Speech is fluent. Normal comprehension. Normal concentration. Adequate fund of knowledge.   CRANIAL NERVES: Discs technically difficult to visualize.  Visual fields intact to confrontation. Pupils equally, round and reactive to light. Facial sensation and movement normal. EOM full. Hearing intact to  conversation. Trapezius strength intact. Palate moves symmetrically. Tongue midline.  MOTOR: 5/5 in proximal and distal muscle groups of upper and lower extremities. Tone and bulk normal.   DTRs: Intact and symmetric in biceps, BR.  Trace at patellae.  Cannot elicit ankle jerks.  Babinski down-going bilaterally.   SENSATION: Normal light touch and pinprick, except for slight decrease in pinprick in the right hand, third digit. Intact proprioception at great toes. Vibration: Diminished on the left compared to the right (history of surgery).  COORDINATION: Normal finger nose finger. Finger tapping normal. Knee heel shin normal.  STATION AND GAIT: Romberg negative. Good postural reflexes. Casual gait is normal.  Tandem unsteady.  Right hand-dominant.    Relevant Data:  Cervical Spine CT (12.27.20):  IMPRESSION:  1. No fracture or subluxation.  2. Mild degeneration of the C5-6 disc.  3. No central stenosis or neural foraminal stenosis.    Thoracic Spine CT (12.27.20):  FINDINGS: Normal vertebral body heights. Negative for fracture or  traumatic malalignment. Mid and lower thoracic degenerative disc  changes redemonstrated. Vacuum disc at T10-11 and T11-12 again seen.  Incompletely visualized lumbar fusion postop changes noted. Visualized  paraspinous soft tissues are unremarkable.                                                                   IMPRESSION:   Mid thoracic degenerative disc changes. Negative for  Fracture.    Imaging reviewed independently by me.  Agree with radiology read.      ASSESSMENT and PLAN:                                                      Assessment:     ICD-10-CM    1. Cervical radiculopathy  M54.12 MR Cervical Spine w/o Contrast     Orthopedic & Spine  Referral        Ms. Wagner is a pleasant 45-year-old woman with history of rheumatic arthritis, and multiple spinal surgeries here for assessment of numbness in the right hand and neck pain.  Her symptoms are consistent with cervical  radiculopathy.  We will go ahead and get better imaging with a cervical spine MRI.  She is also looking for referral to a spine specialist.  I did mention that nerve testing might be helpful, but the patient is not interested in doing this at this time.  We will follow-up as needed.    Maris to follow up with Primary Care provider regarding elevated blood pressure.    Plan:  -- MRI Cervical spine.  -- Referral to Spine Specialist.   -- Return to Neurology as needed.      Total Time: 45 minutes were spent with the patient and in chart review/documentation.  (as described above in Assessment and Plan) /coordinating the care.    Kathrin Mendes MD  Neurology    CC: Mika Montero MD    Dragon software used in the dictation of this note.        Again, thank you for allowing me to participate in the care of your patient.        Sincerely,        Kathrin Mendes MD

## 2021-01-26 NOTE — LETTER
"January 28, 2021      Maris Wagner  [address]  [facility] [address]        Dear Dr. Cinthya Pollock has reviewed your results and has made the following observations:      Your cervical spine MRI does show some degenerative (\"wear and tear\") changes with possible pressure on the base of your nerves.  The findings are stable when compared to the CT that was done previously.  I am not sure if this explains all of your symptoms but I think it makes sense for you to follow-up with the spine specialist as planned.     Please don't hesitate to contact our office with any additional questions or concerns.         Sincerely,    Nereida Constantino [facility]  For Kahtrin Mendes MD  --------------------------------------------------------------------------------------------------------------------------------      Resulted Orders   MR Cervical Spine w/o Contrast    Narrative    MRI OF THE CERVICAL SPINE WITHOUT CONTRAST   1/26/2021 5:05 PM     HISTORY: Neck pain with bilateral upper extremity radicular pain.    TECHNIQUE: Multiplanar, multisequence MRI images of the cervical spine  were acquired without contrast.    COMPARISON: Cervical spine CT 12/27/2020    FINDINGS:   Normal vertebral body heights and marrow signal. Straightened  lordosis. Normal cord signal. Visualized extraspinal soft tissues are  within normal limits.     C2-3: Normal disc height.  No herniation.  Normal facets.  No spinal  canal or neural foraminal stenosis.    C3-4: Normal disc height.  No herniation.  Normal facets.  No spinal  canal or neural foraminal stenosis.    C4-5: Normal disc height.  No herniation.  Normal facets.  No spinal  canal or neural foraminal stenosis.    C5-6: Mild disc height loss. Moderate left central disc herniation.  Normal facets.  Moderate to severe stenosis of the left foraminal  inlet. There is mass effect on the left C6 nerve root. No spinal canal  stenosis. No right neural foraminal stenosis.    C6-7: Normal disc " height. Mild posterior annular bulge. Small left  central disc protrusion. Normal facets. No spinal canal or neural  foraminal stenosis.    C7-T1: Normal disc height.  No herniation.  Normal facets.  No spinal  canal or neural foraminal stenosis.      Impression    IMPRESSION:  1.  Moderate left central C5-6 disc herniation with moderate to severe  stenosis of the left foraminal inlet.  2.  There is mass effect on the left C6 nerve root.  3.  Mild C6-7 disc degeneration without nerve root distortion.  4.  These findings are better seen than on the comparison CT. No  definite change.    SUZAN CERVANTES MD

## 2021-01-26 NOTE — PROGRESS NOTES
INITIAL NEUROLOGY CONSULTATION    DATE OF VISIT: 1/26/2021  MRN: [id number]  PATIENT NAME: Maris Wagner  YOB: 1975    REFERRING PROVIDER: No ref. provider found    Chief Complaint   Patient presents with     New Patient     Neck pain, paresthias-- after injury.  Referral by [facility] ED.       SUBJECTIVE:                                                      HPI:   Maris Wagner is a 45 year old female whom I have been asked by the [facility] ED to see in consultation for neck pain/paresthesias. Per chart review, the patient was in the ED on 12.27.20 for neck pain and numbness in the Right arm x 4 days. She had fallen in the bathroom prior to symptom onset, and noted worsening neck pain since. No weakness reported. Prior cervical CT showed early degenerative changes at C5-C6, no narrowing. An updated cervical CT was obtained and did not show any change. Thoracic imaging was also unremarkable. ED provider notes that MRI was not available that day. She was referred for outpatient Neurology consultation as a next step.     Patient has additional history of obesity, RA, chronic pain, IBS, GERD, anemia and seasonal allergies.     She has trouble with numbness in the Right hand, she points to her middle finger on the right.  She says she was having the numbness and some neck pain prior to the recent injury, is just that symptoms are worse now.  She describes some neck pain and tightness.  Ibuprofen does help.  She has tried muscle relaxants but they are not as helpful.  Describes choking sensation when her muscles are tight.  When the neck muscles calm down, she says she did no longer has that sensation in her throat.  She describes pain radiating all the way from the neck down into the hands.  She is right-handed, and says that sometimes the numbness causes some clumsiness.      She has had multiple lumbar spine surgeries.  She says she is here to get an order for an MRI and then a referral to a new spine  specialist, as hers has since left [facility] (Dr. Kim).    No problems controlling the bladder or bowels.  She does mention that she has had multiple foot surgeries as well.  She does have some decreased sensation and flexibility in the feet as a result.    When I asked her about nerve testing, she says she does not want to have another EMG, she had one in the past and it was too painful.  Of note, the patient is on Lyrica.    She does endorse some headaches.  No visual changes.      No past medical history on file.  Past Surgical History:   Procedure Laterality Date     ARTHROSCOPY ANKLE, OPEN REPAIR LIGAMENT, COMBINED Right 8/3/2017    Procedure: COMBINED ARTHROSCOPY ANKLE, OPEN REPAIR LIGAMENT;  Right Ankle Arthroscopic Evaluation and Debridement,Lateral Ligament Repair,Fracture Evaluation, Open Reduction Internal Fixation ;  Surgeon: Clint Simon DPM;  Location: [facility] OR     ARTHROSCOPY ANKLE, OPEN REPAIR LIGAMENT, COMBINED Left 1/31/2019    Procedure: Left Ankle Arthroscopy Evaluation & Debridement & Cartilage Repair & Ligament Revision Repair & Medial Ankle Ligament & Tendon Repair.;  Surgeon: Clint Simon DPM;  Location: [facility] OR     OPEN REDUCTION INTERNAL FIXATION ANKLE Right 8/3/2017    Procedure: OPEN REDUCTION INTERNAL FIXATION ANKLE;;  Surgeon: Clint Simon DPM;  Location: [facility] OR     REMOVE FOREIGN BODY FINGER Left 3/28/2017    Procedure: REMOVE FOREIGN BODY FINGER;  Surgeon: Tabby Chan MD;  Location: [facility] OR     TUBAL LIGATION              albuterol (PROAIR HFA/PROVENTIL HFA/VENTOLIN HFA) 108 (90 BASE) MCG/ACT Inhaler, Inhale 2 puffs into the lungs every 6 hours       pregabalin (LYRICA) 100 MG capsule, Take 100 mg by mouth 3 times daily       sodium chloride 0.9 % SOLN 250 mL with ramucirumab 10 MG/ML SOLN infusion, Arthritis, once every 2 weeks       celecoxib (CELEBREX) 200 MG capsule, Take 1 capsule (200 mg) by mouth 2 times daily as needed for moderate pain       order  for DME, Equipment being ordered:ankle brace (Patient not taking: Reported on 12/14/2020)    No current facility-administered medications on file prior to visit.     Allergies   Allergen Reactions     Nka [No Known Allergies]         Problem (# of Occurrences) Relation (Name,Age of Onset)    Arthritis (1) Mother    Cancer (4) Maternal Grandmother, Maternal Grandfather, Paternal Grandmother, Paternal Grandfather: mesothelioma    Diabetes (1) Mother: pre diabetes    Hypertension (1) Father        Social History     Tobacco Use     Smoking status: Current Every Day Smoker     Packs/day: 0.50     Years: 25.00     Pack years: 12.50     Types: Cigarettes     Smokeless tobacco: Never Used   Substance Use Topics     Alcohol use: Yes     Comment: rare     Drug use: No       REVIEW OF SYSTEMS:                                                      10-point review of systems is negative except as mentioned above in HPI.     EXAM:                                                      Physical Exam:   Vitals: BP (!) 163/95 (BP Location: Left arm, Patient Position: Sitting, Cuff Size: Adult Regular)   Pulse 99   Temp 98.2  F (36.8  C)   Resp 16   BMI= There is no height or weight on file to calculate BMI.  GENERAL: NAD.  HEENT: NC/AT.  Mild tenderness at occiput.  Neck is supple.  CV: RRR. S1, S2.   NECK: No bruits.  PULM: Non-labored breathing.   Neurologic:  MENTAL STATUS: Alert, attentive. Speech is fluent. Normal comprehension. Normal concentration. Adequate fund of knowledge.   CRANIAL NERVES: Discs technically difficult to visualize.  Visual fields intact to confrontation. Pupils equally, round and reactive to light. Facial sensation and movement normal. EOM full. Hearing intact to conversation. Trapezius strength intact. Palate moves symmetrically. Tongue midline.  MOTOR: 5/5 in proximal and distal muscle groups of upper and lower extremities. Tone and bulk normal.   DTRs: Intact and symmetric in biceps, BR.  Trace at  patellae.  Cannot elicit ankle jerks.  Babinski down-going bilaterally.   SENSATION: Normal light touch and pinprick, except for slight decrease in pinprick in the right hand, third digit. Intact proprioception at great toes. Vibration: Diminished on the left compared to the right (history of surgery).  COORDINATION: Normal finger nose finger. Finger tapping normal. Knee heel shin normal.  STATION AND GAIT: Romberg negative. Good postural reflexes. Casual gait is normal.  Tandem unsteady.  Right hand-dominant.    Relevant Data:  Cervical Spine CT (12.27.20):  IMPRESSION:  1. No fracture or subluxation.  2. Mild degeneration of the C5-6 disc.  3. No central stenosis or neural foraminal stenosis.    Thoracic Spine CT (12.27.20):  FINDINGS: Normal vertebral body heights. Negative for fracture or  traumatic malalignment. Mid and lower thoracic degenerative disc  changes redemonstrated. Vacuum disc at T10-11 and T11-12 again seen.  Incompletely visualized lumbar fusion postop changes noted. Visualized  paraspinous soft tissues are unremarkable.                                                                   IMPRESSION:   Mid thoracic degenerative disc changes. Negative for  Fracture.    Imaging reviewed independently by me.  Agree with radiology read.      ASSESSMENT and PLAN:                                                      Assessment:     ICD-10-CM    1. Cervical radiculopathy  M54.12 MR Cervical Spine w/o Contrast     Orthopedic & Spine  Referral        Ms. Wagner is a pleasant 45-year-old woman with history of rheumatic arthritis, and multiple spinal surgeries here for assessment of numbness in the right hand and neck pain.  Her symptoms are consistent with cervical radiculopathy.  We will go ahead and get better imaging with a cervical spine MRI.  She is also looking for referral to a spine specialist.  I did mention that nerve testing might be helpful, but the patient is not interested in doing this  at this time.  We will follow-up as needed.    Maris to follow up with Primary Care provider regarding elevated blood pressure.    Plan:  -- MRI Cervical spine.  -- Referral to Spine Specialist.   -- Return to Neurology as needed.      Total Time: 45 minutes were spent with the patient and in chart review/documentation.  (as described above in Assessment and Plan) /coordinating the care.    Kathrin Mendes MD  Neurology    CC: Mika Montero MD    Dragon software used in the dictation of this note.

## 2021-01-28 ENCOUNTER — NURSE TRIAGE (OUTPATIENT)
Dept: NURSING | Facility: CLINIC | Age: 46
End: 2021-01-28

## 2021-01-28 NOTE — RESULT ENCOUNTER NOTE
Please let Maris know that her cervical spine MRI does show some degenerative changes with possible nerve root impingement.  The findings are stable when compared to the CT that was done previously.  I am not sure if this explains all of her symptoms but I think it makes sense for her to follow-up with the spine specialist as planned.    Thank you,  Dr. Mendes

## 2021-01-29 NOTE — TELEPHONE ENCOUNTER
"Cervical spine MRI done 1/26/2021, she does not have a phone and they have been trying to reach her. She is calling for results.     Advised to contact her MD for interpretation of results.   Impression information given. Includes #4.\"no definite change\".     Aneta Sterling RN Triage Nurse Advisor 6:44 PM 1/28/2021        "

## 2023-09-25 NOTE — ED TRIAGE NOTES
Post-Op Assessment Note    CV Status:  Stable  Pain Score: 0    Pain management: adequate     Mental Status:  Alert and awake   Hydration Status:  Euvolemic   PONV Controlled:  Controlled   Airway Patency:  Patent      Post Op Vitals Reviewed: Yes      Staff: Anesthesiologist, CRNA         No notable events documented.     BP   123/69   Temp   97.1   Pulse  70   Resp   16   SpO2   100 Pt arrives in tears complaining of rt jaw pain that she states is unbearable. She states that she has been seen twice over the last month for the same problem and was told it was her ear . She states that she has muffled sound from that ear. She states that she fell 20 ft from a rock wall about a month ago. She has had a trauma work up per her report regarding that fall. She has been taking Tylenol and Ibuprofen for pain. Her Blood pressure is elevated.

## (undated) DEVICE — DRSG ADAPTIC 3X8" 6113

## (undated) DEVICE — BLADE KNIFE SURG 15 371115

## (undated) DEVICE — NDL 18GA 1.5" 305196

## (undated) DEVICE — GOWN IMPERVIOUS SPECIALTY XLG/XLONG 32474

## (undated) DEVICE — GLOVE PROTEXIS W/NEU-THERA 8.5  2D73TE85

## (undated) DEVICE — PREP CHLORAPREP 26ML TINTED ORANGE  260815

## (undated) DEVICE — DRAPE C-ARM 60X42" 1013

## (undated) DEVICE — DRAPE STERI TOWEL SM 1000

## (undated) DEVICE — SU ETHILON 3-0 FS-1 18" 669H

## (undated) DEVICE — BNDG ESMARK 4" STERILE 836-3412

## (undated) DEVICE — DRAPE SHEET REV FOLD 3/4 9349

## (undated) DEVICE — GLOVE PROTEXIS W/NEU-THERA 8.0  2D73TE80

## (undated) DEVICE — GLOVE PROTEXIS MICRO 7.0  2D73PM70

## (undated) DEVICE — SOL WATER IRRIG 1000ML BOTTLE 07139-09

## (undated) DEVICE — ESU ARTHROWAND 90DEG RF AMBIENT SUPER TURBOVAC ASHA4250-01

## (undated) DEVICE — DRILL BIT ARTHREX 2.5MM AR-8943-42

## (undated) DEVICE — SOL NACL 0.9% IRRIG 1000ML BOTTLE 07138-09

## (undated) DEVICE — TUBING PUMP LINVATEC 10K150

## (undated) DEVICE — CAST PADDING 4" STERILE 9044S

## (undated) DEVICE — SU VICRYL 2-0 SH 27" UND J417H

## (undated) DEVICE — BLADE SHAVER ARTHRO 4.2MM FULL RADIUS 9247A

## (undated) DEVICE — BNDG COBAN 2"X5YDS UNSTERILE 2082

## (undated) DEVICE — DECANTER BAG 2002S

## (undated) DEVICE — GLOVE PROTEXIS W/NEU-THERA 7.0  2D73TE70

## (undated) DEVICE — DISTRACTOR STRAP ANKLE ARTHRO AR-1712

## (undated) DEVICE — CAST PADDING 4" WEBRIL UNSTERILE

## (undated) DEVICE — SPLINT FIBERGLASS 4X30" PRE-CUT RESIN 76430

## (undated) DEVICE — KIT ANCHOR ARTHREX SM JOINT BIOCOMP SUTURETAK AR-8934DSC

## (undated) DEVICE — BNDG ELASTIC 6"X5YDS UNSTERILE 6611-60

## (undated) DEVICE — PREP CHLORHEXIDINE 4% 4OZ (HIBICLENS) 57504

## (undated) DEVICE — ESU HOLSTER PLASTIC DISP E2400

## (undated) DEVICE — GLOVE PROTEXIS POWDER FREE 7.5 ORTHOPEDIC 2D73ET75

## (undated) DEVICE — CAST PADDING 6" STERILE 9046S

## (undated) DEVICE — DRSG GAUZE 4X4" TRAY

## (undated) DEVICE — GLOVE PROTEXIS BLUE W/NEU-THERA 7.0  2D73EB70

## (undated) DEVICE — DRILL BIT ARTHREX LPS CAN 3.5MM AR-4160-35

## (undated) DEVICE — GLOVE PROTEXIS W/NEU-THERA 6.5  2D73TE65

## (undated) DEVICE — PACK ARTHROSCOPY KNEE LATEX FREE SOP32CAFCN

## (undated) DEVICE — ESU CORD BIPOLAR 12' E0509

## (undated) DEVICE — SOL NACL 0.9% IRRIG 3000ML BAG 07972-08

## (undated) DEVICE — DRAPE EXTREMITY W/ARMBOARD 29405

## (undated) DEVICE — PACK EXTREMITY LATEX FREE SOP32HFFCS

## (undated) DEVICE — CAST PADDING 6" COTTON WEBRIL UNSTERILE 9086

## (undated) DEVICE — GOWN LG DISP 9515

## (undated) DEVICE — ADH FLOSEAL W/HUMAN THROMBIN 5ML

## (undated) DEVICE — SYR 10ML FINGER CONTROL W/O NDL 309695

## (undated) DEVICE — BNDG ELASTIC 6" DBL LENGTH UNSTERILE 6611-16

## (undated) DEVICE — SU CHROMIC 4-0 RB-1 27" U203H

## (undated) DEVICE — GLOVE PROTEXIS BLUE W/NEU-THERA 6.5  2D73EB65

## (undated) DEVICE — BLADE KNIFE BEAVER 376700

## (undated) DEVICE — GLOVE PROTEXIS POWDER FREE 8.0 ORTHOPEDIC 2D73ET80

## (undated) DEVICE — DECANTER VIAL 2006S

## (undated) DEVICE — DRILL BIT ARTHREX BB TAK MTP THREADED AR-13226T

## (undated) DEVICE — DRSG ADAPTIC 3X3" 6112

## (undated) DEVICE — GLOVE PROTEXIS BLUE W/NEU-THERA 7.5  2D73EB75

## (undated) DEVICE — GEL ULTRASOUND AQUASONIC 20GM 01-01

## (undated) DEVICE — NDL 22GA 1.5"

## (undated) DEVICE — PREP SKIN SCRUB TRAY 4461A

## (undated) DEVICE — SU FIBERWIRE 2-0 TAPER CUT AR-7220

## (undated) DEVICE — BNDG KLING 2" 2231

## (undated) DEVICE — SU CHROMIC 6-0 G-1 18" 790G

## (undated) DEVICE — DRSG ABDOMINAL 07 1/2X8" 7197D

## (undated) DEVICE — ESU PENCIL W/COATED BLADE E2450H

## (undated) DEVICE — SOL NACL 0.9% 100ML BAG 2B1302

## (undated) DEVICE — DRSG JUMPSTART ANTIMICROBIAL 4X4" ABS-4004

## (undated) DEVICE — PACK HAND

## (undated) DEVICE — DRSG GAUZE 4X4" 3033

## (undated) RX ORDER — HYDRALAZINE HYDROCHLORIDE 20 MG/ML
INJECTION INTRAMUSCULAR; INTRAVENOUS
Status: DISPENSED
Start: 2017-08-03

## (undated) RX ORDER — PROPOFOL 10 MG/ML
INJECTION, EMULSION INTRAVENOUS
Status: DISPENSED
Start: 2017-08-03

## (undated) RX ORDER — OXYCODONE AND ACETAMINOPHEN 5; 325 MG/1; MG/1
TABLET ORAL
Status: DISPENSED
Start: 2019-01-31

## (undated) RX ORDER — CEFAZOLIN SODIUM 2 G/100ML
INJECTION, SOLUTION INTRAVENOUS
Status: DISPENSED
Start: 2019-01-31

## (undated) RX ORDER — ACETAMINOPHEN 325 MG/1
TABLET ORAL
Status: DISPENSED
Start: 2017-08-03

## (undated) RX ORDER — DEXAMETHASONE SODIUM PHOSPHATE 4 MG/ML
INJECTION, SOLUTION INTRA-ARTICULAR; INTRALESIONAL; INTRAMUSCULAR; INTRAVENOUS; SOFT TISSUE
Status: DISPENSED
Start: 2017-08-03

## (undated) RX ORDER — OXYCODONE AND ACETAMINOPHEN 5; 325 MG/1; MG/1
TABLET ORAL
Status: DISPENSED
Start: 2017-08-03

## (undated) RX ORDER — ACETAMINOPHEN 325 MG/1
TABLET ORAL
Status: DISPENSED
Start: 2019-01-31

## (undated) RX ORDER — BUPIVACAINE HYDROCHLORIDE 5 MG/ML
INJECTION, SOLUTION PERINEURAL
Status: DISPENSED
Start: 2019-01-31

## (undated) RX ORDER — GINSENG 100 MG
CAPSULE ORAL
Status: DISPENSED
Start: 2017-03-28

## (undated) RX ORDER — FENTANYL CITRATE 50 UG/ML
INJECTION, SOLUTION INTRAMUSCULAR; INTRAVENOUS
Status: DISPENSED
Start: 2019-01-31

## (undated) RX ORDER — FENTANYL CITRATE 50 UG/ML
INJECTION, SOLUTION INTRAMUSCULAR; INTRAVENOUS
Status: DISPENSED
Start: 2017-08-03

## (undated) RX ORDER — LABETALOL HYDROCHLORIDE 5 MG/ML
INJECTION, SOLUTION INTRAVENOUS
Status: DISPENSED
Start: 2017-08-03

## (undated) RX ORDER — PROPOFOL 10 MG/ML
INJECTION, EMULSION INTRAVENOUS
Status: DISPENSED
Start: 2019-01-31

## (undated) RX ORDER — LIDOCAINE HYDROCHLORIDE 10 MG/ML
INJECTION, SOLUTION INFILTRATION; PERINEURAL
Status: DISPENSED
Start: 2019-01-31

## (undated) RX ORDER — LIDOCAINE HCL/EPINEPHRINE/PF 2%-1:200K
VIAL (ML) INJECTION
Status: DISPENSED
Start: 2017-08-03

## (undated) RX ORDER — DEXAMETHASONE SODIUM PHOSPHATE 4 MG/ML
INJECTION, SOLUTION INTRA-ARTICULAR; INTRALESIONAL; INTRAMUSCULAR; INTRAVENOUS; SOFT TISSUE
Status: DISPENSED
Start: 2019-01-31

## (undated) RX ORDER — PROPOFOL 10 MG/ML
INJECTION, EMULSION INTRAVENOUS
Status: DISPENSED
Start: 2017-03-28

## (undated) RX ORDER — BUPIVACAINE HYDROCHLORIDE 5 MG/ML
INJECTION, SOLUTION PERINEURAL
Status: DISPENSED
Start: 2017-08-03

## (undated) RX ORDER — GLYCOPYRROLATE 0.2 MG/ML
INJECTION, SOLUTION INTRAMUSCULAR; INTRAVENOUS
Status: DISPENSED
Start: 2019-01-31

## (undated) RX ORDER — FENTANYL CITRATE 50 UG/ML
INJECTION, SOLUTION INTRAMUSCULAR; INTRAVENOUS
Status: DISPENSED
Start: 2017-03-28

## (undated) RX ORDER — BUPIVACAINE HYDROCHLORIDE 5 MG/ML
INJECTION, SOLUTION PERINEURAL
Status: DISPENSED
Start: 2017-03-28

## (undated) RX ORDER — LIDOCAINE HYDROCHLORIDE 10 MG/ML
INJECTION, SOLUTION EPIDURAL; INFILTRATION; INTRACAUDAL; PERINEURAL
Status: DISPENSED
Start: 2019-01-31

## (undated) RX ORDER — ROPIVACAINE HYDROCHLORIDE 7.5 MG/ML
INJECTION, SOLUTION EPIDURAL; PERINEURAL
Status: DISPENSED
Start: 2017-08-03

## (undated) RX ORDER — ROPIVACAINE HYDROCHLORIDE 5 MG/ML
INJECTION, SOLUTION EPIDURAL; INFILTRATION; PERINEURAL
Status: DISPENSED
Start: 2019-01-31

## (undated) RX ORDER — LIDOCAINE HYDROCHLORIDE 10 MG/ML
INJECTION, SOLUTION INFILTRATION; PERINEURAL
Status: DISPENSED
Start: 2017-03-28

## (undated) RX ORDER — LIDOCAINE HYDROCHLORIDE 10 MG/ML
INJECTION, SOLUTION EPIDURAL; INFILTRATION; INTRACAUDAL; PERINEURAL
Status: DISPENSED
Start: 2017-08-03

## (undated) RX ORDER — GABAPENTIN 300 MG/1
CAPSULE ORAL
Status: DISPENSED
Start: 2019-01-31

## (undated) RX ORDER — CELECOXIB 200 MG/1
CAPSULE ORAL
Status: DISPENSED
Start: 2019-01-31

## (undated) RX ORDER — ONDANSETRON 2 MG/ML
INJECTION INTRAMUSCULAR; INTRAVENOUS
Status: DISPENSED
Start: 2017-08-03

## (undated) RX ORDER — ONDANSETRON 2 MG/ML
INJECTION INTRAMUSCULAR; INTRAVENOUS
Status: DISPENSED
Start: 2019-01-31